# Patient Record
Sex: FEMALE | Race: WHITE | NOT HISPANIC OR LATINO | Employment: OTHER | URBAN - NONMETROPOLITAN AREA
[De-identification: names, ages, dates, MRNs, and addresses within clinical notes are randomized per-mention and may not be internally consistent; named-entity substitution may affect disease eponyms.]

---

## 2017-03-22 RX ORDER — DULOXETIN HYDROCHLORIDE 60 MG/1
60 CAPSULE, DELAYED RELEASE ORAL DAILY
Qty: 30 CAPSULE | Refills: 11 | Status: SHIPPED | OUTPATIENT
Start: 2017-03-22 | End: 2018-03-05

## 2017-03-23 ENCOUNTER — TELEPHONE (OUTPATIENT)
Dept: FAMILY MEDICINE CLINIC | Facility: CLINIC | Age: 37
End: 2017-03-23

## 2017-03-23 NOTE — TELEPHONE ENCOUNTER
Patient called and advised the script was sent yesterday 3/22/17,  She states she did not check with the pharm.  I told her they don't notify you of refills like they have in the past.          ----- Message from Apple Lawrence sent at 3/23/2017  3:09 PM CDT -----  Regarding: refill   Aileen Gena pt  Contact: [phone]  Pt left message on machine today for Aileen that she needed a refill of Duloxetine sent to [facility]. She is almost out she said that she left message yesterday but didn't hear anything back. Phone is [phone]. 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

## 2017-04-02 ENCOUNTER — APPOINTMENT (OUTPATIENT)
Dept: GENERAL RADIOLOGY | Facility: HOSPITAL | Age: 37
End: 2017-04-02

## 2017-04-02 ENCOUNTER — HOSPITAL ENCOUNTER (EMERGENCY)
Facility: HOSPITAL | Age: 37
Discharge: HOME | End: 2017-04-03
Attending: FAMILY MEDICINE | Admitting: FAMILY MEDICINE

## 2017-04-02 DIAGNOSIS — S82.852A: Primary | ICD-10-CM

## 2017-04-02 PROCEDURE — 99284 EMERGENCY DEPT VISIT MOD MDM: CPT

## 2017-04-02 PROCEDURE — 96374 THER/PROPH/DIAG INJ IV PUSH: CPT

## 2017-04-02 PROCEDURE — 25010000002 HYDROMORPHONE PER 4 MG: Performed by: FAMILY MEDICINE

## 2017-04-02 PROCEDURE — 73610 X-RAY EXAM OF ANKLE: CPT

## 2017-04-02 PROCEDURE — 96376 TX/PRO/DX INJ SAME DRUG ADON: CPT

## 2017-04-02 RX ORDER — ONDANSETRON 4 MG/1
4 TABLET, FILM COATED ORAL EVERY 6 HOURS PRN
Qty: 10 TABLET | Refills: 0 | Status: SHIPPED | OUTPATIENT
Start: 2017-04-02 | End: 2018-09-24

## 2017-04-02 RX ORDER — CHOLECALCIFEROL (VITAMIN D3) 125 MCG
10 CAPSULE ORAL NIGHTLY
COMMUNITY
End: 2019-08-24 | Stop reason: HOSPADM

## 2017-04-02 RX ORDER — HYDROCODONE BITARTRATE AND ACETAMINOPHEN 5; 325 MG/1; MG/1
1 TABLET ORAL ONCE
Status: COMPLETED | OUTPATIENT
Start: 2017-04-02 | End: 2017-04-02

## 2017-04-02 RX ORDER — HYDROCODONE BITARTRATE AND ACETAMINOPHEN 5; 325 MG/1; MG/1
4 TABLET ORAL ONCE
Status: DISCONTINUED | OUTPATIENT
Start: 2017-04-02 | End: 2017-04-03 | Stop reason: HOSPADM

## 2017-04-02 RX ORDER — HYDROCODONE BITARTRATE AND ACETAMINOPHEN 10; 325 MG/1; MG/1
1 TABLET ORAL EVERY 6 HOURS PRN
Qty: 20 TABLET | Refills: 0 | Status: SHIPPED | OUTPATIENT
Start: 2017-04-02 | End: 2017-04-05 | Stop reason: SDUPTHER

## 2017-04-02 RX ADMIN — HYDROMORPHONE HYDROCHLORIDE 1 MG: 1 INJECTION, SOLUTION INTRAMUSCULAR; INTRAVENOUS; SUBCUTANEOUS at 23:30

## 2017-04-02 RX ADMIN — HYDROCODONE BITARTRATE AND ACETAMINOPHEN 1 TABLET: 5; 325 TABLET ORAL at 23:02

## 2017-04-02 RX ADMIN — HYDROMORPHONE HYDROCHLORIDE 1 MG: 1 INJECTION, SOLUTION INTRAMUSCULAR; INTRAVENOUS; SUBCUTANEOUS at 22:34

## 2017-04-03 VITALS
BODY MASS INDEX: 32.44 KG/M2 | RESPIRATION RATE: 18 BRPM | HEART RATE: 114 BPM | TEMPERATURE: 98.5 F | WEIGHT: 190 LBS | SYSTOLIC BLOOD PRESSURE: 115 MMHG | OXYGEN SATURATION: 95 % | DIASTOLIC BLOOD PRESSURE: 63 MMHG | HEIGHT: 64 IN

## 2017-04-03 PROCEDURE — 25010000002 HYDROMORPHONE PER 4 MG: Performed by: FAMILY MEDICINE

## 2017-04-03 PROCEDURE — 96376 TX/PRO/DX INJ SAME DRUG ADON: CPT

## 2017-04-03 RX ORDER — HYDROCODONE BITARTRATE AND ACETAMINOPHEN 5; 325 MG/1; MG/1
1 TABLET ORAL EVERY 6 HOURS PRN
Status: DISCONTINUED | OUTPATIENT
Start: 2017-04-03 | End: 2017-04-03 | Stop reason: HOSPADM

## 2017-04-03 RX ADMIN — HYDROMORPHONE HYDROCHLORIDE 1 MG: 1 INJECTION, SOLUTION INTRAMUSCULAR; INTRAVENOUS; SUBCUTANEOUS at 00:18

## 2017-04-03 NOTE — ED NOTES
PT BROUGHT INTO ED FOR EVALUATION OF INJURIES SUSTAINED FROM A FALL. PT FELL IN A BLUFF AND HAS AN OBVIOUS HX TO THE L ANKLE. THERE WAS AN APPROX 2 HOUR RESCUE D/T THE FX. CMS INTACT.  PT GIVEN PAIN MED EN ROUTE VIA EMS.       Rocío Charlton RN  04/02/17 2052

## 2017-04-03 NOTE — ED PROVIDER NOTES
Subjective   Patient is a 36 y.o. female presenting with lower extremity pain.   Lower Extremity Issue   Location:  Ankle  Injury: yes    Ankle location:  L ankle  Pain details:     Quality:  Aching    Severity:  Severe    Onset quality:  Sudden    Timing:  Constant  Foreign body present:  No foreign bodies  Worsened by:  Flexion and extension  Associated symptoms: decreased ROM, swelling and tingling    Associated symptoms: no back pain, no fatigue, no fever, no itching, no muscle weakness, no neck pain, no numbness and no stiffness    Risk factors: no concern for non-accidental trauma        Review of Systems   Constitutional: Negative for appetite change, chills, diaphoresis, fatigue and fever.   HENT: Negative for congestion, ear discharge, ear pain, nosebleeds, rhinorrhea, sinus pressure, sore throat and trouble swallowing.    Eyes: Negative for discharge and redness.   Respiratory: Negative for apnea, cough, chest tightness, shortness of breath and wheezing.    Cardiovascular: Negative for chest pain.   Gastrointestinal: Positive for nausea. Negative for abdominal pain, diarrhea and vomiting.   Endocrine: Negative for polyuria.   Genitourinary: Negative for dysuria, frequency and urgency.   Musculoskeletal: Positive for gait problem and joint swelling. Negative for back pain, myalgias, neck pain and stiffness.   Skin: Negative for color change, itching and rash.   Allergic/Immunologic: Negative for immunocompromised state.   Neurological: Negative for dizziness, seizures, syncope, weakness, light-headedness and headaches.   Hematological: Negative for adenopathy. Does not bruise/bleed easily.   Psychiatric/Behavioral: Negative for behavioral problems and confusion.   All other systems reviewed and are negative.      Past Medical History:   Diagnosis Date   • Abdominal pain    • Acute bronchitis    • Acute upper respiratory infection    • Anxiety state    • Backache    • Bell's palsy    • Cellulitis      left  "breast      • Chest wall pain     right lateral chest wall-poss early shingles      • Chest wall pain 10/21/2015    right lateral chest wall-poss early shingles      • Chronic low back pain     May 13 2014 MRI ddd with mild left neuroforminal narrowing l4-l5      • Chronic retention of urine     ACUTE   • Cough    • Depressive disorder    • Dysuria    • Facial hemiparesis    • Facial hemiparesis    • Fatigue    • Fatigue    • Fever    • Ganglion cyst     LEFT BREAST   • Generalized anxiety disorder    • Headache    • Insomnia    • Pain and other symptoms associated with female genital organs    • Pain radiating to back     LUMBAR   • Postnasal drip    • Retention of urine    • Skin sensation disturbance    • Urinary incontinence    • Urinary tract infectious disease    • Viral gastroenteritis    • Vulvovaginitis        Allergies   Allergen Reactions   • Compazine [Prochlorperazine Edisylate] Anxiety     \"coming out of my skin\"       Past Surgical History:   Procedure Laterality Date   • ANKLE OPEN REDUCTION INTERNAL FIXATION Left 2017    Procedure: ANKLE OPEN REDUCTION INTERNAL FIXATION;  Surgeon: Leo Cisneros MD;  Location: [facility];  Service:    • APPENDECTOMY     •  SECTION     • CHOLECYSTECTOMY     • INJECTION OF MEDICATION  2016    KENALOG(4)   • INJECTION OF MEDICATION  2011    PHENERGAN(1)   • INJECTION OF MEDICATION  2015    ROCEPHIN(1)   • INJECTION OF MEDICATION  2015    TORADOL(2)       Family History   Problem Relation Age of Onset   • Alzheimer's disease Other    • ADD / ADHD Other    • Depression Other    • Diabetes Other    • Migraines Other        Social History     Social History   • Marital status:      Spouse name: N/A   • Number of children: N/A   • Years of education: N/A     Social History Main Topics   • Smoking status: Current Every Day Smoker     Packs/day: 1.00   • Smokeless tobacco: Never Used   • Alcohol use No   • Drug use: No   • " Sexual activity: Defer     Other Topics Concern   • None     Social History Narrative           Objective   Physical Exam   Constitutional: She is oriented to person, place, and time. She appears well-developed and well-nourished.   HENT:   Head: Normocephalic and atraumatic.   Nose: Nose normal.   Mouth/Throat: Oropharynx is clear and moist.   Eyes: Conjunctivae and EOM are normal. Pupils are equal, round, and reactive to light. Right eye exhibits no discharge. Left eye exhibits no discharge. No scleral icterus.   Neck: Normal range of motion. Neck supple. No tracheal deviation present.   Cardiovascular: Normal rate, regular rhythm and normal heart sounds.    No murmur heard.  Pulmonary/Chest: Effort normal and breath sounds normal. No stridor. No respiratory distress. She has no wheezes. She has no rales.   Abdominal: Soft. Bowel sounds are normal. She exhibits no distension and no mass. There is no tenderness. There is no rebound and no guarding.   Musculoskeletal: She exhibits tenderness. She exhibits no edema.        Left ankle: She exhibits decreased range of motion, swelling and deformity. She exhibits no laceration and normal pulse. Tenderness. Lateral malleolus and medial malleolus tenderness found.   Neurological: She is alert and oriented to person, place, and time. Coordination normal.   Skin: Skin is warm and dry. No rash noted. No erythema.   Psychiatric: She has a normal mood and affect. Her behavior is normal. Thought content normal.   Nursing note and vitals reviewed.      Splint Application  Date/Time: 4/11/2017 3:06 PM  Performed by: HAILEY ORTIZ  Authorized by: HAILEY OTRIZ   Consent: Verbal consent obtained.  Location details: left leg  Splint type: sugar tong  Supplies used: Ortho-Glass,  elastic bandage and cotton padding  Post-procedure: The splinted body part was neurovascularly unchanged following the procedure.  Patient tolerance: Patient tolerated the procedure well with  no immediate complications               ED Course  ED Course        Findings discussed with Dr. Vaughn, who has reviewed x-rays, and recommends follow up in clinic this Wednesday.          MDM    Final diagnoses:   Fracture, trimalleolar, left, closed, initial encounter            Дмитрий Dickens MD  04/03/17 0000       Дмитрий Dickens MD  04/11/17 1508       Дмитрий Dickens MD  05/08/17 2596

## 2017-04-05 ENCOUNTER — ANESTHESIA EVENT (OUTPATIENT)
Dept: PERIOP | Facility: HOSPITAL | Age: 37
End: 2017-04-05

## 2017-04-05 ENCOUNTER — OFFICE VISIT (OUTPATIENT)
Dept: ORTHOPEDIC SURGERY | Facility: CLINIC | Age: 37
End: 2017-04-05

## 2017-04-05 VITALS — HEIGHT: 64 IN | BODY MASS INDEX: 32.44 KG/M2 | WEIGHT: 190 LBS

## 2017-04-05 DIAGNOSIS — S82.852S CLOSED TRIMALLEOLAR FRACTURE OF LEFT ANKLE, SEQUELA: Primary | ICD-10-CM

## 2017-04-05 PROBLEM — S82.853A TRIMALLEOLAR FRACTURE OF ANKLE, CLOSED: Status: ACTIVE | Noted: 2017-04-05

## 2017-04-05 PROBLEM — S82.852A CLOSED TRIMALLEOLAR FRACTURE OF LEFT ANKLE: Status: ACTIVE | Noted: 2017-04-05

## 2017-04-05 PROCEDURE — 99204 OFFICE O/P NEW MOD 45 MIN: CPT | Performed by: ORTHOPAEDIC SURGERY

## 2017-04-05 NOTE — PROGRESS NOTES
Chris Valdez is a 36 y.o. female   Primary provider:  ADEOLA Bradley       Chief Complaint   Patient presents with   • Left Ankle - Pain     X-rays done [facility]   HISTORY OF PRESENT ILLNESS:    Pain   This is a new problem. The current episode started in the past 7 days. The problem occurs constantly. The problem has been unchanged. Associated symptoms include joint swelling and neck pain. Pertinent negatives include no abdominal pain, chest pain, chills, congestion, coughing, diaphoresis, fatigue, fever, headaches, nausea, numbness, rash or weakness. Associated symptoms comments: Left ankle pain . Exacerbated by: any movement. She has tried ice, oral narcotics, rest, lying down and acetaminophen for the symptoms. The treatment provided no relief.     Fell when she was hiking at [employer] in [place].  She was evaluated in the ED.       CONCURRENT MEDICAL HISTORY:    Past Medical History:   Diagnosis Date   • Abdominal pain    • Acute bronchitis    • Acute upper respiratory infection    • Anxiety state    • Backache    • Bell's palsy    • Cellulitis      left breast      • Chest wall pain     right lateral chest wall-poss early shingles      • Chest wall pain 10/21/2015    right lateral chest wall-poss early shingles      • Chronic low back pain     May 13 2014 MRI ddd with mild left neuroforminal narrowing l4-l5      • Chronic retention of urine     ACUTE   • Cough    • Depressive disorder    • Dysuria    • Facial hemiparesis    • Facial hemiparesis    • Fatigue    • Fatigue    • Fever    • Ganglion cyst     LEFT BREAST   • Generalized anxiety disorder    • Headache    • Insomnia    • Pain and other symptoms associated with female genital organs    • Pain radiating to back     LUMBAR   • Postnasal drip    • Retention of urine    • Skin sensation disturbance    • Urinary incontinence    • Urinary tract infectious disease    • Viral gastroenteritis    • Vulvovaginitis        Allergies   Allergen  Reactions   • Compazine [Prochlorperazine Edisylate]          Current Outpatient Prescriptions:   •  baclofen (LIORESAL) 10 MG tablet, Take 10 mg by mouth 4 (Four) Times a Day., Disp: , Rfl:   •  baclofen (LIORESAL) 10 MG tablet, Take 1 tablet by mouth 3 (Three) Times a Day., Disp: 90 tablet, Rfl: 3  •  clonazePAM (KlonoPIN) 1 MG tablet, Take 1 tablet by mouth 3 (three) times a day as needed for seizures., Disp: 60 tablet, Rfl: 0  •  DULoxetine (CYMBALTA) 60 MG capsule, Take 1 capsule by mouth Daily., Disp: 30 capsule, Rfl: 11  •  HYDROcodone-acetaminophen (NORCO)  MG per tablet, Take 1 tablet by mouth every 6 (six) hours as needed for moderate pain (4-6)., Disp: , Rfl:   •  HYDROcodone-acetaminophen (NORCO)  MG per tablet, Take 1 tablet by mouth Every 6 (Six) Hours As Needed for Moderate Pain (4-6)., Disp: 20 tablet, Rfl: 0  •  levonorgestrel (MIRENA) 20 MCG/24HR IUD, 1 each by Intrauterine route 1 (one) time., Disp: , Rfl:   •  melatonin 5 MG tablet tablet, Take 10 mg by mouth Every Night., Disp: , Rfl:   •  ondansetron (ZOFRAN) 4 MG tablet, Take 1 tablet by mouth Every 6 (Six) Hours As Needed for Nausea or Vomiting., Disp: 10 tablet, Rfl: 0  •  SUMAtriptan (IMITREX) 100 MG tablet, Take 100 mg by mouth every 2 (two) hours as needed for migraine., Disp: , Rfl:   •  temazepam (RESTORIL) 30 MG capsule, Take 1 capsule by mouth every night at bedtime., Disp: 30 capsule, Rfl: 0  •  traMADol (ULTRAM) 50 MG tablet, Take 50 mg by mouth 2 (two) times a day., Disp: , Rfl:   •  traMADol (ULTRAM) 50 MG tablet, Take 1 tablet by mouth 2 (Two) Times a Day **DO NOT FILL UNTIL 3.30.17**, Disp: 60 tablet, Rfl: 0    Past Surgical History:   Procedure Laterality Date   • INJECTION OF MEDICATION  02/21/2016    KENALOG(4)   • INJECTION OF MEDICATION  01/17/2011    PHENERGAN(1)   • INJECTION OF MEDICATION  09/14/2015    ROCEPHIN(1)   • INJECTION OF MEDICATION  06/05/2015    TORADOL(2)       Family History   Problem Relation  Age of Onset   • Alzheimer's disease Other    • ADD / ADHD Other    • Depression Other    • Diabetes Other    • Migraines Other        Social History     Social History   • Marital status:      Spouse name: N/A   • Number of children: N/A   • Years of education: N/A     Occupational History   • Not on file.     Social History Main Topics   • Smoking status: Current Every Day Smoker   • Smokeless tobacco: Not on file   • Alcohol use Not on file   • Drug use: Not on file   • Sexual activity: Not on file     Other Topics Concern   • Not on file     Social History Narrative        Review of Systems   Constitutional: Negative for appetite change, chills, diaphoresis, fatigue, fever and unexpected weight change.   HENT: Negative.  Negative for congestion, dental problem, facial swelling, hearing loss, nosebleeds, postnasal drip, trouble swallowing and voice change.    Eyes: Negative.  Negative for pain, discharge, redness and itching.   Respiratory: Negative.  Negative for cough, choking, chest tightness, shortness of breath, wheezing and stridor.    Cardiovascular: Negative.  Negative for chest pain, palpitations and leg swelling.   Gastrointestinal: Negative.  Negative for abdominal pain, blood in stool, constipation, diarrhea and nausea.   Endocrine: Negative.  Negative for cold intolerance and heat intolerance.   Genitourinary: Negative.  Negative for dysuria, frequency, hematuria and urgency.   Musculoskeletal: Positive for back pain, joint swelling and neck pain. Negative for gait problem and neck stiffness.   Skin: Negative.  Negative for pallor and rash.   Allergic/Immunologic: Negative.    Neurological: Negative for syncope, facial asymmetry, speech difficulty, weakness, numbness and headaches.   Hematological: Negative.    Psychiatric/Behavioral: Positive for sleep disturbance. Negative for agitation, behavioral problems, confusion, decreased concentration, dysphoric mood and hallucinations. The patient  "is nervous/anxious. The patient is not hyperactive.        PHYSICAL EXAMINATION:       Ht 64\" (162.6 cm)  Wt 190 lb (86.2 kg)  BMI 32.61 kg/m2    Physical Exam   Constitutional: She is oriented to person, place, and time. She appears well-developed and well-nourished. No distress.   HENT:   Head: Normocephalic.   Right Ear: External ear normal.   Left Ear: External ear normal.   Mouth/Throat: No oropharyngeal exudate.   Eyes: EOM are normal. Pupils are equal, round, and reactive to light. Right eye exhibits no discharge. Left eye exhibits no discharge. No scleral icterus.   Neck: Normal range of motion. No JVD present. No tracheal deviation present. No thyromegaly present.   Cardiovascular: Normal rate, regular rhythm, normal heart sounds and intact distal pulses.  Exam reveals no gallop and no friction rub.    No murmur heard.  Pulmonary/Chest: Effort normal and breath sounds normal. No respiratory distress. She has no wheezes. She has no rales. She exhibits no tenderness.   Abdominal: Soft. Bowel sounds are normal. She exhibits no distension and no mass. There is no tenderness. There is no guarding.   Musculoskeletal: Normal range of motion. She exhibits no edema or deformity.        Thoracic back: She exhibits normal range of motion, no tenderness, no bony tenderness, no swelling, no edema, no deformity, no laceration, no pain, no spasm and normal pulse.        Lumbar back: Normal. She exhibits normal range of motion, no tenderness, no bony tenderness, no swelling, no edema, no deformity, no laceration, no pain, no spasm and normal pulse.   Lymphadenopathy:     She has no cervical adenopathy.   Neurological: She is alert and oriented to person, place, and time. She has normal reflexes. She displays normal reflexes. No cranial nerve deficit. She exhibits normal muscle tone. Coordination normal.   Skin: Skin is warm and dry. No rash noted. She is not diaphoretic. No erythema.   Psychiatric: She has a normal mood " and affect. Her behavior is normal. Thought content normal.       GAIT:     []  Normal  []  Antalgic    Assistive device: []  None  []  Walker     []  Crutches  []  Cane     [x]  Wheelchair  []  Stretcher    Right Ankle Exam   Swelling: moderate    Tenderness   The patient is experiencing tenderness in the proximal fibula and medial malleolus.        Range of Motion   Dorsiflexion: 0   Plantar flexion: 0     Muscle Strength   Dorsiflexion:  2/5  Plantar flexion:  2/5  Anterior tibial:  2/5  Posterior tibial:  2/5  Gastrocsoleus:  2/5  Other   Erythema: absent  Sensation: normal     Comments:  Effusion  Splint in place.          Brisk capillary refill.        Xr Ankle 3+ View Left    Result Date: 4/2/2017  Narrative: [facility] [place] Patient Name: BEVERLY PHELAN ATTENDING: REFERRING: EMERGENCY, TRIAGE ORDERING: EMERGENCY, TRIAGE ----------------------- PROCEDURE: Left ankle DATE OF EXAM: 4/5/2017 HISTORY: Fall with injury and deformity. Three views of the left ankle were obtained. Fracture dislocation of the ankle is seen. Transverse fracture through the medial malleolus is seen with oblique fractures of the distal shaft of the fibula. There is also possible fracture of the posterior malleolus. There is a lateral displacement of the talus at the ankle mortise along with angulation.     Impression: Trimalleolar fracture dislocation of ankle. Electronically signed by:  Quoc Cristina MD  4/2/2017 9:37 PM CDT Workstation: [id number]          ASSESSMENT:    Diagnoses and all orders for this visit:    Closed trimalleolar fracture of left ankle, sequela          PLAN    Recommend ORIF for surgical stabilization.  Risks discussed and will proceed.  She understands the expected course for post operative treatment.  Prescription for percocet today.      Leo Cisneros MD

## 2017-04-05 NOTE — H&P
Subjective:     Patient ID: Chris Valdez is a 36 y.o. female.    Chief Complaint:    History of Present Illness   Left ankle pain          Social History     Occupational History   • Not on file.     Social History Main Topics   • Smoking status: Current Every Day Smoker   • Smokeless tobacco: Not on file   • Alcohol use Not on file   • Drug use: Not on file   • Sexual activity: Not on file      Review of Systems        Objective:     Ortho Exam    See exam    Assessment:       1. Closed trimalleolar fracture of left ankle, sequela          Plan:        ORIF of ankle fracture.

## 2017-04-06 ENCOUNTER — APPOINTMENT (OUTPATIENT)
Dept: GENERAL RADIOLOGY | Facility: HOSPITAL | Age: 37
End: 2017-04-06

## 2017-04-06 ENCOUNTER — ANESTHESIA (OUTPATIENT)
Dept: PERIOP | Facility: HOSPITAL | Age: 37
End: 2017-04-06

## 2017-04-06 ENCOUNTER — HOSPITAL ENCOUNTER (OUTPATIENT)
Facility: HOSPITAL | Age: 37
Setting detail: HOSPITAL OUTPATIENT SURGERY
Discharge: HOME | End: 2017-04-06
Attending: ORTHOPAEDIC SURGERY | Admitting: ORTHOPAEDIC SURGERY

## 2017-04-06 VITALS
HEIGHT: 64 IN | SYSTOLIC BLOOD PRESSURE: 123 MMHG | RESPIRATION RATE: 18 BRPM | BODY MASS INDEX: 33.99 KG/M2 | TEMPERATURE: 98.9 F | OXYGEN SATURATION: 96 % | WEIGHT: 199.08 LBS | DIASTOLIC BLOOD PRESSURE: 81 MMHG | HEART RATE: 113 BPM

## 2017-04-06 DIAGNOSIS — S82.852S CLOSED TRIMALLEOLAR FRACTURE OF LEFT ANKLE, SEQUELA: ICD-10-CM

## 2017-04-06 LAB
ANION GAP SERPL CALCULATED.3IONS-SCNC: 12 MMOL/L (ref 5–15)
B-HCG UR QL: NEGATIVE
BUN BLD-MCNC: 12 MG/DL (ref 7–21)
BUN/CREAT SERPL: 20.3 (ref 7–25)
CALCIUM SPEC-SCNC: 9.1 MG/DL (ref 8.4–10.2)
CHLORIDE SERPL-SCNC: 100 MMOL/L (ref 95–110)
CO2 SERPL-SCNC: 22 MMOL/L (ref 22–31)
CREAT BLD-MCNC: 0.59 MG/DL (ref 0.5–1)
DEPRECATED RDW RBC AUTO: 42.1 FL (ref 36.4–46.3)
ERYTHROCYTE [DISTWIDTH] IN BLOOD BY AUTOMATED COUNT: 12.5 % (ref 11.5–14.5)
GFR SERPL CREATININE-BSD FRML MDRD: 117 ML/MIN/1.73 (ref 64–149)
GLUCOSE BLD-MCNC: 88 MG/DL (ref 60–100)
HCT VFR BLD AUTO: 41.5 % (ref 35–45)
HGB BLD-MCNC: 14.1 G/DL (ref 12–15.5)
MCH RBC QN AUTO: 31.1 PG (ref 26.5–34)
MCHC RBC AUTO-ENTMCNC: 34 G/DL (ref 31.4–36)
MCV RBC AUTO: 91.6 FL (ref 80–98)
MRSA DNA SPEC QL NAA+PROBE: NEGATIVE
PLATELET # BLD AUTO: 254 10*3/MM3 (ref 150–450)
PMV BLD AUTO: 10.7 FL (ref 8–12)
POTASSIUM BLD-SCNC: 4.2 MMOL/L (ref 3.5–5.1)
RBC # BLD AUTO: 4.53 10*6/MM3 (ref 3.77–5.16)
SODIUM BLD-SCNC: 134 MMOL/L (ref 137–145)
WBC NRBC COR # BLD: 9.33 10*3/MM3 (ref 3.2–9.8)

## 2017-04-06 PROCEDURE — 25010000002 HYDROMORPHONE PER 4 MG: Performed by: NURSE ANESTHETIST, CERTIFIED REGISTERED

## 2017-04-06 PROCEDURE — C1713 ANCHOR/SCREW BN/BN,TIS/BN: HCPCS | Performed by: ORTHOPAEDIC SURGERY

## 2017-04-06 PROCEDURE — 25010000002 MIDAZOLAM PER 1 MG: Performed by: NURSE ANESTHETIST, CERTIFIED REGISTERED

## 2017-04-06 PROCEDURE — 87641 MR-STAPH DNA AMP PROBE: CPT | Performed by: ORTHOPAEDIC SURGERY

## 2017-04-06 PROCEDURE — 25010000002 ONDANSETRON PER 1 MG: Performed by: NURSE ANESTHETIST, CERTIFIED REGISTERED

## 2017-04-06 PROCEDURE — 27823 TREATMENT OF ANKLE FRACTURE: CPT | Performed by: ORTHOPAEDIC SURGERY

## 2017-04-06 PROCEDURE — 81025 URINE PREGNANCY TEST: CPT | Performed by: ORTHOPAEDIC SURGERY

## 2017-04-06 PROCEDURE — 25010000002 FENTANYL CITRATE (PF) 100 MCG/2ML SOLUTION: Performed by: NURSE ANESTHETIST, CERTIFIED REGISTERED

## 2017-04-06 PROCEDURE — 80048 BASIC METABOLIC PNL TOTAL CA: CPT | Performed by: ORTHOPAEDIC SURGERY

## 2017-04-06 PROCEDURE — 25010000002 MEPERIDINE 25 MG/0.5ML SOLUTION: Performed by: NURSE ANESTHETIST, CERTIFIED REGISTERED

## 2017-04-06 PROCEDURE — 25010000003 CEFAZOLIN PER 500 MG: Performed by: ORTHOPAEDIC SURGERY

## 2017-04-06 PROCEDURE — 25010000002 PROPOFOL 10 MG/ML EMULSION: Performed by: NURSE ANESTHETIST, CERTIFIED REGISTERED

## 2017-04-06 PROCEDURE — 85027 COMPLETE CBC AUTOMATED: CPT | Performed by: ORTHOPAEDIC SURGERY

## 2017-04-06 PROCEDURE — 73610 X-RAY EXAM OF ANKLE: CPT | Performed by: ORTHOPAEDIC SURGERY

## 2017-04-06 PROCEDURE — 76000 FLUOROSCOPY <1 HR PHYS/QHP: CPT

## 2017-04-06 DEVICE — SCRW CORT S/TAP 3.5X22MM: Type: IMPLANTABLE DEVICE | Site: ANKLE | Status: FUNCTIONAL

## 2017-04-06 DEVICE — SCRW CANN SHRT THRD 1/3 4X40MM: Type: IMPLANTABLE DEVICE | Site: ANKLE | Status: FUNCTIONAL

## 2017-04-06 DEVICE — SCRW CORT S/TAP 3.5X14MM: Type: IMPLANTABLE DEVICE | Site: ANKLE | Status: FUNCTIONAL

## 2017-04-06 DEVICE — SCRW CANC FUL/THRD 4.0X14MM: Type: IMPLANTABLE DEVICE | Site: ANKLE | Status: FUNCTIONAL

## 2017-04-06 DEVICE — IMPLANTABLE DEVICE: Type: IMPLANTABLE DEVICE | Site: ANKLE | Status: FUNCTIONAL

## 2017-04-06 DEVICE — PLT TBG 1/3 LCP W COL 7HL 81MM: Type: IMPLANTABLE DEVICE | Site: ANKLE | Status: FUNCTIONAL

## 2017-04-06 DEVICE — SCRW CANC FUL/THRD 4.0X16MM: Type: IMPLANTABLE DEVICE | Site: ANKLE | Status: FUNCTIONAL

## 2017-04-06 DEVICE — IMPLANTABLE DEVICE
Type: IMPLANTABLE DEVICE | Site: ANKLE | Status: FUNCTIONAL
Brand: MICROAIRE®

## 2017-04-06 DEVICE — SCRW CORT S/TAP 3.5X12MM: Type: IMPLANTABLE DEVICE | Site: ANKLE | Status: FUNCTIONAL

## 2017-04-06 DEVICE — WASHR SCRW SM 7.0MM: Type: IMPLANTABLE DEVICE | Site: ANKLE | Status: FUNCTIONAL

## 2017-04-06 RX ORDER — HYDROMORPHONE HCL 110MG/55ML
PATIENT CONTROLLED ANALGESIA SYRINGE INTRAVENOUS AS NEEDED
Status: DISCONTINUED | OUTPATIENT
Start: 2017-04-06 | End: 2017-04-06 | Stop reason: SURG

## 2017-04-06 RX ORDER — OXYCODONE AND ACETAMINOPHEN 10; 325 MG/1; MG/1
1 TABLET ORAL EVERY 4 HOURS PRN
Status: DISCONTINUED | OUTPATIENT
Start: 2017-04-06 | End: 2017-04-06 | Stop reason: HOSPADM

## 2017-04-06 RX ORDER — ONDANSETRON 2 MG/ML
4 INJECTION INTRAMUSCULAR; INTRAVENOUS ONCE AS NEEDED
Status: COMPLETED | OUTPATIENT
Start: 2017-04-06 | End: 2017-04-06

## 2017-04-06 RX ORDER — LIDOCAINE HYDROCHLORIDE 20 MG/ML
INJECTION, SOLUTION INFILTRATION; PERINEURAL AS NEEDED
Status: DISCONTINUED | OUTPATIENT
Start: 2017-04-06 | End: 2017-04-06 | Stop reason: SURG

## 2017-04-06 RX ORDER — ACETAMINOPHEN 325 MG/1
650 TABLET ORAL ONCE AS NEEDED
Status: DISCONTINUED | OUTPATIENT
Start: 2017-04-06 | End: 2017-04-06 | Stop reason: HOSPADM

## 2017-04-06 RX ORDER — BACTERIOSTATIC SODIUM CHLORIDE 0.9 %
VIAL (ML) INJECTION AS NEEDED
Status: DISCONTINUED | OUTPATIENT
Start: 2017-04-06 | End: 2017-04-06 | Stop reason: HOSPADM

## 2017-04-06 RX ORDER — BACITRACIN 50000 [IU]/1
INJECTION, POWDER, FOR SOLUTION INTRAMUSCULAR AS NEEDED
Status: DISCONTINUED | OUTPATIENT
Start: 2017-04-06 | End: 2017-04-06 | Stop reason: HOSPADM

## 2017-04-06 RX ORDER — PROPOFOL 10 MG/ML
VIAL (ML) INTRAVENOUS AS NEEDED
Status: DISCONTINUED | OUTPATIENT
Start: 2017-04-06 | End: 2017-04-06 | Stop reason: SURG

## 2017-04-06 RX ORDER — NALOXONE HCL 0.4 MG/ML
0.2 VIAL (ML) INJECTION AS NEEDED
Status: DISCONTINUED | OUTPATIENT
Start: 2017-04-06 | End: 2017-04-06 | Stop reason: HOSPADM

## 2017-04-06 RX ORDER — SODIUM CHLORIDE 0.9 % (FLUSH) 0.9 %
1-10 SYRINGE (ML) INJECTION AS NEEDED
Status: DISCONTINUED | OUTPATIENT
Start: 2017-04-06 | End: 2017-04-06 | Stop reason: HOSPADM

## 2017-04-06 RX ORDER — PROMETHAZINE HYDROCHLORIDE 25 MG/ML
12.5 INJECTION, SOLUTION INTRAMUSCULAR; INTRAVENOUS EVERY 6 HOURS PRN
Status: DISCONTINUED | OUTPATIENT
Start: 2017-04-06 | End: 2017-04-06 | Stop reason: HOSPADM

## 2017-04-06 RX ORDER — SODIUM CHLORIDE, SODIUM LACTATE, POTASSIUM CHLORIDE, CALCIUM CHLORIDE 600; 310; 30; 20 MG/100ML; MG/100ML; MG/100ML; MG/100ML
9 INJECTION, SOLUTION INTRAVENOUS CONTINUOUS
Status: DISCONTINUED | OUTPATIENT
Start: 2017-04-06 | End: 2017-04-06 | Stop reason: HOSPADM

## 2017-04-06 RX ORDER — PROMETHAZINE HYDROCHLORIDE 12.5 MG/1
12.5 TABLET ORAL EVERY 6 HOURS PRN
Qty: 20 TABLET | Refills: 0 | Status: SHIPPED | OUTPATIENT
Start: 2017-04-06 | End: 2018-09-24

## 2017-04-06 RX ORDER — FLUMAZENIL 0.1 MG/ML
0.2 INJECTION INTRAVENOUS AS NEEDED
Status: DISCONTINUED | OUTPATIENT
Start: 2017-04-06 | End: 2017-04-06 | Stop reason: HOSPADM

## 2017-04-06 RX ORDER — DIPHENHYDRAMINE HYDROCHLORIDE 50 MG/ML
12.5 INJECTION INTRAMUSCULAR; INTRAVENOUS
Status: DISCONTINUED | OUTPATIENT
Start: 2017-04-06 | End: 2017-04-06 | Stop reason: HOSPADM

## 2017-04-06 RX ORDER — ACETAMINOPHEN 650 MG/1
650 SUPPOSITORY RECTAL ONCE AS NEEDED
Status: DISCONTINUED | OUTPATIENT
Start: 2017-04-06 | End: 2017-04-06 | Stop reason: HOSPADM

## 2017-04-06 RX ORDER — FENTANYL CITRATE 50 UG/ML
INJECTION, SOLUTION INTRAMUSCULAR; INTRAVENOUS AS NEEDED
Status: DISCONTINUED | OUTPATIENT
Start: 2017-04-06 | End: 2017-04-06 | Stop reason: SURG

## 2017-04-06 RX ORDER — ONDANSETRON 2 MG/ML
INJECTION INTRAMUSCULAR; INTRAVENOUS AS NEEDED
Status: DISCONTINUED | OUTPATIENT
Start: 2017-04-06 | End: 2017-04-06 | Stop reason: SURG

## 2017-04-06 RX ORDER — OXYCODONE AND ACETAMINOPHEN 7.5; 325 MG/1; MG/1
1 TABLET ORAL EVERY 6 HOURS PRN
Status: DISCONTINUED | OUTPATIENT
Start: 2017-04-06 | End: 2017-04-06 | Stop reason: HOSPADM

## 2017-04-06 RX ORDER — MORPHINE SULFATE 4 MG/ML
4 INJECTION, SOLUTION INTRAMUSCULAR; INTRAVENOUS EVERY 4 HOURS PRN
Status: DISCONTINUED | OUTPATIENT
Start: 2017-04-06 | End: 2017-04-06 | Stop reason: HOSPADM

## 2017-04-06 RX ORDER — LABETALOL HYDROCHLORIDE 5 MG/ML
5 INJECTION, SOLUTION INTRAVENOUS
Status: DISCONTINUED | OUTPATIENT
Start: 2017-04-06 | End: 2017-04-06 | Stop reason: HOSPADM

## 2017-04-06 RX ORDER — SODIUM CHLORIDE, SODIUM GLUCONATE, SODIUM ACETATE, POTASSIUM CHLORIDE, AND MAGNESIUM CHLORIDE 526; 502; 368; 37; 30 MG/100ML; MG/100ML; MG/100ML; MG/100ML; MG/100ML
1000 INJECTION, SOLUTION INTRAVENOUS CONTINUOUS PRN
Status: DISCONTINUED | OUTPATIENT
Start: 2017-04-06 | End: 2017-04-06 | Stop reason: HOSPADM

## 2017-04-06 RX ORDER — MIDAZOLAM HYDROCHLORIDE 1 MG/ML
INJECTION INTRAMUSCULAR; INTRAVENOUS AS NEEDED
Status: DISCONTINUED | OUTPATIENT
Start: 2017-04-06 | End: 2017-04-06 | Stop reason: SURG

## 2017-04-06 RX ADMIN — LIDOCAINE HYDROCHLORIDE 50 MG: 20 INJECTION, SOLUTION INFILTRATION; PERINEURAL at 15:45

## 2017-04-06 RX ADMIN — HYDROMORPHONE HYDROCHLORIDE 0.5 MG: 1 INJECTION, SOLUTION INTRAMUSCULAR; INTRAVENOUS; SUBCUTANEOUS at 18:24

## 2017-04-06 RX ADMIN — MEPERIDINE HYDROCHLORIDE 12.5 MG: 50 INJECTION, SOLUTION INTRAMUSCULAR; INTRAVENOUS; SUBCUTANEOUS at 18:38

## 2017-04-06 RX ADMIN — FENTANYL CITRATE 25 MCG: 50 INJECTION, SOLUTION INTRAMUSCULAR; INTRAVENOUS at 15:52

## 2017-04-06 RX ADMIN — FENTANYL CITRATE 50 MCG: 50 INJECTION, SOLUTION INTRAMUSCULAR; INTRAVENOUS at 16:35

## 2017-04-06 RX ADMIN — HYDROMORPHONE HYDROCHLORIDE 0.5 MG: 1 INJECTION, SOLUTION INTRAMUSCULAR; INTRAVENOUS; SUBCUTANEOUS at 18:29

## 2017-04-06 RX ADMIN — OXYCODONE HYDROCHLORIDE AND ACETAMINOPHEN 1 TABLET: 10; 325 TABLET ORAL at 19:39

## 2017-04-06 RX ADMIN — MEPERIDINE HYDROCHLORIDE 12.5 MG: 50 INJECTION, SOLUTION INTRAMUSCULAR; INTRAVENOUS; SUBCUTANEOUS at 18:17

## 2017-04-06 RX ADMIN — PROPOFOL 200 MG: 10 INJECTION, EMULSION INTRAVENOUS at 15:45

## 2017-04-06 RX ADMIN — HYDROMORPHONE HYDROCHLORIDE 0.5 MG: 2 INJECTION, SOLUTION INTRAMUSCULAR; INTRAVENOUS; SUBCUTANEOUS at 17:10

## 2017-04-06 RX ADMIN — FENTANYL CITRATE 25 MCG: 50 INJECTION, SOLUTION INTRAMUSCULAR; INTRAVENOUS at 15:40

## 2017-04-06 RX ADMIN — SODIUM CHLORIDE, SODIUM GLUCONATE, SODIUM ACETATE, POTASSIUM CHLORIDE, AND MAGNESIUM CHLORIDE 1000 ML: 526; 502; 368; 37; 30 INJECTION, SOLUTION INTRAVENOUS at 14:50

## 2017-04-06 RX ADMIN — ONDANSETRON 4 MG: 2 INJECTION INTRAMUSCULAR; INTRAVENOUS at 17:15

## 2017-04-06 RX ADMIN — HYDROMORPHONE HYDROCHLORIDE 0.5 MG: 2 INJECTION, SOLUTION INTRAMUSCULAR; INTRAVENOUS; SUBCUTANEOUS at 17:20

## 2017-04-06 RX ADMIN — HYDROMORPHONE HYDROCHLORIDE 0.5 MG: 1 INJECTION, SOLUTION INTRAMUSCULAR; INTRAVENOUS; SUBCUTANEOUS at 19:14

## 2017-04-06 RX ADMIN — FENTANYL CITRATE 50 MCG: 50 INJECTION, SOLUTION INTRAMUSCULAR; INTRAVENOUS at 16:05

## 2017-04-06 RX ADMIN — FENTANYL CITRATE 50 MCG: 50 INJECTION, SOLUTION INTRAMUSCULAR; INTRAVENOUS at 16:15

## 2017-04-06 RX ADMIN — ONDANSETRON 4 MG: 2 INJECTION INTRAMUSCULAR; INTRAVENOUS at 18:15

## 2017-04-06 RX ADMIN — MEPERIDINE HYDROCHLORIDE 12.5 MG: 50 INJECTION, SOLUTION INTRAMUSCULAR; INTRAVENOUS; SUBCUTANEOUS at 18:22

## 2017-04-06 RX ADMIN — SODIUM CHLORIDE, SODIUM GLUCONATE, SODIUM ACETATE, POTASSIUM CHLORIDE, AND MAGNESIUM CHLORIDE: 526; 502; 368; 37; 30 INJECTION, SOLUTION INTRAVENOUS at 17:36

## 2017-04-06 RX ADMIN — FENTANYL CITRATE 50 MCG: 50 INJECTION, SOLUTION INTRAMUSCULAR; INTRAVENOUS at 15:55

## 2017-04-06 RX ADMIN — CEFAZOLIN SODIUM 2 G: 1 INJECTION, POWDER, FOR SOLUTION INTRAMUSCULAR; INTRAVENOUS at 15:44

## 2017-04-06 RX ADMIN — FENTANYL CITRATE 25 MCG: 50 INJECTION, SOLUTION INTRAMUSCULAR; INTRAVENOUS at 15:45

## 2017-04-06 RX ADMIN — FENTANYL CITRATE 25 MCG: 50 INJECTION, SOLUTION INTRAMUSCULAR; INTRAVENOUS at 16:00

## 2017-04-06 RX ADMIN — MEPERIDINE HYDROCHLORIDE 12.5 MG: 50 INJECTION, SOLUTION INTRAMUSCULAR; INTRAVENOUS; SUBCUTANEOUS at 18:55

## 2017-04-06 RX ADMIN — MIDAZOLAM 2 MG: 1 INJECTION INTRAMUSCULAR; INTRAVENOUS at 15:37

## 2017-04-06 NOTE — BRIEF OP NOTE
ANKLE OPEN REDUCTION INTERNAL FIXATION  Procedure Note    Chris Valdez  4/6/2017    Pre-op Diagnosis:   Closed trimalleolar fracture of left ankle, sequela [S82.852S]    Post-op Diagnosis:     Post-Op Diagnosis Codes:     * Closed trimalleolar fracture of left ankle, sequela [S82.852S]    Procedure/CPT® Codes:  IA OPEN TX TRIMALLEOLAR ANKLE FX W FIX PST LIP [04134]  IA APPLY LOWER LEG SPLINT [65414]  CHG X-RAY ANKLE 3+ VW [75135]    Procedure(s):  ANKLE OPEN REDUCTION INTERNAL FIXATION    Surgeon(s):  Leo Cisneros MD    Anesthesia: Choice    Staff:   Circulator: Judy Escamilla RN; Francisco Smart RN; Linwood Coleman RN  Scrub Person: Doris Cristina V  Assistant: Blaire Aguirre CSA    Estimated Blood Loss: * 40  Urine Voided: * No values recorded between 4/6/2017  3:40 PM and 4/6/2017  5:58 PM *    Specimens:                * None      Drains:           Findings: unstable ankle fracture    Complications:  none      Leo Cisneros MD     Date: 4/6/2017  Time: 6:18 PM

## 2017-04-06 NOTE — H&P (VIEW-ONLY)
Chris Valdez is a 36 y.o. female   Primary provider:  ADEOLA Bradley       Chief Complaint   Patient presents with   • Left Ankle - Pain     X-rays done [facility]   HISTORY OF PRESENT ILLNESS:    Pain   This is a new problem. The current episode started in the past 7 days. The problem occurs constantly. The problem has been unchanged. Associated symptoms include joint swelling and neck pain. Pertinent negatives include no abdominal pain, chest pain, chills, congestion, coughing, diaphoresis, fatigue, fever, headaches, nausea, numbness, rash or weakness. Associated symptoms comments: Left ankle pain . Exacerbated by: any movement. She has tried ice, oral narcotics, rest, lying down and acetaminophen for the symptoms. The treatment provided no relief.     Fell when she was hiking at [place] in [place].  She was evaluated in the ED.       CONCURRENT MEDICAL HISTORY:    Past Medical History:   Diagnosis Date   • Abdominal pain    • Acute bronchitis    • Acute upper respiratory infection    • Anxiety state    • Backache    • Bell's palsy    • Cellulitis      left breast      • Chest wall pain     right lateral chest wall-poss early shingles      • Chest wall pain 10/21/2015    right lateral chest wall-poss early shingles      • Chronic low back pain     May 13 2014 MRI ddd with mild left neuroforminal narrowing l4-l5      • Chronic retention of urine     ACUTE   • Cough    • Depressive disorder    • Dysuria    • Facial hemiparesis    • Facial hemiparesis    • Fatigue    • Fatigue    • Fever    • Ganglion cyst     LEFT BREAST   • Generalized anxiety disorder    • Headache    • Insomnia    • Pain and other symptoms associated with female genital organs    • Pain radiating to back     LUMBAR   • Postnasal drip    • Retention of urine    • Skin sensation disturbance    • Urinary incontinence    • Urinary tract infectious disease    • Viral gastroenteritis    • Vulvovaginitis        Allergies   Allergen  Reactions   • Compazine [Prochlorperazine Edisylate]          Current Outpatient Prescriptions:   •  baclofen (LIORESAL) 10 MG tablet, Take 10 mg by mouth 4 (Four) Times a Day., Disp: , Rfl:   •  baclofen (LIORESAL) 10 MG tablet, Take 1 tablet by mouth 3 (Three) Times a Day., Disp: 90 tablet, Rfl: 3  •  clonazePAM (KlonoPIN) 1 MG tablet, Take 1 tablet by mouth 3 (three) times a day as needed for seizures., Disp: 60 tablet, Rfl: 0  •  DULoxetine (CYMBALTA) 60 MG capsule, Take 1 capsule by mouth Daily., Disp: 30 capsule, Rfl: 11  •  HYDROcodone-acetaminophen (NORCO)  MG per tablet, Take 1 tablet by mouth every 6 (six) hours as needed for moderate pain (4-6)., Disp: , Rfl:   •  HYDROcodone-acetaminophen (NORCO)  MG per tablet, Take 1 tablet by mouth Every 6 (Six) Hours As Needed for Moderate Pain (4-6)., Disp: 20 tablet, Rfl: 0  •  levonorgestrel (MIRENA) 20 MCG/24HR IUD, 1 each by Intrauterine route 1 (one) time., Disp: , Rfl:   •  melatonin 5 MG tablet tablet, Take 10 mg by mouth Every Night., Disp: , Rfl:   •  ondansetron (ZOFRAN) 4 MG tablet, Take 1 tablet by mouth Every 6 (Six) Hours As Needed for Nausea or Vomiting., Disp: 10 tablet, Rfl: 0  •  SUMAtriptan (IMITREX) 100 MG tablet, Take 100 mg by mouth every 2 (two) hours as needed for migraine., Disp: , Rfl:   •  temazepam (RESTORIL) 30 MG capsule, Take 1 capsule by mouth every night at bedtime., Disp: 30 capsule, Rfl: 0  •  traMADol (ULTRAM) 50 MG tablet, Take 50 mg by mouth 2 (two) times a day., Disp: , Rfl:   •  traMADol (ULTRAM) 50 MG tablet, Take 1 tablet by mouth 2 (Two) Times a Day **DO NOT FILL UNTIL 3.30.17**, Disp: 60 tablet, Rfl: 0    Past Surgical History:   Procedure Laterality Date   • INJECTION OF MEDICATION  02/21/2016    KENALOG(4)   • INJECTION OF MEDICATION  01/17/2011    PHENERGAN(1)   • INJECTION OF MEDICATION  09/14/2015    ROCEPHIN(1)   • INJECTION OF MEDICATION  06/05/2015    TORADOL(2)       Family History   Problem Relation  Age of Onset   • Alzheimer's disease Other    • ADD / ADHD Other    • Depression Other    • Diabetes Other    • Migraines Other        Social History     Social History   • Marital status:      Spouse name: N/A   • Number of children: N/A   • Years of education: N/A     Occupational History   • Not on file.     Social History Main Topics   • Smoking status: Current Every Day Smoker   • Smokeless tobacco: Not on file   • Alcohol use Not on file   • Drug use: Not on file   • Sexual activity: Not on file     Other Topics Concern   • Not on file     Social History Narrative        Review of Systems   Constitutional: Negative for appetite change, chills, diaphoresis, fatigue, fever and unexpected weight change.   HENT: Negative.  Negative for congestion, dental problem, facial swelling, hearing loss, nosebleeds, postnasal drip, trouble swallowing and voice change.    Eyes: Negative.  Negative for pain, discharge, redness and itching.   Respiratory: Negative.  Negative for cough, choking, chest tightness, shortness of breath, wheezing and stridor.    Cardiovascular: Negative.  Negative for chest pain, palpitations and leg swelling.   Gastrointestinal: Negative.  Negative for abdominal pain, blood in stool, constipation, diarrhea and nausea.   Endocrine: Negative.  Negative for cold intolerance and heat intolerance.   Genitourinary: Negative.  Negative for dysuria, frequency, hematuria and urgency.   Musculoskeletal: Positive for back pain, joint swelling and neck pain. Negative for gait problem and neck stiffness.   Skin: Negative.  Negative for pallor and rash.   Allergic/Immunologic: Negative.    Neurological: Negative for syncope, facial asymmetry, speech difficulty, weakness, numbness and headaches.   Hematological: Negative.    Psychiatric/Behavioral: Positive for sleep disturbance. Negative for agitation, behavioral problems, confusion, decreased concentration, dysphoric mood and hallucinations. The patient  "is nervous/anxious. The patient is not hyperactive.        PHYSICAL EXAMINATION:       Ht 64\" (162.6 cm)  Wt 190 lb (86.2 kg)  BMI 32.61 kg/m2    Physical Exam   Constitutional: She is oriented to person, place, and time. She appears well-developed and well-nourished. No distress.   HENT:   Head: Normocephalic.   Right Ear: External ear normal.   Left Ear: External ear normal.   Mouth/Throat: No oropharyngeal exudate.   Eyes: EOM are normal. Pupils are equal, round, and reactive to light. Right eye exhibits no discharge. Left eye exhibits no discharge. No scleral icterus.   Neck: Normal range of motion. No JVD present. No tracheal deviation present. No thyromegaly present.   Cardiovascular: Normal rate, regular rhythm, normal heart sounds and intact distal pulses.  Exam reveals no gallop and no friction rub.    No murmur heard.  Pulmonary/Chest: Effort normal and breath sounds normal. No respiratory distress. She has no wheezes. She has no rales. She exhibits no tenderness.   Abdominal: Soft. Bowel sounds are normal. She exhibits no distension and no mass. There is no tenderness. There is no guarding.   Musculoskeletal: Normal range of motion. She exhibits no edema or deformity.        Thoracic back: She exhibits normal range of motion, no tenderness, no bony tenderness, no swelling, no edema, no deformity, no laceration, no pain, no spasm and normal pulse.        Lumbar back: Normal. She exhibits normal range of motion, no tenderness, no bony tenderness, no swelling, no edema, no deformity, no laceration, no pain, no spasm and normal pulse.   Lymphadenopathy:     She has no cervical adenopathy.   Neurological: She is alert and oriented to person, place, and time. She has normal reflexes. She displays normal reflexes. No cranial nerve deficit. She exhibits normal muscle tone. Coordination normal.   Skin: Skin is warm and dry. No rash noted. She is not diaphoretic. No erythema.   Psychiatric: She has a normal mood " and affect. Her behavior is normal. Thought content normal.       GAIT:     []  Normal  []  Antalgic    Assistive device: []  None  []  Walker     []  Crutches  []  Cane     [x]  Wheelchair  []  Stretcher    Right Ankle Exam   Swelling: moderate    Tenderness   The patient is experiencing tenderness in the proximal fibula and medial malleolus.        Range of Motion   Dorsiflexion: 0   Plantar flexion: 0     Muscle Strength   Dorsiflexion:  2/5  Plantar flexion:  2/5  Anterior tibial:  2/5  Posterior tibial:  2/5  Gastrocsoleus:  2/5  Other   Erythema: absent  Sensation: normal     Comments:  Effusion  Splint in place.          Brisk capillary refill.        Xr Ankle 3+ View Left    Result Date: 4/2/2017  Narrative: [facility] [place] Patient Name: BEVERLY PHELAN ATTENDING: REFERRING: EMERGENCY, TRIAGE ORDERING: EMERGENCY, TRIAGE ----------------------- PROCEDURE: Left ankle DATE OF EXAM: 4/5/2017 HISTORY: Fall with injury and deformity. Three views of the left ankle were obtained. Fracture dislocation of the ankle is seen. Transverse fracture through the medial malleolus is seen with oblique fractures of the distal shaft of the fibula. There is also possible fracture of the posterior malleolus. There is a lateral displacement of the talus at the ankle mortise along with angulation.     Impression: Trimalleolar fracture dislocation of ankle. Electronically signed by:  Quoc Cristina MD  4/2/2017 9:37 PM CDT Workstation: [id number]          ASSESSMENT:    Diagnoses and all orders for this visit:    Closed trimalleolar fracture of left ankle, sequela          PLAN    Recommend ORIF for surgical stabilization.  Risks discussed and will proceed.  She understands the expected course for post operative treatment.  Prescription for percocet today.      Leo Cisneros MD

## 2017-04-06 NOTE — PLAN OF CARE
Problem: Patient Care Overview (Adult)  Goal: Plan of Care Review  Outcome: Ongoing (interventions implemented as appropriate)    04/06/17 8291   Coping/Psychosocial Response Interventions   Plan Of Care Reviewed With patient   Patient Care Overview   Progress no change   Outcome Evaluation   Outcome Summary/Follow up Plan vss meets pacu dc criteria         Problem: Perioperative Period (Adult)  Goal: Signs and Symptoms of Listed Potential Problems Will be Absent or Manageable (Perioperative Period)  Outcome: Ongoing (interventions implemented as appropriate)

## 2017-04-06 NOTE — ANESTHESIA POSTPROCEDURE EVALUATION
Patient: Chris Valdez    Procedure Summary     Date Anesthesia Start Anesthesia Stop Room / Location    04/06/17 1540 1800 [facility] OR 11 / [facility]       Procedure Diagnosis Surgeon Provider    ANKLE OPEN REDUCTION INTERNAL FIXATION (Left Ankle) Closed trimalleolar fracture of left ankle, sequela  (Closed trimalleolar fracture of left ankle, sequela [S82.859X]) MD Michael Montelongo MD          Anesthesia Type: general  Last vitals  BP      Temp      Pulse     Resp      SpO2        Post Anesthesia Care and Evaluation    Patient location during evaluation: PACU  Patient participation: complete - patient participated  Level of consciousness: awake and alert  Pain management: adequate  Airway patency: patent  Anesthetic complications: No anesthetic complications    Cardiovascular status: acceptable  Respiratory status: acceptable  Hydration status: acceptable

## 2017-04-06 NOTE — ANESTHESIA PREPROCEDURE EVALUATION
Anesthesia Evaluation     Patient summary reviewed and Nursing notes reviewed   no history of anesthetic complications:  NPO Status: > 8 hours   Airway   Mallampati: II  TM distance: >3 FB  Neck ROM: full  no difficulty expected  Dental - normal exam     Pulmonary - normal exam   (+) recent URI,   Cardiovascular - normal exam        Neuro/Psych  (+) headaches, syncope, psychiatric history Anxiety and Depression,    GI/Hepatic/Renal/Endo    (+) obesity,      Musculoskeletal     (+) neck pain,   Abdominal  - normal exam   Substance History      OB/GYN          Other                                    Anesthesia Plan    ASA 3     general     intravenous induction   Anesthetic plan and risks discussed with patient.

## 2017-04-06 NOTE — DISCHARGE INSTRUCTIONS
Do not remove splint  Keep dry  Keep leg elevated.  Do not put weight on leg  Return in 3 weeks for follow up evaluation in the orthopaedic surgery offices.

## 2017-04-06 NOTE — ANESTHESIA PROCEDURE NOTES
Airway  Urgency: elective    End Time:4/6/2017 3:47 PM  Airway not difficult    General Information and Staff    Patient location during procedure: OR  CRNA: WALLY ARZATE    Indications and Patient Condition  Indications for airway management: airway protection    Preoxygenated: yes  MILS maintained throughout  Mask difficulty assessment: 0 - not attempted    Final Airway Details  Final airway type: supraglottic airway      Successful airway: classic  Size 4    Number of attempts at approach: 1

## 2017-04-24 ENCOUNTER — TELEPHONE (OUTPATIENT)
Dept: ORTHOPEDIC SURGERY | Facility: CLINIC | Age: 37
End: 2017-04-24

## 2017-05-01 ENCOUNTER — OFFICE VISIT (OUTPATIENT)
Dept: ORTHOPEDIC SURGERY | Facility: CLINIC | Age: 37
End: 2017-05-01

## 2017-05-01 DIAGNOSIS — S82.852S CLOSED TRIMALLEOLAR FRACTURE OF LEFT ANKLE, SEQUELA: Primary | ICD-10-CM

## 2017-05-01 PROCEDURE — 99024 POSTOP FOLLOW-UP VISIT: CPT | Performed by: ORTHOPAEDIC SURGERY

## 2017-05-01 PROCEDURE — 29405 APPL SHORT LEG CAST: CPT | Performed by: ORTHOPAEDIC SURGERY

## 2017-05-01 PROCEDURE — 73610 X-RAY EXAM OF ANKLE: CPT | Performed by: ORTHOPAEDIC SURGERY

## 2017-06-02 ENCOUNTER — OFFICE VISIT (OUTPATIENT)
Dept: ORTHOPEDIC SURGERY | Facility: CLINIC | Age: 37
End: 2017-06-02

## 2017-06-02 VITALS — HEIGHT: 64 IN

## 2017-06-02 DIAGNOSIS — S82.852D CLOSED TRIMALLEOLAR FRACTURE OF LEFT ANKLE, WITH ROUTINE HEALING, SUBSEQUENT ENCOUNTER: Primary | ICD-10-CM

## 2017-06-02 PROCEDURE — 73610 X-RAY EXAM OF ANKLE: CPT | Performed by: ORTHOPAEDIC SURGERY

## 2017-06-02 PROCEDURE — 99024 POSTOP FOLLOW-UP VISIT: CPT | Performed by: ORTHOPAEDIC SURGERY

## 2017-06-02 NOTE — PROGRESS NOTES
"Chris Valdez is a 37 y.o. female returns for     Chief Complaint   Patient presents with   • Left Ankle - Follow-up   • Cast Removal     x-rays   • Wound Check     X-rays done  Today in office   Pain scale today 5/10   HISTORY OF PRESENT ILLNESS:    Returns following removal of cast.  She has been compliant with weight bearing restrictions.  No problems with cast.  Pain controlled.     CONCURRENT MEDICAL HISTORY:    Past Medical History:   Diagnosis Date   • Abdominal pain    • Acute bronchitis    • Acute upper respiratory infection    • Anxiety state    • Backache    • Bell's palsy    • Cellulitis      left breast      • Chest wall pain     right lateral chest wall-poss early shingles      • Chest wall pain 10/21/2015    right lateral chest wall-poss early shingles      • Chronic low back pain     May 13 2014 MRI ddd with mild left neuroforminal narrowing l4-l5      • Chronic retention of urine     ACUTE   • Cough    • Depressive disorder    • Dysuria    • Facial hemiparesis    • Facial hemiparesis    • Fatigue    • Fatigue    • Fever    • Ganglion cyst     LEFT BREAST   • Generalized anxiety disorder    • Headache    • Insomnia    • Pain and other symptoms associated with female genital organs    • Pain radiating to back     LUMBAR   • Postnasal drip    • Retention of urine    • Skin sensation disturbance    • Urinary incontinence    • Urinary tract infectious disease    • Viral gastroenteritis    • Vulvovaginitis        Allergies   Allergen Reactions   • Compazine [Prochlorperazine Edisylate] Anxiety     \"coming out of my skin\"         Current Outpatient Prescriptions:   •  baclofen (LIORESAL) 10 MG tablet, Take 10 mg by mouth 3 (Three) Times a Day., Disp: , Rfl:   •  baclofen (LIORESAL) 10 MG tablet, Take 1 tablet(s) by mouth three times a day, Disp: 90 tablet, Rfl: 3  •  clonazePAM (KlonoPIN) 1 MG tablet, Take 1 tablet by mouth 3 (three) times a day as needed for seizures. (Patient taking differently: " Take 1 mg by mouth 3 (Three) Times a Day As Needed (FOR ANXIETY. NOT SEIZURES).), Disp: 60 tablet, Rfl: 0  •  DULoxetine (CYMBALTA) 60 MG capsule, Take 1 capsule by mouth Daily., Disp: 30 capsule, Rfl: 11  •  HYDROcodone-acetaminophen (NORCO)  MG per tablet, Take 1 tablet by mouth every 6 (six) hours as needed for moderate pain (4-6)., Disp: , Rfl:   •  HYDROcodone-acetaminophen (NORCO)  MG per tablet, Take 1 tablet by mouth 4 (Four) Times a Day As Needed as needed for pain., Disp: 120 tablet, Rfl: 0  •  levonorgestrel (MIRENA) 20 MCG/24HR IUD, 1 each by Intrauterine route 1 (one) time., Disp: , Rfl:   •  melatonin 5 MG tablet tablet, Take 10 mg by mouth Every Night., Disp: , Rfl:   •  ondansetron (ZOFRAN) 4 MG tablet, Take 1 tablet by mouth Every 6 (Six) Hours As Needed for Nausea or Vomiting., Disp: 10 tablet, Rfl: 0  •  oxyCODONE-acetaminophen (PERCOCET) 7.5-325 MG per tablet, Take 1 tablet by mouth Every 6 (Six) Hours As Needed for pain., Disp: 80 tablet, Rfl: 0  •  promethazine (PHENERGAN) 12.5 MG tablet, Take 1 tablet by mouth Every 6 (Six) Hours As Needed for Nausea or Vomiting., Disp: 20 tablet, Rfl: 0  •  SUMAtriptan (IMITREX) 100 MG tablet, Take 100 mg by mouth every 2 (two) hours as needed for migraine., Disp: , Rfl:   •  temazepam (RESTORIL) 30 MG capsule, Take 1 capsule by mouth every night at bedtime., Disp: 30 capsule, Rfl: 0  •  traMADol (ULTRAM) 50 MG tablet, Take 1 tablet by mouth 2 (Two) Times a Day., Disp: 60 tablet, Rfl: 0  •  traZODone (DESYREL) 100 MG tablet, Take 1 tablet by mouth at bedtime., Disp: 30 tablet, Rfl: 0  •  traZODone (DESYREL) 150 MG tablet, Take 1 tablet by mouth at bedtime., Disp: 30 tablet, Rfl: 0    Past Surgical History:   Procedure Laterality Date   • ANKLE OPEN REDUCTION INTERNAL FIXATION Left 2017    Procedure: ANKLE OPEN REDUCTION INTERNAL FIXATION;  Surgeon: Leo Cisneros MD;  Location: [facility];  Service:    • APPENDECTOMY     •   "SECTION     • CHOLECYSTECTOMY     • INJECTION OF MEDICATION  02/21/2016    KENALOG(4)   • INJECTION OF MEDICATION  01/17/2011    PHENERGAN(1)   • INJECTION OF MEDICATION  09/14/2015    ROCEPHIN(1)   • INJECTION OF MEDICATION  06/05/2015    TORADOL(2)       ROS  No fevers or chills.  No chest pain or shortness of air.  No GI or  disturbances.    PHYSICAL EXAMINATION:       Ht 64\" (162.6 cm)    Physical Exam    GAIT:     []  Normal  []  Antalgic    Assistive device: []  None  []  Walker     []  Crutches  []  Cane     [x]  Wheelchair  []  Stretcher    Ortho Exam  Incisions healing well, minimal erythema.  No drainage  Plantarflexion 15, dorsiflexion 0      Xr Ankle 3+ View Left    Result Date: 6/30/2017  Narrative: Left ankle 3 views fracture fixation, excellent alignment, bone healing evident.  Ankle well aligned.    Left ankle- correct alignment, fixation in place, healing ankle fracture.        ASSESSMENT:    Diagnoses and all orders for this visit:    Closed trimalleolar fracture of left ankle, with routine healing, subsequent encounter          PLAN    Begin weight bearing to stand, no ambulation with weight on the left leg, and I stressed this with her.  Xray left ankle.      Leo Cisneros MD  "

## 2017-06-30 ENCOUNTER — OFFICE VISIT (OUTPATIENT)
Dept: ORTHOPEDIC SURGERY | Facility: CLINIC | Age: 37
End: 2017-06-30

## 2017-06-30 DIAGNOSIS — S82.852D CLOSED TRIMALLEOLAR FRACTURE OF LEFT ANKLE, WITH ROUTINE HEALING, SUBSEQUENT ENCOUNTER: Primary | ICD-10-CM

## 2017-06-30 PROCEDURE — 99024 POSTOP FOLLOW-UP VISIT: CPT | Performed by: ORTHOPAEDIC SURGERY

## 2017-06-30 NOTE — PROGRESS NOTES
The patient is a 37 y.o. female who presents for followup.    Chief Complaint   Patient presents with   • Left Ankle - Follow-up     xrays done today.        HPI: Procedure(s):  ANKLE OPEN REDUCTION INTERNAL FIXATION done on 4/6/2017    She is progressing slowly, states she has pain and stiffness.        Current Outpatient Prescriptions:   •  baclofen (LIORESAL) 10 MG tablet, Take 10 mg by mouth 3 (Three) Times a Day., Disp: , Rfl:   •  baclofen (LIORESAL) 10 MG tablet, Take 1 tablet(s) by mouth three times a day, Disp: 90 tablet, Rfl: 3  •  clonazePAM (KlonoPIN) 1 MG tablet, Take 1 tablet by mouth 3 (three) times a day as needed for seizures. (Patient taking differently: Take 1 mg by mouth 3 (Three) Times a Day As Needed (FOR ANXIETY. NOT SEIZURES).), Disp: 60 tablet, Rfl: 0  •  DULoxetine (CYMBALTA) 60 MG capsule, Take 1 capsule by mouth Daily., Disp: 30 capsule, Rfl: 11  •  HYDROcodone-acetaminophen (NORCO)  MG per tablet, Take 1 tablet by mouth every 6 (six) hours as needed for moderate pain (4-6)., Disp: , Rfl:   •  HYDROcodone-acetaminophen (NORCO)  MG per tablet, Take 1 tablet by mouth 4 (Four) Times a Day As Needed as needed for pain., Disp: 120 tablet, Rfl: 0  •  levonorgestrel (MIRENA) 20 MCG/24HR IUD, 1 each by Intrauterine route 1 (one) time., Disp: , Rfl:   •  melatonin 5 MG tablet tablet, Take 10 mg by mouth Every Night., Disp: , Rfl:   •  ondansetron (ZOFRAN) 4 MG tablet, Take 1 tablet by mouth Every 6 (Six) Hours As Needed for Nausea or Vomiting., Disp: 10 tablet, Rfl: 0  •  oxyCODONE-acetaminophen (PERCOCET) 7.5-325 MG per tablet, Take 1 tablet by mouth Every 6 (Six) Hours As Needed for pain., Disp: 80 tablet, Rfl: 0  •  promethazine (PHENERGAN) 12.5 MG tablet, Take 1 tablet by mouth Every 6 (Six) Hours As Needed for Nausea or Vomiting., Disp: 20 tablet, Rfl: 0  •  SUMAtriptan (IMITREX) 100 MG tablet, Take 100 mg by mouth every 2 (two) hours as needed for migraine., Disp: , Rfl:   •   "temazepam (RESTORIL) 30 MG capsule, Take 1 capsule by mouth every night at bedtime., Disp: 30 capsule, Rfl: 0  •  traMADol (ULTRAM) 50 MG tablet, Take 1 tablet by mouth 2 (Two) Times a Day., Disp: 60 tablet, Rfl: 0  •  traZODone (DESYREL) 100 MG tablet, Take 1 tablet by mouth at bedtime., Disp: 30 tablet, Rfl: 0    Allergies   Allergen Reactions   • Compazine [Prochlorperazine Edisylate] Anxiety     \"coming out of my skin\"       ROS:  No fevers or chills.  No nausea or vomiting    PHYSICAL EXAM:    There were no vitals filed for this visit.    GAIT:     []  Normal  []  Antalgic    Assistive device: []  None  []  Walker     [x]  Crutches  []  Cane     []  Wheelchair  []  Stretcher    Patient is awake and alert, answers questions appropriately, and is in no apparent distress.    Ankle dorsiflexion 0  Plantarflexion 20  Swelling minimal, incisions healed.      Xr Ankle 3+ View Left    Result Date: 6/30/2017  Narrative: Left ankle 3 views fracture fixation, excellent alignment, bone healing evident.  Ankle well aligned.    Xr Ankle 3+ View Left    Result Date: 6/5/2017  Narrative: Left ankle- correct alignment, fixation in place, healing ankle fracture.      ASSESSMENT:  Diagnoses and all orders for this visit:    Closed trimalleolar fracture of left ankle, with routine healing, subsequent encounter        PLAN:    Weight bearing as tolerated, return to work, knee rollator for mobility      Leo Cisneros MD  "

## 2018-03-05 ENCOUNTER — OFFICE VISIT (OUTPATIENT)
Dept: OBSTETRICS AND GYNECOLOGY | Facility: CLINIC | Age: 38
End: 2018-03-05

## 2018-03-05 VITALS
BODY MASS INDEX: 35.82 KG/M2 | SYSTOLIC BLOOD PRESSURE: 120 MMHG | HEIGHT: 65 IN | WEIGHT: 215 LBS | DIASTOLIC BLOOD PRESSURE: 72 MMHG

## 2018-03-05 DIAGNOSIS — Z01.419 CERVICAL SMEAR, AS PART OF ROUTINE GYNECOLOGICAL EXAMINATION: Primary | ICD-10-CM

## 2018-03-05 PROCEDURE — 88142 CYTOPATH C/V THIN LAYER: CPT | Performed by: OBSTETRICS & GYNECOLOGY

## 2018-03-05 PROCEDURE — 99395 PREV VISIT EST AGE 18-39: CPT | Performed by: OBSTETRICS & GYNECOLOGY

## 2018-03-05 PROCEDURE — 87624 HPV HI-RISK TYP POOLED RSLT: CPT | Performed by: OBSTETRICS & GYNECOLOGY

## 2018-03-05 NOTE — PROGRESS NOTES
Subjective   Chief Complaint   Patient presents with   • Gynecologic Exam     Chris Valdez is a 37 y.o. year old  presenting to be seen for her annual exam.  Today she Has no gynecological complaints.  She needs to get her IUD changed out.    No LMP recorded. Patient is not currently having periods (Reason: Other).    Current birth control method: IUD - Mirena.    Past Medical History:   Diagnosis Date   • Abdominal pain    • Acute bronchitis    • Acute upper respiratory infection    • Anxiety state    • Backache    • Bell's palsy    • Cellulitis      left breast      • Chest wall pain     right lateral chest wall-poss early shingles      • Chest wall pain 10/21/2015    right lateral chest wall-poss early shingles      • Chronic low back pain     May 13 2014 MRI ddd with mild left neuroforminal narrowing l4-l5      • Chronic retention of urine     ACUTE   • Cough    • Depressive disorder    • Dysuria    • Facial hemiparesis    • Facial hemiparesis    • Fatigue    • Fatigue    • Fever    • Ganglion cyst     LEFT BREAST   • Generalized anxiety disorder    • Headache    • Insomnia    • Pain and other symptoms associated with female genital organs    • Pain radiating to back     LUMBAR   • Postnasal drip    • Retention of urine    • Skin sensation disturbance    • Urinary incontinence    • Urinary tract infectious disease    • Viral gastroenteritis    • Vulvovaginitis      OB History      Para Term  AB Living    1 1 1   1    SAB TAB Ectopic Multiple Live Births        1        Past Surgical History:   Procedure Laterality Date   • ANKLE OPEN REDUCTION INTERNAL FIXATION Left 2017    Procedure: ANKLE OPEN REDUCTION INTERNAL FIXATION;  Surgeon: Leo Cisneros MD;  Location: [facility];  Service:    • APPENDECTOMY     •  SECTION     • CHOLECYSTECTOMY     • INJECTION OF MEDICATION  2016    KENALOG(4)   • INJECTION OF MEDICATION  2011    PHENERGAN(1)   • INJECTION  OF MEDICATION  09/14/2015    ROCEPHIN(1)   • INJECTION OF MEDICATION  06/05/2015    TORADOL(2)     Family History   Problem Relation Age of Onset   • Alzheimer's disease Other    • ADD / ADHD Other    • Depression Other    • Diabetes Other    • Migraines Other        Current Outpatient Prescriptions:   •  celecoxib (CELEBREX) 200 MG capsule, Take 1 capsule by mouth daily with food., Disp: 30 capsule, Rfl: 1  •  cyclobenzaprine (FLEXERIL) 10 MG tablet, Take 1 tablet by mouth 3 (three) times a day., Disp: 90 tablet, Rfl: 1  •  famotidine (PEPCID) 20 MG tablet, Take 1 tablet by mouth twice a day., Disp: 60 tablet, Rfl: 1  •  HYDROcodone-acetaminophen (NORCO)  MG per tablet, Take 1 tablet by mouth every 4 to 6 hours as needed for pain **do not take more then 5 tablets a day**, Disp: 150 tablet, Rfl: 0  •  levonorgestrel (MIRENA) 20 MCG/24HR IUD, 1 each by Intrauterine route 1 (one) time., Disp: , Rfl:   •  melatonin 5 MG tablet tablet, Take 10 mg by mouth Every Night., Disp: , Rfl:   •  ondansetron (ZOFRAN) 4 MG tablet, Take 1 tablet by mouth Every 6 (Six) Hours As Needed for Nausea or Vomiting., Disp: 10 tablet, Rfl: 0  •  promethazine (PHENERGAN) 12.5 MG tablet, Take 1 tablet by mouth Every 6 (Six) Hours As Needed for Nausea or Vomiting., Disp: 20 tablet, Rfl: 0  •  SUMAtriptan (IMITREX) 100 MG tablet, Take 100 mg by mouth every 2 (two) hours as needed for migraine., Disp: , Rfl:   •  traMADol (ULTRAM) 50 MG tablet, Take 1 tablet by mouth 2 (Two) Times a Day., Disp: 60 tablet, Rfl: 0  •  traZODone (DESYREL) 150 MG tablet, Take 2 tablets by mouth at bedtime., Disp: 60 tablet, Rfl: 1  Social History     Social History   • Marital status:      Spouse name: N/A   • Number of children: N/A   • Years of education: N/A     Occupational History   • Not on file.     Social History Main Topics   • Smoking status: Current Every Day Smoker     Packs/day: 1.00   • Smokeless tobacco: Never Used   • Alcohol use No   •  "Drug use: No   • Sexual activity: Defer     Other Topics Concern   • Not on file     Social History Narrative     Allergies   Allergen Reactions   • Compazine [Prochlorperazine Edisylate] Anxiety     \"coming out of my skin\"         Smoking status: Current Every Day Smoker                                                   Packs/day: 1.00      Years: 0.00      Smokeless status: Never Used                        Review of Systems   Constitutional: Negative for activity change, appetite change, chills and fever.   Gastrointestinal: Negative for abdominal distention and abdominal pain.   Genitourinary: Negative for difficulty urinating, dysuria, flank pain, genital sores, pelvic pain, vaginal bleeding, vaginal discharge and vaginal pain.         Objective   /72  Ht 165.1 cm (65\")  Wt 97.5 kg (215 lb)  Breastfeeding? No  BMI 35.78 kg/m2    General:  well developed; well nourished  no acute distress   Thyroid: normal to inspection and palpation   Heart:: regular rate and rhythm   Lungs: breathing is unlabored  clear to auscultation bilaterally   Breasts:  Examined in supine position  Symmetric without masses or skin dimpling  Nipples normal without inversion, lesions or discharge  There are no palpable axillary nodes   Abdomen: soft, non-tender; no masses  no umbilical or inginual hernias are present  no hepato-splenomegaly   Pelvis: Clinical staff was present for exam  External genitalia:  normal appearance of the external genitalia including Bartholin's and [place]'s glands.  :  urethral meatus normal;  Vaginal:  normal pink mucosa without prolapse or lesions  Cervix:  normal appearance. IUD string not seen;  Uterus:  normal size, shape and consistency  Adnexa:  normal bimanual exam of the adnexa.  Rectal:  digital rectal exam not performed; anus visually normal appearing.     Lab Review   No data reviewed    Pap performed: Yes    Imaging  No data reviewed       Assessment      Diagnosis Plan   1. Cervical " smear, as part of routine gynecological examination  Liquid-based Pap Smear, Screening          Plan   1. A Pap smear was performed, the patient will be notified by mail of her Pap smear result  2. She is to follow-up on Friday to have her IUD changed out         Jasmeet Castañeda MD  March 5, 2018

## 2018-03-07 LAB
LAB AP CASE REPORT: NORMAL
LAB AP GYN ADDITIONAL INFORMATION: NORMAL
LAB AP GYN OTHER FINDINGS: NORMAL
Lab: NORMAL
PATH INTERP SPEC-IMP: NORMAL
STAT OF ADQ CVX/VAG CYTO-IMP: NORMAL

## 2018-03-08 LAB — HPV I/H RISK 4 DNA CVX QL PROBE+SIG AMP: NEGATIVE

## 2018-03-09 ENCOUNTER — OFFICE VISIT (OUTPATIENT)
Dept: OBSTETRICS AND GYNECOLOGY | Facility: CLINIC | Age: 38
End: 2018-03-09

## 2018-03-09 DIAGNOSIS — Z30.433 ENCOUNTER FOR IUD REMOVAL AND REINSERTION: Primary | ICD-10-CM

## 2018-03-09 PROCEDURE — 58301 REMOVE INTRAUTERINE DEVICE: CPT | Performed by: OBSTETRICS & GYNECOLOGY

## 2018-03-09 PROCEDURE — 58300 INSERT INTRAUTERINE DEVICE: CPT | Performed by: OBSTETRICS & GYNECOLOGY

## 2018-03-09 NOTE — PROGRESS NOTES
IUD Removal and Immediate Reinsertion    No LMP recorded. Patient is not currently having periods (Reason: Other).    Date of procedure:  3/9/2018    Risks and benefits discussed? yes  All questions answered? yes  Consents given by the patient  Written consent obtained? yes  Reason for removal: Device expiration    Local anesthesia used:  no  Mirena [id number]    Procedure documentation:    A speculum was placed in order to view the cervix.  The cervix was cleansed with an antiseptic solution.The cervix was grasped using a Allis Clamp.  Cervical dilation did not need to be performed in order to access the string.  The IUD string was easily seen.  The string was grasped and the IUD was removed without difficulty.  The IUD did not appear to be adherent to the uterine cavity. It was removed intact.    The uterine cavity was then sounded.  There was no difficulty passing the sound through the cervix.  Cervical dilation did not need to be performed prior to pacing the IUD.  The uterus was anteverted and sounded to 7 cms.  The Mirena was then prepared per the manufacturers instructions.    The Mirena was advanced to a point 2 cms from the fundus and then the arms were released from the sheath.  The device was advanced to the fundus and the device was released fully from the sheath.. The string was cut 3 cms in length.  Bleeding from the cervix was scant.    She tolerated the procedure without any difficulty.     Post procedure instructions: Call if any fever or excessive bleeding or pain.    Follow up needed:         3 weeks for string check, sooner if she has any          problems    This note was electronically signed.    Jasmeet Castañeda MD

## 2018-08-02 DIAGNOSIS — S82.852D CLOSED TRIMALLEOLAR FRACTURE OF LEFT ANKLE, WITH ROUTINE HEALING, SUBSEQUENT ENCOUNTER: Primary | ICD-10-CM

## 2018-08-03 ENCOUNTER — OFFICE VISIT (OUTPATIENT)
Dept: ORTHOPEDIC SURGERY | Facility: CLINIC | Age: 38
End: 2018-08-03

## 2018-08-03 VITALS — HEIGHT: 65 IN | BODY MASS INDEX: 35.49 KG/M2 | WEIGHT: 213 LBS

## 2018-08-03 DIAGNOSIS — M19.172 POST-TRAUMATIC ARTHRITIS OF LEFT ANKLE: ICD-10-CM

## 2018-08-03 DIAGNOSIS — S82.852D CLOSED TRIMALLEOLAR FRACTURE OF LEFT ANKLE, WITH ROUTINE HEALING, SUBSEQUENT ENCOUNTER: Primary | ICD-10-CM

## 2018-08-03 PROCEDURE — 99213 OFFICE O/P EST LOW 20 MIN: CPT | Performed by: ORTHOPAEDIC SURGERY

## 2018-08-03 RX ORDER — CELECOXIB 200 MG/1
200 CAPSULE ORAL DAILY
COMMUNITY
End: 2018-09-24 | Stop reason: SDUPTHER

## 2018-08-03 NOTE — PROGRESS NOTES
"Chris Valdez is a 38 y.o. female returns for     Chief Complaint   Patient presents with   • Left Ankle - Follow-up       HISTORY OF PRESENT ILLNESS: Patient being seen for left ankle follow up. X-rays done today.   States she has pain with ambulation and swelling.  Pain is present with standing for prolonged periods of time.         CONCURRENT MEDICAL HISTORY:    Past Medical History:   Diagnosis Date   • Abdominal pain    • Acute bronchitis    • Acute upper respiratory infection    • Anxiety state    • Backache    • Bell's palsy    • Cellulitis      left breast      • Chest wall pain     right lateral chest wall-poss early shingles      • Chest wall pain 10/21/2015    right lateral chest wall-poss early shingles      • Chronic low back pain     May 13 2014 MRI ddd with mild left neuroforminal narrowing l4-l5      • Chronic retention of urine     ACUTE   • Cough    • Depressive disorder    • Dysuria    • Facial hemiparesis (CMS/HCC)    • Facial hemiparesis (CMS/HCC)    • Fatigue    • Fatigue    • Fever    • Ganglion cyst     LEFT BREAST   • Generalized anxiety disorder    • Headache    • Insomnia    • Pain and other symptoms associated with female genital organs    • Pain radiating to back     LUMBAR   • Postnasal drip    • Retention of urine    • Skin sensation disturbance    • Urinary incontinence    • Urinary tract infectious disease    • Viral gastroenteritis    • Vulvovaginitis        Allergies   Allergen Reactions   • Compazine [Prochlorperazine Edisylate] Anxiety     \"coming out of my skin\"         Current Outpatient Prescriptions:   •  celecoxib (CeleBREX) 200 MG capsule, Take 200 mg by mouth Daily., Disp: , Rfl:   •  cyclobenzaprine (FLEXERIL) 10 MG tablet, Take 1 tablet by mouth 3 (three) times a day., Disp: 90 tablet, Rfl: 3  •  famotidine (PEPCID) 20 MG tablet, Take 1 tablet by mouth twice a day., Disp: 60 tablet, Rfl: 3  •  HYDROmorphone (DILAUDID) 4 MG tablet, Take 1 tablet by mouth 4 (Four) " "Times a Day., Disp: 120 tablet, Rfl: 0  •  melatonin 5 MG tablet tablet, Take 10 mg by mouth Every Night., Disp: , Rfl:   •  ondansetron (ZOFRAN) 4 MG tablet, Take 1 tablet by mouth Every 6 (Six) Hours As Needed for Nausea or Vomiting., Disp: 10 tablet, Rfl: 0  •  SUMAtriptan (IMITREX) 100 MG tablet, Take 100 mg by mouth every 2 (two) hours as needed for migraine., Disp: , Rfl:   •  traZODone (DESYREL) 150 MG tablet, Take 2 tablets by mouth every night at bedtime., Disp: 60 tablet, Rfl: 3  •  levonorgestrel (MIRENA, 52 MG,) 20 MCG/24HR IUD, 1 each by Intrauterine route 1 (One) Time for 1 dose., Disp: 1 each, Rfl: 0  •  promethazine (PHENERGAN) 12.5 MG tablet, Take 1 tablet by mouth Every 6 (Six) Hours As Needed for Nausea or Vomiting., Disp: 20 tablet, Rfl: 0    Past Surgical History:   Procedure Laterality Date   • ANKLE OPEN REDUCTION INTERNAL FIXATION Left 2017    Procedure: ANKLE OPEN REDUCTION INTERNAL FIXATION;  Surgeon: Leo Cisneros MD;  Location: [facility];  Service:    • APPENDECTOMY     •  SECTION     • CHOLECYSTECTOMY     • INJECTION OF MEDICATION  2016    KENALOG(4)   • INJECTION OF MEDICATION  2011    PHENERGAN(1)   • INJECTION OF MEDICATION  2015    ROCEPHIN(1)   • INJECTION OF MEDICATION  2015    TORADOL(2)       ROS  No fevers or chills.  No chest pain or shortness of air.  No GI or  disturbances.    PHYSICAL EXAMINATION:       Ht 165.1 cm (65\")   Wt 96.6 kg (213 lb)   BMI 35.45 kg/m²     Physical Exam    GAIT:     []  Normal  []  Antalgic    Assistive device: [x]  None  []  Walker     []  Crutches  []  Cane     []  Wheelchair  []  Stretcher    Left Ankle Exam     Tenderness   The patient is experiencing tenderness in the deltoid.     Range of Motion   Dorsiflexion: 15   Plantar flexion: 15   Inversion: 10   Eversion: 10                             ASSESSMENT:    Diagnoses and all orders for this visit:    Closed trimalleolar fracture of left " ankle, with routine healing, subsequent encounter    Post-traumatic arthritis of left ankle    Other orders  -     celecoxib (CeleBREX) 200 MG capsule; Take 200 mg by mouth Daily.          PLAN    Rest, compression, elevation, will follow, may have developing post traumatic arthritis  Continue celebrex, therabands and stretching.      Leo Cisneros MD

## 2018-09-24 PROBLEM — B97.89 VIRAL RESPIRATORY ILLNESS: Status: ACTIVE | Noted: 2018-09-24

## 2018-09-24 PROBLEM — R51.9 ACUTE NONINTRACTABLE HEADACHE: Status: ACTIVE | Noted: 2018-09-24

## 2018-09-24 PROBLEM — J98.8 VIRAL RESPIRATORY ILLNESS: Status: ACTIVE | Noted: 2018-09-24

## 2019-01-02 ENCOUNTER — LAB (OUTPATIENT)
Dept: LAB | Facility: HOSPITAL | Age: 39
End: 2019-01-02

## 2019-01-02 ENCOUNTER — OFFICE VISIT (OUTPATIENT)
Dept: ORTHOPEDIC SURGERY | Facility: CLINIC | Age: 39
End: 2019-01-02

## 2019-01-02 VITALS — HEIGHT: 64 IN | BODY MASS INDEX: 36.81 KG/M2 | WEIGHT: 215.6 LBS

## 2019-01-02 DIAGNOSIS — M19.172 POST-TRAUMATIC ARTHRITIS OF LEFT ANKLE: Primary | ICD-10-CM

## 2019-01-02 DIAGNOSIS — M19.172 POST-TRAUMATIC ARTHRITIS OF LEFT ANKLE: ICD-10-CM

## 2019-01-02 DIAGNOSIS — S82.852D CLOSED TRIMALLEOLAR FRACTURE OF LEFT ANKLE WITH ROUTINE HEALING, SUBSEQUENT ENCOUNTER: ICD-10-CM

## 2019-01-02 LAB
BASOPHILS # BLD AUTO: 0.03 10*3/MM3 (ref 0–0.2)
BASOPHILS NFR BLD AUTO: 0.4 % (ref 0–2)
CRP SERPL-MCNC: 0.8 MG/DL (ref 0–1)
DEPRECATED RDW RBC AUTO: 43.9 FL (ref 36.4–46.3)
EOSINOPHIL # BLD AUTO: 0.32 10*3/MM3 (ref 0–0.7)
EOSINOPHIL NFR BLD AUTO: 3.8 % (ref 0–7)
ERYTHROCYTE [DISTWIDTH] IN BLOOD BY AUTOMATED COUNT: 13.2 % (ref 11.5–14.5)
ERYTHROCYTE [SEDIMENTATION RATE] IN BLOOD: 20 MM/HR (ref 0–20)
HCT VFR BLD AUTO: 43.6 % (ref 35–45)
HGB BLD-MCNC: 14.6 G/DL (ref 12–15.5)
IMM GRANULOCYTES # BLD AUTO: 0.01 10*3/MM3 (ref 0–0.02)
IMM GRANULOCYTES NFR BLD AUTO: 0.1 % (ref 0–0.5)
LYMPHOCYTES # BLD AUTO: 3.15 10*3/MM3 (ref 0.6–4.2)
LYMPHOCYTES NFR BLD AUTO: 37.1 % (ref 10–50)
MCH RBC QN AUTO: 30.2 PG (ref 26.5–34)
MCHC RBC AUTO-ENTMCNC: 33.5 G/DL (ref 31.4–36)
MCV RBC AUTO: 90.3 FL (ref 80–98)
MONOCYTES # BLD AUTO: 0.49 10*3/MM3 (ref 0–0.9)
MONOCYTES NFR BLD AUTO: 5.8 % (ref 0–12)
NEUTROPHILS # BLD AUTO: 4.48 10*3/MM3 (ref 2–8.6)
NEUTROPHILS NFR BLD AUTO: 52.8 % (ref 37–80)
NRBC BLD AUTO-RTO: 0 /100 WBC (ref 0–0)
PLATELET # BLD AUTO: 278 10*3/MM3 (ref 150–450)
PMV BLD AUTO: 11.2 FL (ref 8–12)
RBC # BLD AUTO: 4.83 10*6/MM3 (ref 3.77–5.16)
WBC NRBC COR # BLD: 8.48 10*3/MM3 (ref 3.2–9.8)

## 2019-01-02 PROCEDURE — 36415 COLL VENOUS BLD VENIPUNCTURE: CPT

## 2019-01-02 PROCEDURE — 85025 COMPLETE CBC W/AUTO DIFF WBC: CPT

## 2019-01-02 PROCEDURE — 86140 C-REACTIVE PROTEIN: CPT

## 2019-01-02 PROCEDURE — 99214 OFFICE O/P EST MOD 30 MIN: CPT | Performed by: NURSE PRACTITIONER

## 2019-01-02 PROCEDURE — 20605 DRAIN/INJ JOINT/BURSA W/O US: CPT | Performed by: NURSE PRACTITIONER

## 2019-01-02 PROCEDURE — 85651 RBC SED RATE NONAUTOMATED: CPT

## 2019-01-02 RX ORDER — TRIAMCINOLONE ACETONIDE 40 MG/ML
40 INJECTION, SUSPENSION INTRA-ARTICULAR; INTRAMUSCULAR
Status: COMPLETED | OUTPATIENT
Start: 2019-01-02 | End: 2019-01-02

## 2019-01-02 RX ORDER — LIDOCAINE HYDROCHLORIDE 10 MG/ML
2 INJECTION, SOLUTION EPIDURAL; INFILTRATION; INTRACAUDAL; PERINEURAL
Status: COMPLETED | OUTPATIENT
Start: 2019-01-02 | End: 2019-01-02

## 2019-01-02 RX ADMIN — TRIAMCINOLONE ACETONIDE 40 MG: 40 INJECTION, SUSPENSION INTRA-ARTICULAR; INTRAMUSCULAR at 15:03

## 2019-01-02 RX ADMIN — LIDOCAINE HYDROCHLORIDE 2 ML: 10 INJECTION, SOLUTION EPIDURAL; INFILTRATION; INTRACAUDAL; PERINEURAL at 15:03

## 2019-01-02 NOTE — PROGRESS NOTES
"Chris Valdez is a 38 y.o. female returns for     Chief Complaint   Patient presents with   • Left Ankle - Follow-up       HISTORY OF PRESENT ILLNESS:   38-year-old  female who is a  over at the hospital in the office today for follow-up evaluation of left ankle pain.  She reports diffuse swelling in the left ankle with increased pain limited range of motion without any new injury.  She underwent an ORIF of the left ankle earlier this year for trimalleolar fracture by Dr. Cisneros and reported that her pain was improved after the surgery but however recently this pain has intensified dramatically.    CONCURRENT MEDICAL HISTORY:    The following portions of the patient's history were reviewed and updated as appropriate: allergies, current medications, past family history, past medical history, past social history, past surgical history and problem list.     ROS  No fevers or chills.  No chest pain or shortness of air.  No GI or  disturbances.    PHYSICAL EXAMINATION:       Ht 162.6 cm (64\")   Wt 97.8 kg (215 lb 9.6 oz)   BMI 37.01 kg/m²     Physical Exam   Constitutional: She is oriented to person, place, and time. Vital signs are normal. She appears well-developed and well-nourished. She is cooperative.   HENT:   Head: Normocephalic and atraumatic.   Neck: Trachea normal and phonation normal.   Pulmonary/Chest: Effort normal. No respiratory distress.   Abdominal: Soft. Normal appearance. She exhibits no distension.   Neurological: She is alert and oriented to person, place, and time. GCS eye subscore is 4. GCS verbal subscore is 5. GCS motor subscore is 6.   Skin: Skin is warm, dry and intact. Capillary refill takes less than 2 seconds.   Psychiatric: She has a normal mood and affect. Her speech is normal and behavior is normal. Judgment and thought content normal. Cognition and memory are normal.   Vitals reviewed.      GAIT:     []  Normal  [x]  Antalgic    Assistive device: []  None  []  " Walker     []  Crutches  []  Cane     []  Wheelchair  []  Stretcher    Right Ankle Exam   Right ankle exam is normal.      Left Ankle Exam     Tenderness   Left ankle tenderness location: Diffuse.   Swelling: mild    Range of Motion   Dorsiflexion: abnormal   Plantar flexion: abnormal   Eversion: abnormal   Inversion: abnormal     Muscle Strength   Dorsiflexion:  4/5   Plantar flexion:  4/5     Other   Erythema: absent  Scars: present  Sensation: normal  Pulse: present              No results found.          ASSESSMENT:    Diagnoses and all orders for this visit:    Post-traumatic arthritis of left ankle  -     C-reactive Protein; Future  -     Sedimentation Rate; Future  -     CBC & Differential; Future  -     Ambulatory Referral to Physical Therapy  -     Medium Joint Arthrocentesis: L ankle    Closed trimalleolar fracture of left ankle with routine healing, subsequent encounter  -     Medium Joint Arthrocentesis: L ankle    Medium Joint Arthrocentesis: L ankle  Date/Time: 1/2/2019 3:03 PM  Consent given by: patient  Timeout: Immediately prior to procedure a time out was called to verify the correct patient, procedure, equipment, support staff and site/side marked as required   Supporting Documentation  Indications: pain   Procedure Details  Location: ankle - L ankle  Preparation: Patient was prepped and draped in the usual sterile fashion  Needle size: 22 G  Approach: anterolateral  Medications administered: 2 mL lidocaine PF 1% 1 %; 40 mg triamcinolone acetonide 40 MG/ML  Patient tolerance: patient tolerated the procedure well with no immediate complications              PLAN  Review x-rays of the patient discussed options in detail recommending continued use of the compression stocking for the swelling, modified activity and we proceeded with an intra-articular injection of steroids today and recommended follow-up in 2-3 weeks for recheck.  If the pain continues then we'll recommend evaluation for possible  hardware removal.  No Follow-up on file.    Rhett Butler, APRN

## 2019-02-14 ENCOUNTER — TELEPHONE (OUTPATIENT)
Dept: ORTHOPEDIC SURGERY | Facility: CLINIC | Age: 39
End: 2019-02-14

## 2019-02-14 NOTE — TELEPHONE ENCOUNTER
LOPEZ PAUL IS WANTING ANOTHER INJECTION IN HER LFT ANKLE PT HAD HER LAST INJECTION ON 1/2/19  CALL PT AT [id number]

## 2019-03-29 ENCOUNTER — OFFICE VISIT (OUTPATIENT)
Dept: FAMILY MEDICINE CLINIC | Facility: CLINIC | Age: 39
End: 2019-03-29

## 2019-03-29 VITALS
SYSTOLIC BLOOD PRESSURE: 110 MMHG | HEIGHT: 64 IN | DIASTOLIC BLOOD PRESSURE: 80 MMHG | WEIGHT: 211 LBS | BODY MASS INDEX: 36.02 KG/M2

## 2019-03-29 DIAGNOSIS — G44.209 ACUTE NON INTRACTABLE TENSION-TYPE HEADACHE: ICD-10-CM

## 2019-03-29 DIAGNOSIS — F41.9 ANXIETY: Primary | ICD-10-CM

## 2019-03-29 PROCEDURE — 99213 OFFICE O/P EST LOW 20 MIN: CPT | Performed by: NURSE PRACTITIONER

## 2019-03-29 RX ORDER — BUSPIRONE HYDROCHLORIDE 10 MG/1
10 TABLET ORAL 3 TIMES DAILY
Qty: 90 TABLET | Refills: 5 | Status: SHIPPED | OUTPATIENT
Start: 2019-03-29 | End: 2019-05-13

## 2019-03-29 RX ORDER — TOPIRAMATE 50 MG/1
50 TABLET, FILM COATED ORAL DAILY
Qty: 30 TABLET | Refills: 11 | Status: SHIPPED | OUTPATIENT
Start: 2019-03-29 | End: 2019-04-12

## 2019-03-29 RX ORDER — SUMATRIPTAN 50 MG/1
TABLET, FILM COATED ORAL
Qty: 9 TABLET | Refills: 11 | Status: SHIPPED | OUTPATIENT
Start: 2019-03-29

## 2019-03-29 NOTE — PROGRESS NOTES
Chief Complaint   Patient presents with   • Headache     MIGRAINE ON AND OFF X 4 WEEKS    • Depression     NEEDS TO BE REFERED TO STACY     Subjective   Chris Valdez is a 38 y.o. female.     Headache    This is a recurrent problem. The current episode started 1 to 4 weeks ago. The problem has been gradually worsening. The pain is located in the bilateral region. The pain quality is similar to prior headaches. The quality of the pain is described as band-like. The pain is at a severity of 3/10. The pain is mild. Pertinent negatives include no abdominal pain, abnormal behavior, back pain, blurred vision, coughing, dizziness, drainage, ear pain, eye pain, eye redness, eye watering, facial sweating, fever, hearing loss, insomnia, loss of balance, muscle aches, nausea, neck pain, numbness, phonophobia, photophobia, rhinorrhea, scalp tenderness, seizures, sinus pressure, sore throat, swollen glands, tingling, tinnitus, visual change, vomiting, weakness or weight loss. The symptoms are aggravated by activity. The treatment provided no relief. There is no history of cancer, cluster headaches, hypertension, immunosuppression, migraine headaches, migraines in the family, obesity, pseudotumor cerebri, recent head traumas, sinus disease or TMJ.   Anxiety   Presents for follow-up visit. Symptoms include decreased concentration, excessive worry, irritability, malaise, muscle tension, nervous/anxious behavior and panic. Patient reports no chest pain, compulsions, confusion, depressed mood, dizziness, impotence, insomnia, nausea, obsessions, palpitations, shortness of breath or suicidal ideas. The quality of sleep is good. Nighttime awakenings: none.     Compliance with medications is %. Side effects of treatment include headaches.        The following portions of the patient's history were reviewed and updated as appropriate: allergies, current medications, past social history and problem list.    Review of Systems  "  Constitutional: Positive for irritability. Negative for activity change, appetite change, chills, diaphoresis, fatigue, fever, unexpected weight change and weight loss.   HENT: Negative for congestion, dental problem, drooling, ear pain, hearing loss, rhinorrhea, sinus pressure, sore throat and tinnitus.    Eyes: Negative.  Negative for blurred vision, photophobia, pain, discharge, redness, itching and visual disturbance.   Respiratory: Negative.  Negative for apnea, cough, choking, chest tightness, shortness of breath, wheezing and stridor.    Cardiovascular: Negative.  Negative for chest pain, palpitations and leg swelling.   Gastrointestinal: Negative.  Negative for abdominal pain, anal bleeding, blood in stool, nausea and vomiting.   Endocrine: Negative.  Negative for cold intolerance, heat intolerance, polydipsia, polyphagia and polyuria.   Genitourinary: Negative.  Negative for difficulty urinating, dyspareunia and impotence.   Musculoskeletal: Negative.  Negative for back pain, gait problem, joint swelling, neck pain and neck stiffness.   Skin: Negative.  Negative for color change and pallor.   Allergic/Immunologic: Negative.  Negative for environmental allergies, food allergies and immunocompromised state.   Neurological: Positive for light-headedness and headaches. Negative for dizziness, tingling, tremors, seizures, syncope, facial asymmetry, speech difficulty, weakness, numbness and loss of balance.        HX OF MIGRAINES    Hematological: Negative.  Negative for adenopathy. Does not bruise/bleed easily.   Psychiatric/Behavioral: Positive for decreased concentration and sleep disturbance. Negative for agitation, behavioral problems, confusion, dysphoric mood and suicidal ideas. The patient is nervous/anxious and is hyperactive. The patient does not have insomnia.        Objective   /80   Ht 162.6 cm (64\")   Wt 95.7 kg (211 lb)   BMI 36.22 kg/m²   Physical Exam   Constitutional: She is oriented " to person, place, and time. She appears well-developed and well-nourished. No distress.   HENT:   Head: Normocephalic and atraumatic.   Right Ear: External ear normal.   Left Ear: External ear normal.   Mouth/Throat: Oropharynx is clear and moist. No oropharyngeal exudate.   Eyes: Conjunctivae and EOM are normal. Pupils are equal, round, and reactive to light. Right eye exhibits no discharge. Left eye exhibits no discharge. No scleral icterus.   Neck: Normal range of motion. Neck supple.   Cardiovascular: Normal rate, regular rhythm, normal heart sounds and intact distal pulses. Exam reveals no gallop and no friction rub.   No murmur heard.  Pulmonary/Chest: Effort normal and breath sounds normal. No stridor. No respiratory distress. She has no wheezes. She has no rales. She exhibits no tenderness.   Abdominal: Soft. Bowel sounds are normal. She exhibits no distension and no mass. There is no rebound and no guarding. No hernia.   Musculoskeletal: Normal range of motion. She exhibits no edema or deformity.   Neurological: She is alert and oriented to person, place, and time. She displays normal reflexes. No cranial nerve deficit or sensory deficit. She exhibits normal muscle tone. Coordination normal.   Skin: Skin is warm and dry. No rash noted. She is not diaphoretic. No erythema. No pallor.   Nursing note and vitals reviewed.      Assessment/Plan   Problem List Items Addressed This Visit        Nervous and Auditory    Acute nonintractable headache       Other    Anxiety - Primary    Relevant Orders    Ambulatory Referral to Behavioral Health (Completed)           New Medications Ordered This Visit   Medications   • SUMAtriptan (IMITREX) 50 MG tablet     Sig: Take one tablet at onset of headache. May repeat dose one time in 2 hours if headache not relieved.     Dispense:  9 tablet     Refill:  11   • topiramate (TOPAMAX) 50 MG tablet     Sig: Take 1 tablet by mouth Daily.     Dispense:  30 tablet     Refill:  11   •  busPIRone (BUSPAR) 10 MG tablet     Sig: Take 1 tablet by mouth 3 (Three) Times a Day.     Dispense:  90 tablet     Refill:  5      IMITREX PRN FOR HEADACHES AND ADD TOPAMAX DAILY     It's not just what you eat, but when you eat  Eat breakfast, and eat smaller meals throughout the day. A healthy breakfast can jumpstart your metabolism, while eating small, healthy meals (rather than the standard three large meals) keeps your energy up.   Avoid eating at night. Try to eat dinner earlier and fast for 14-16 hours until breakfast the next morning. Studies suggest that eating only when you’re most active and giving your digestive system a long break each day may help to regulate weight.     Add imitrex prn for headaches-topamax for preventive migraines-buspar for anxiety

## 2019-04-07 ENCOUNTER — HOSPITAL ENCOUNTER (EMERGENCY)
Facility: HOSPITAL | Age: 39
Discharge: HOME | End: 2019-04-07
Attending: FAMILY MEDICINE | Admitting: FAMILY MEDICINE

## 2019-04-07 VITALS
RESPIRATION RATE: 18 BRPM | WEIGHT: 208.8 LBS | SYSTOLIC BLOOD PRESSURE: 109 MMHG | DIASTOLIC BLOOD PRESSURE: 65 MMHG | OXYGEN SATURATION: 96 % | BODY MASS INDEX: 35.65 KG/M2 | HEART RATE: 98 BPM | HEIGHT: 64 IN | TEMPERATURE: 97.7 F

## 2019-04-07 DIAGNOSIS — G43.009 MIGRAINE WITHOUT AURA AND WITHOUT STATUS MIGRAINOSUS, NOT INTRACTABLE: Primary | ICD-10-CM

## 2019-04-07 PROCEDURE — 25010000002 METOCLOPRAMIDE PER 10 MG: Performed by: FAMILY MEDICINE

## 2019-04-07 PROCEDURE — 25010000002 DIPHENHYDRAMINE PER 50 MG: Performed by: FAMILY MEDICINE

## 2019-04-07 PROCEDURE — 99283 EMERGENCY DEPT VISIT LOW MDM: CPT

## 2019-04-07 PROCEDURE — 96375 TX/PRO/DX INJ NEW DRUG ADDON: CPT

## 2019-04-07 PROCEDURE — 96365 THER/PROPH/DIAG IV INF INIT: CPT

## 2019-04-07 RX ORDER — DIPHENHYDRAMINE HYDROCHLORIDE 50 MG/ML
25 INJECTION INTRAMUSCULAR; INTRAVENOUS ONCE
Status: COMPLETED | OUTPATIENT
Start: 2019-04-07 | End: 2019-04-07

## 2019-04-07 RX ORDER — SODIUM CHLORIDE 0.9 % (FLUSH) 0.9 %
10 SYRINGE (ML) INJECTION AS NEEDED
Status: DISCONTINUED | OUTPATIENT
Start: 2019-04-07 | End: 2019-04-07 | Stop reason: HOSPADM

## 2019-04-07 RX ADMIN — DIPHENHYDRAMINE HYDROCHLORIDE 25 MG: 50 INJECTION INTRAMUSCULAR; INTRAVENOUS at 05:19

## 2019-04-07 RX ADMIN — SODIUM CHLORIDE 1000 ML: 9 INJECTION, SOLUTION INTRAVENOUS at 05:17

## 2019-04-07 RX ADMIN — METOCLOPRAMIDE 20 MG: 5 INJECTION, SOLUTION INTRAMUSCULAR; INTRAVENOUS at 05:22

## 2019-04-07 NOTE — ED NOTES
Offered apologies for wait time. Offered to go ahead and start IV and draw labs to send in preparation for MD. Pt elects to wait until evaluated by MD first.     Bre Higgins, RN  04/07/19 0330

## 2019-04-07 NOTE — ED NOTES
Pt requests to be transferred to a different room due to business of ED. Explained ED is full at this time, but would check with charge nurse for alternate placement when available.     Bre Higgins, RN  04/07/19 020

## 2019-04-07 NOTE — ED NOTES
Pt sleeping on her stomach. Respirations even/unlabored. Pt unaware this RN in room.     Bre Higgins, RN  04/07/19 [id number]

## 2019-04-07 NOTE — ED PROVIDER NOTES
Subjective   38-year-old white female presents the emergency department with a complaint of migraine headache.  She states that this is her typical migraine headache but is worse and has lasted longer.  Positive photophobia and phonophobia.  She is nauseated and has vomited a couple of times.            Review of Systems   Constitutional: Negative for activity change, appetite change, chills, fatigue, fever and unexpected weight change.   HENT: Negative for nosebleeds, rhinorrhea, sore throat, trouble swallowing and voice change.    Eyes: Negative for photophobia, pain and visual disturbance.   Respiratory: Negative for apnea, cough, chest tightness, shortness of breath, wheezing and stridor.    Cardiovascular: Negative for chest pain, palpitations and leg swelling.   Gastrointestinal: Negative for abdominal distention, abdominal pain, blood in stool, constipation, diarrhea, nausea and vomiting.   Endocrine: Negative for cold intolerance, heat intolerance, polydipsia and polyuria.   Genitourinary: Negative for decreased urine volume, difficulty urinating, dysuria, flank pain, hematuria and urgency.   Musculoskeletal: Negative for arthralgias, myalgias, neck pain and neck stiffness.   Skin: Negative for color change, pallor and rash.   Allergic/Immunologic: Negative for immunocompromised state.   Neurological: Positive for headaches. Negative for dizziness, seizures, syncope, weakness, light-headedness and numbness.   Hematological: Negative for adenopathy.   Psychiatric/Behavioral: Negative for agitation, confusion, dysphoric mood and suicidal ideas. The patient is not nervous/anxious.        Past Medical History:   Diagnosis Date   • Abdominal pain    • Acute bronchitis    • Acute upper respiratory infection    • Anxiety state    • Backache    • Bell's palsy    • Cellulitis      left breast      • Chest wall pain     right lateral chest wall-poss early shingles      • Chest wall pain 10/21/2015    right lateral  "chest wall-poss early shingles      • Chronic low back pain     May 13 2014 MRI ddd with mild left neuroforminal narrowing l4-l5      • Chronic retention of urine     ACUTE   • Cough    • Depressive disorder    • Dysuria    • Facial hemiparesis (CMS/HCC)    • Facial hemiparesis (CMS/HCC)    • Fatigue    • Fatigue    • Fever    • Ganglion cyst     LEFT BREAST   • Generalized anxiety disorder    • Headache    • Insomnia    • Migraine    • Pain and other symptoms associated with female genital organs    • Pain radiating to back     LUMBAR   • Postnasal drip    • Retention of urine    • Skin sensation disturbance    • Urinary incontinence    • Urinary tract infectious disease    • Viral gastroenteritis    • Vulvovaginitis        Allergies   Allergen Reactions   • Compazine [Prochlorperazine Edisylate] Anxiety     \"coming out of my skin\"       Past Surgical History:   Procedure Laterality Date   • ANKLE OPEN REDUCTION INTERNAL FIXATION Left 2017    Procedure: ANKLE OPEN REDUCTION INTERNAL FIXATION;  Surgeon: Leo Cisneros MD;  Location: [facility];  Service:    • APPENDECTOMY     •  SECTION     • CHOLECYSTECTOMY     • INJECTION OF MEDICATION  2016    KENALOG(4)   • INJECTION OF MEDICATION  2011    PHENERGAN(1)   • INJECTION OF MEDICATION  2015    ROCEPHIN(1)   • INJECTION OF MEDICATION  2015    TORADOL(2)       Family History   Problem Relation Age of Onset   • Alzheimer's disease Other    • ADD / ADHD Other    • Depression Other    • Diabetes Other    • Migraines Other        Social History     Socioeconomic History   • Marital status:      Spouse name: Not on file   • Number of children: Not on file   • Years of education: Not on file   • Highest education level: Not on file   Tobacco Use   • Smoking status: Current Every Day Smoker     Packs/day: 1.00     Types: Cigarettes   • Smokeless tobacco: Never Used   Substance and Sexual Activity   • Alcohol use: Yes     " Comment: occ   • Drug use: No   • Sexual activity: Defer           Objective   Physical Exam   Constitutional: She is oriented to person, place, and time. She appears well-developed and well-nourished. No distress.   HENT:   Head: Normocephalic and atraumatic.   Right Ear: External ear normal.   Left Ear: External ear normal.   Mouth/Throat: Oropharynx is clear and moist. No oropharyngeal exudate.   Eyes: Conjunctivae and EOM are normal. Pupils are equal, round, and reactive to light. Right eye exhibits no discharge. Left eye exhibits no discharge. No scleral icterus.   Neck: Neck supple. No JVD present. No tracheal deviation present. No thyromegaly present.   Cardiovascular: Normal rate, regular rhythm, normal heart sounds and intact distal pulses. Exam reveals no friction rub.   No murmur heard.  Pulmonary/Chest: Effort normal and breath sounds normal. No stridor. No respiratory distress. She has no wheezes. She has no rales. She exhibits no tenderness.   Abdominal: Soft. Bowel sounds are normal. She exhibits no distension and no mass. There is no tenderness. There is no rebound and no guarding.   Musculoskeletal: She exhibits no edema, tenderness or deformity.   Lymphadenopathy:     She has no cervical adenopathy.   Neurological: She is alert and oriented to person, place, and time. No cranial nerve deficit. She exhibits normal muscle tone. Coordination normal.   Skin: Skin is warm and dry. Capillary refill takes 2 to 3 seconds. No rash noted. She is not diaphoretic. No erythema.   Psychiatric: She has a normal mood and affect. Her behavior is normal. Judgment and thought content normal.   Nursing note and vitals reviewed.      Procedures           ED Course  ED Course as of Apr 07 0629   Sun Apr 07, 2019 0628 Patient was placed in room 1 and evaluated by me.  Physical exam was normal.  She was offered a sphenopalatine block versus IV fluids and medications for her headache.  She opted for IV medications and  was given Benadryl, Reglan, and IV fluids.  On reevaluation, the patient was resting comfortably in bed.  At this point I believe she is medically stable for discharge and will follow up with her primary care provider as an outpatient.  [CE]      ED Course User Index  [CE] Dwight Mathias DO        Labs Reviewed - No data to display  No results found.          MDM      Final diagnoses:   Migraine without aura and without status migrainosus, not intractable            Dwight Mathias DO  04/07/19 0629

## 2019-04-12 ENCOUNTER — TELEPHONE (OUTPATIENT)
Dept: FAMILY MEDICINE CLINIC | Facility: CLINIC | Age: 39
End: 2019-04-12

## 2019-04-12 DIAGNOSIS — G43.101 MIGRAINE WITH AURA AND WITH STATUS MIGRAINOSUS, NOT INTRACTABLE: Primary | ICD-10-CM

## 2019-04-12 RX ORDER — TOPIRAMATE 100 MG/1
100 TABLET, FILM COATED ORAL DAILY
Qty: 30 TABLET | Refills: 11 | Status: SHIPPED | OUTPATIENT
Start: 2019-04-12 | End: 2019-04-25

## 2019-04-12 NOTE — TELEPHONE ENCOUNTER
She called and said is still having probs with migraines-wants to know what Aileen recommends-if she needs some tests done -increase her meds or does she need to see her again first.She can be reached at [phone]-said can be reached later this afternoon-can leave voice mail.

## 2019-04-15 ENCOUNTER — TELEPHONE (OUTPATIENT)
Dept: FAMILY MEDICINE CLINIC | Facility: CLINIC | Age: 39
End: 2019-04-15

## 2019-04-15 NOTE — TELEPHONE ENCOUNTER
Patient called and said her migraines have gotten worse. She states that she would like to talk to Aileen about changing from FMLA to short term disability. Call her at [phone]

## 2019-04-24 ENCOUNTER — TELEPHONE (OUTPATIENT)
Dept: FAMILY MEDICINE CLINIC | Facility: CLINIC | Age: 39
End: 2019-04-24

## 2019-04-24 DIAGNOSIS — G43.011 INTRACTABLE MIGRAINE WITHOUT AURA AND WITH STATUS MIGRAINOSUS: Primary | ICD-10-CM

## 2019-04-24 NOTE — TELEPHONE ENCOUNTER
We probably need to get her in to order an mri of her brain-see when neuro will be seeing her-let Mily know we are taking care of it.

## 2019-04-24 NOTE — TELEPHONE ENCOUNTER
Mily is on her way in from [place], said Chris's  told her that Mily wanted to visit her grandmother that has been  for 8 years and she told her  that he cannot go out because there are spies everywhere.  She asked if you could call her, does not know what to do.  Said there is history of mental illness in the family and with her having headaches a lot she is concerned something is going on.    Mily [phone]

## 2019-04-25 ENCOUNTER — HOSPITAL ENCOUNTER (OUTPATIENT)
Dept: MRI IMAGING | Facility: HOSPITAL | Age: 39
Discharge: HOME | End: 2019-04-25
Admitting: NURSE PRACTITIONER

## 2019-04-25 ENCOUNTER — OFFICE VISIT (OUTPATIENT)
Dept: FAMILY MEDICINE CLINIC | Facility: CLINIC | Age: 39
End: 2019-04-25

## 2019-04-25 VITALS
DIASTOLIC BLOOD PRESSURE: 80 MMHG | BODY MASS INDEX: 33.8 KG/M2 | HEIGHT: 64 IN | HEART RATE: 142 BPM | SYSTOLIC BLOOD PRESSURE: 108 MMHG | WEIGHT: 198 LBS

## 2019-04-25 DIAGNOSIS — R51.9 ACUTE NONINTRACTABLE HEADACHE, UNSPECIFIED HEADACHE TYPE: Primary | ICD-10-CM

## 2019-04-25 DIAGNOSIS — R41.3 MEMORY LOSS: ICD-10-CM

## 2019-04-25 DIAGNOSIS — F32.A DEPRESSIVE DISORDER: ICD-10-CM

## 2019-04-25 PROCEDURE — 70551 MRI BRAIN STEM W/O DYE: CPT

## 2019-04-25 PROCEDURE — 99214 OFFICE O/P EST MOD 30 MIN: CPT | Performed by: NURSE PRACTITIONER

## 2019-04-25 RX ORDER — HYDROMORPHONE HYDROCHLORIDE 4 MG/1
4 TABLET ORAL EVERY 4 HOURS PRN
COMMUNITY
End: 2019-05-24

## 2019-04-25 NOTE — PROGRESS NOTES
Chief Complaint   Patient presents with   • Migraine     had MRI this morning     Subjective   Chris Valdez is a 38 y.o. female.     Headache    This is a recurrent problem. The current episode started 1 to 4 weeks ago. The problem has been gradually worsening. The pain is located in the bilateral region. The pain quality is similar to prior headaches. The quality of the pain is described as band-like. The pain is at a severity of 6/10. The pain is severe. Associated symptoms include back pain. Pertinent negatives include no abdominal pain, abnormal behavior, anorexia, blurred vision, coughing, dizziness, drainage, ear pain, eye pain, eye redness, eye watering, facial sweating, fever, hearing loss, insomnia, loss of balance, muscle aches, nausea, neck pain, numbness, phonophobia, photophobia, rhinorrhea, scalp tenderness, seizures, sinus pressure, sore throat, swollen glands, tingling, tinnitus, visual change, vomiting, weakness or weight loss. The symptoms are aggravated by activity. The treatment provided no relief. There is no history of cancer, cluster headaches, hypertension, immunosuppression, migraine headaches, migraines in the family, obesity, pseudotumor cerebri, recent head traumas, sinus disease or TMJ.   Anxiety   Presents for follow-up visit. Symptoms include decreased concentration, excessive worry, irritability, malaise, muscle tension, nervous/anxious behavior and panic. Patient reports no chest pain, compulsions, confusion, depressed mood, dizziness, impotence, insomnia, nausea, obsessions, palpitations, shortness of breath or suicidal ideas. The quality of sleep is good. Nighttime awakenings: none.     Compliance with medications is %. Side effects of treatment include headaches.   Memory Loss   This is a new problem. The current episode started in the past 7 days. The problem occurs daily. The problem has been gradually worsening. Associated symptoms include arthralgias, headaches,  joint swelling, myalgias and vertigo. Pertinent negatives include no abdominal pain, anorexia, change in bowel habit, chest pain, chills, congestion, coughing, diaphoresis, fatigue, fever, nausea, neck pain, numbness, rash, sore throat, swollen glands, urinary symptoms, visual change, vomiting or weakness. She has tried rest and relaxation for the symptoms. The treatment provided no relief.        The following portions of the patient's history were reviewed and updated as appropriate: allergies, current medications, past social history and problem list.    Review of Systems   Constitutional: Positive for irritability. Negative for activity change, appetite change, chills, diaphoresis, fatigue, fever, unexpected weight change and weight loss.   HENT: Negative for congestion, dental problem, drooling, ear discharge, ear pain, hearing loss, rhinorrhea, sinus pressure, sore throat and tinnitus.    Eyes: Negative.  Negative for blurred vision, photophobia, pain, discharge, redness, itching and visual disturbance.   Respiratory: Negative.  Negative for apnea, cough, choking, chest tightness, shortness of breath, wheezing and stridor.    Cardiovascular: Negative.  Negative for chest pain, palpitations and leg swelling.   Gastrointestinal: Negative.  Negative for abdominal pain, anal bleeding, anorexia, blood in stool, change in bowel habit, nausea and vomiting.   Endocrine: Negative.  Negative for cold intolerance, heat intolerance, polydipsia, polyphagia and polyuria.   Genitourinary: Negative.  Negative for difficulty urinating, dyspareunia, dysuria, enuresis, flank pain and impotence.   Musculoskeletal: Positive for arthralgias, back pain, gait problem, joint swelling and myalgias. Negative for neck pain and neck stiffness.   Skin: Negative.  Negative for color change, pallor and rash.   Allergic/Immunologic: Negative.  Negative for environmental allergies, food allergies and immunocompromised state.   Neurological:  "Positive for vertigo, light-headedness and headaches. Negative for dizziness, tingling, tremors, seizures, syncope, facial asymmetry, speech difficulty, weakness, numbness and loss of balance.        HX OF MIGRAINES worsening-now having hallucinations, talking to people are not there,    Hematological: Negative.  Negative for adenopathy. Does not bruise/bleed easily.   Psychiatric/Behavioral: Positive for decreased concentration and sleep disturbance. Negative for agitation, behavioral problems, confusion, dysphoric mood, hallucinations, self-injury and suicidal ideas. The patient is nervous/anxious and is hyperactive. The patient does not have insomnia.        Objective   /80   Pulse (!) 142   Ht 162.6 cm (64\")   Wt 89.8 kg (198 lb)   BMI 33.99 kg/m²   Physical Exam   Constitutional: She is oriented to person, place, and time. She appears well-developed and well-nourished. No distress.   HENT:   Head: Normocephalic and atraumatic.   Right Ear: External ear normal.   Left Ear: External ear normal.   Mouth/Throat: Oropharynx is clear and moist. No oropharyngeal exudate.   Eyes: Conjunctivae and EOM are normal. Pupils are equal, round, and reactive to light. Right eye exhibits no discharge. Left eye exhibits no discharge. No scleral icterus.   Neck: Normal range of motion. Neck supple. No JVD present. No tracheal deviation present. No thyromegaly present.   Cardiovascular: Normal rate, regular rhythm, normal heart sounds and intact distal pulses. Exam reveals no gallop and no friction rub.   No murmur heard.  Pulmonary/Chest: Effort normal and breath sounds normal. No stridor. No respiratory distress. She has no wheezes. She has no rales. She exhibits no tenderness.   Abdominal: Soft. Bowel sounds are normal. She exhibits no distension and no mass. There is no tenderness. There is no rebound and no guarding. No hernia.   Musculoskeletal: Normal range of motion. She exhibits tenderness. She exhibits no edema " or deformity.        Cervical back: She exhibits pain and spasm.        Back:    Followed by pain management for chronic neck and low back pain    Lymphadenopathy:     She has no cervical adenopathy.   Neurological: She is alert and oriented to person, place, and time. She displays normal reflexes. No cranial nerve deficit or sensory deficit. She exhibits normal muscle tone. Coordination normal.   Skin: Skin is warm and dry. No rash noted. She is not diaphoretic. No erythema. No pallor.   Nursing note and vitals reviewed.      Assessment/Plan   Problem List Items Addressed This Visit        Nervous and Auditory    Acute nonintractable headache - Primary       Other    Depressive disorder    Relevant Orders    Ambulatory Referral to Behavioral Health (Completed)    Memory loss         No orders of the defined types were placed in this encounter.      Stress relief discussed in length. Consider therapy, suggest yoga, exercise, meditation -patient is agrees-will call back if worsen for referral     Patient has not been taking buspar-will work off weaning off the topamax and see if this helps symptoms     MRI Brain Without Contrast   Order: [id number]   Status:  Final result   Visible to patient:  No (Not Released) Dx:  Intractable migraine without aura and...   Details     Reading Physician Reading Date Result Priority   Ilia Solitario MD 4/25/2019       Narrative     PROCEDURE: MRI BRAIN WO CONTRAST    Clinical History: Migraine, G43.011 Migraine without aura,  intractable, with status migrainosus    Indications: Same as above    Comparison: None    Technique:     Noncontrast MRI of the brain was done in a multiplanar and  multi-sequence format.    Findings:     There is no evidence of acute focal infarcts, midline shift, mass  effect or intracranial hemorrhage.    There is no visualization of a demyelination process. There is no  mesial temporal sclerosis.    There are no extra-axial fluid collections.    The   meninges appear unremarkable, given the limitation of lack  of intravenous contrast    There is good gray/white matter differentiation.    There is normal flow-void in the intracranial vasculature.    The ventricular system is normal. A tiny benign choroid fissure  cyst is seen in the medial right temporal lobe    The craniovertebral junction appears grossly unremarkable.    The mastoid air cells are unremarkable .    The paranasal sinuses show a small retention cyst and mild  mucoperiosteal thickening in the sphenoid sinus .     Limited evaluation of the bilateral orbits, pituitary fossa  region and the posterior fossa region including the  cerebellopontine angles do not show any gross abnormality.      Impression     Impression:     There are no acute or significant intracranial findings on the  noncontrast MRI of the brain .    Electronically signed by:  Ilia Solitario MD  4/25/2019 11:26 AM  CDT Workstation: [id number]            Specimen Collected: 04/25/19 09:28 Last Resulted: 04/25/19 11:26        Order Details       View Encounter       Lab and Collection Details       Routing       Result History               Routing History     Priority Sent On From To Message Type    4/25/2019 11:27 AM Interface, Rad Results [place] [facility] Aileen Braden, [place]           Mri brain is normal-we will have a mental health evaluation asap-neuro appointment is in June and hoping to get this sooner than later-stop buspar, wean off the topomax due to worsening headaches and symptoms she is having-off work for 2 weeks at this time-patient notified of this result and agrees to plan of action     Patient instructed if symptoms worsen then I would suggest going to the hospital for mental health evaluation and direct admission for evaluation-pt aware and will consider doing this

## 2019-05-08 RX ORDER — METRONIDAZOLE 500 MG/1
500 TABLET ORAL 2 TIMES DAILY
Qty: 14 TABLET | Refills: 0 | Status: SHIPPED | OUTPATIENT
Start: 2019-05-08 | End: 2019-05-15

## 2019-05-13 ENCOUNTER — OFFICE VISIT (OUTPATIENT)
Dept: FAMILY MEDICINE CLINIC | Facility: CLINIC | Age: 39
End: 2019-05-13

## 2019-05-13 VITALS
HEIGHT: 64 IN | BODY MASS INDEX: 31.92 KG/M2 | DIASTOLIC BLOOD PRESSURE: 70 MMHG | WEIGHT: 187 LBS | SYSTOLIC BLOOD PRESSURE: 102 MMHG

## 2019-05-13 DIAGNOSIS — F25.9 SCHIZOAFFECTIVE DISORDER, UNSPECIFIED TYPE (HCC): Primary | ICD-10-CM

## 2019-05-13 DIAGNOSIS — F41.9 ANXIETY: ICD-10-CM

## 2019-05-13 PROCEDURE — 99213 OFFICE O/P EST LOW 20 MIN: CPT | Performed by: NURSE PRACTITIONER

## 2019-05-13 RX ORDER — TOPIRAMATE 100 MG/1
200 TABLET, FILM COATED ORAL DAILY
Refills: 1 | COMMUNITY
Start: 2019-04-27 | End: 2019-05-24

## 2019-05-13 RX ORDER — BUPROPION HYDROCHLORIDE 150 MG/1
150 TABLET, EXTENDED RELEASE ORAL 2 TIMES DAILY
COMMUNITY
End: 2019-05-24

## 2019-05-13 RX ORDER — ARIPIPRAZOLE 2 MG/1
2 TABLET ORAL DAILY
Refills: 1 | COMMUNITY
Start: 2019-04-27 | End: 2019-05-24

## 2019-05-13 NOTE — PROGRESS NOTES
Chief Complaint   Patient presents with   • Anxiety     follow up after treatment     Subjective   Chris Valdez is a 38 y.o. female.     Presents with recheck of anxiety and depression -[facility] 2 week hospital stay [facility] and then [facility]- with Schizophrenia       Anxiety   Presents for follow-up visit. Symptoms include decreased concentration, depressed mood, excessive worry, insomnia, irritability, nervous/anxious behavior, obsessions, palpitations and panic. Patient reports no chest pain, compulsions, confusion, dizziness, feeling of choking, hyperventilation, impotence, malaise, muscle tension, nausea, restlessness, shortness of breath or suicidal ideas. Symptoms occur most days. The quality of sleep is good. Nighttime awakenings: none.     Compliance with medications is %.        The following portions of the patient's history were reviewed and updated as appropriate: allergies, current medications, past social history and problem list.    Review of Systems   Constitutional: Positive for irritability. Negative for activity change, appetite change, chills, diaphoresis, fatigue, fever and unexpected weight change.   HENT: Negative for congestion, dental problem, drooling, ear pain, hearing loss, mouth sores, rhinorrhea, sinus pressure, sinus pain, sneezing, sore throat and tinnitus.    Eyes: Negative.  Negative for photophobia, pain, discharge, redness, itching and visual disturbance.   Respiratory: Negative.  Negative for apnea, cough, choking, chest tightness, shortness of breath, wheezing and stridor.    Cardiovascular: Positive for palpitations. Negative for chest pain and leg swelling.   Gastrointestinal: Negative.  Negative for abdominal distention, abdominal pain, anal bleeding, blood in stool, constipation, diarrhea, nausea and vomiting.   Endocrine: Negative.  Negative for cold intolerance, heat intolerance, polydipsia, polyphagia and polyuria.   Genitourinary: Negative.   "Negative for difficulty urinating, dyspareunia and impotence.   Musculoskeletal: Negative.  Negative for arthralgias, back pain, gait problem, joint swelling, myalgias, neck pain and neck stiffness.   Skin: Negative.  Negative for color change and pallor.   Allergic/Immunologic: Negative.  Negative for environmental allergies, food allergies and immunocompromised state.   Neurological: Positive for light-headedness and headaches. Negative for dizziness, tremors, seizures, syncope, facial asymmetry, speech difficulty, weakness and numbness.        HX OF MIGRAINES    Hematological: Negative.  Negative for adenopathy. Does not bruise/bleed easily.   Psychiatric/Behavioral: Positive for agitation, decreased concentration and sleep disturbance. Negative for behavioral problems, confusion, dysphoric mood, hallucinations, self-injury and suicidal ideas. The patient is nervous/anxious, has insomnia and is hyperactive.        Objective   /70   Ht 162.6 cm (64\")   Wt 84.8 kg (187 lb)   BMI 32.10 kg/m²   Physical Exam   Constitutional: She is oriented to person, place, and time. She appears well-developed and well-nourished. No distress.   HENT:   Head: Normocephalic and atraumatic.   Right Ear: External ear normal.   Left Ear: External ear normal.   Nose: Nose normal.   Mouth/Throat: Oropharynx is clear and moist. No oropharyngeal exudate.   Eyes: Conjunctivae and EOM are normal. Pupils are equal, round, and reactive to light. Right eye exhibits no discharge. Left eye exhibits no discharge. No scleral icterus.   Neck: Normal range of motion. Neck supple. No JVD present. No tracheal deviation present. No thyromegaly present.   Cardiovascular: Normal rate, regular rhythm, normal heart sounds and intact distal pulses. Exam reveals no gallop and no friction rub.   No murmur heard.  Pulmonary/Chest: Effort normal and breath sounds normal. No stridor. No respiratory distress. She has no wheezes. She has no rales. She " exhibits no tenderness.   Abdominal: Soft. Bowel sounds are normal. She exhibits no distension and no mass. There is no tenderness. There is no rebound and no guarding. No hernia.   Musculoskeletal: Normal range of motion. She exhibits no edema, tenderness or deformity.   Lymphadenopathy:     She has no cervical adenopathy.   Neurological: She is alert and oriented to person, place, and time. She displays normal reflexes. No cranial nerve deficit or sensory deficit. She exhibits normal muscle tone. Coordination normal.   Skin: Skin is warm and dry. No rash noted. She is not diaphoretic. No erythema. No pallor.   Nursing note and vitals reviewed.      Assessment/Plan   Problem List Items Addressed This Visit        Other    Anxiety    Schizoaffective disorder (CMS/HCC) - Primary    Relevant Medications    ARIPiprazole (ABILIFY) 2 MG tablet    buPROPion SR (WELLBUTRIN SR) 150 MG 12 hr tablet         No orders of the defined types were placed in this encounter.     continue to wean off dilaudid-will be seeing mental health today at 2pm-[facility]. tre agrees to follow up as directed

## 2019-08-15 ENCOUNTER — OFFICE VISIT (OUTPATIENT)
Dept: ORTHOPEDIC SURGERY | Facility: CLINIC | Age: 39
End: 2019-08-15

## 2019-08-15 VITALS — HEIGHT: 64 IN | BODY MASS INDEX: 34.31 KG/M2 | WEIGHT: 201 LBS

## 2019-08-15 DIAGNOSIS — M19.172 POST-TRAUMATIC ARTHRITIS OF LEFT ANKLE: Primary | ICD-10-CM

## 2019-08-15 PROCEDURE — 20605 DRAIN/INJ JOINT/BURSA W/O US: CPT | Performed by: NURSE PRACTITIONER

## 2019-08-15 PROCEDURE — 99214 OFFICE O/P EST MOD 30 MIN: CPT | Performed by: NURSE PRACTITIONER

## 2019-08-15 RX ORDER — TRIAMCINOLONE ACETONIDE 40 MG/ML
40 INJECTION, SUSPENSION INTRA-ARTICULAR; INTRAMUSCULAR
Status: COMPLETED | OUTPATIENT
Start: 2019-08-15 | End: 2019-08-15

## 2019-08-15 RX ORDER — LIDOCAINE HYDROCHLORIDE 10 MG/ML
2 INJECTION, SOLUTION EPIDURAL; INFILTRATION; INTRACAUDAL; PERINEURAL
Status: COMPLETED | OUTPATIENT
Start: 2019-08-15 | End: 2019-08-15

## 2019-08-15 RX ADMIN — LIDOCAINE HYDROCHLORIDE 2 ML: 10 INJECTION, SOLUTION EPIDURAL; INFILTRATION; INTRACAUDAL; PERINEURAL at 14:22

## 2019-08-15 RX ADMIN — TRIAMCINOLONE ACETONIDE 40 MG: 40 INJECTION, SUSPENSION INTRA-ARTICULAR; INTRAMUSCULAR at 14:22

## 2019-08-15 NOTE — PROGRESS NOTES
"Chris Valdez is a 39 y.o. female returns for     Chief Complaint   Patient presents with   • Left Ankle - Follow-up, Pain       HISTORY OF PRESENT ILLNESS: 39-year-old  female who is a  over the hospitals in the office today for follow-up evaluation of left ankle pain.  She reports worsening symptoms over the past several weeks and is requesting a repeat an intra-articular injection of steroids which improved her pain for approximately 2 months.  She continues to modify her activity and take pain medication for pain relief as needed as well.       CONCURRENT MEDICAL HISTORY:    The following portions of the patient's history were reviewed and updated as appropriate: allergies, current medications, past family history, past medical history, past social history, past surgical history and problem list.     ROS  No fevers or chills.  No chest pain or shortness of air.  No GI or  disturbances.    PHYSICAL EXAMINATION:       Ht 162.6 cm (64\")   Wt 91.2 kg (201 lb)   BMI 34.50 kg/m²     Physical Exam   Constitutional: She is oriented to person, place, and time. Vital signs are normal. She appears well-developed and well-nourished. She is cooperative.   HENT:   Head: Normocephalic and atraumatic.   Neck: Trachea normal and phonation normal.   Pulmonary/Chest: Effort normal. No respiratory distress.   Abdominal: Soft. Normal appearance. She exhibits no distension.   Neurological: She is alert and oriented to person, place, and time. GCS eye subscore is 4. GCS verbal subscore is 5. GCS motor subscore is 6.   Skin: Skin is warm, dry and intact. Capillary refill takes less than 2 seconds.   Psychiatric: She has a normal mood and affect. Her speech is normal and behavior is normal. Judgment and thought content normal. Cognition and memory are normal.   Vitals reviewed.      GAIT:     []  Normal  [x]  Antalgic    Assistive device: []  None  []  Walker     []  Crutches  []  Cane     []  " Wheelchair  []  Stretcher    Right Ankle Exam   Right ankle exam is normal.      Left Ankle Exam     Tenderness   Left ankle tenderness location: Diffuse.   Swelling: mild    Range of Motion   Dorsiflexion: abnormal   Plantar flexion: abnormal   Eversion: abnormal   Inversion: abnormal     Muscle Strength   Dorsiflexion:  4/5   Plantar flexion:  4/5     Other   Erythema: absent  Scars: present  Sensation: normal  Pulse: present              No results found.    Medium Joint Arthrocentesis: L ankle  Date/Time: 8/15/2019 2:22 PM  Consent given by: parent  Supporting Documentation  Indications: pain   Procedure Details  Location: ankle - L ankle  Preparation: Patient was prepped and draped in the usual sterile fashion  Needle size: 22 G  Approach: anterolateral  Medications administered: 2 mL lidocaine PF 1% 1 %; 40 mg triamcinolone acetonide 40 MG/ML  Patient tolerance: patient tolerated the procedure well with no immediate complications              ASSESSMENT:    Diagnoses and all orders for this visit:    Post-traumatic arthritis of left ankle  -     Medium Joint Arthrocentesis: L ankle          PLAN  Repeated intra-articular injection of steroids today and recommended progression of range of motion activity as tolerated and follow-up as needed.  No Follow-up on file.    Rhett Butler, APRN

## 2019-08-20 ENCOUNTER — HOSPITAL ENCOUNTER (EMERGENCY)
Facility: HOSPITAL | Age: 39
Discharge: PSYCHIATRIC OR SUBSTANCE USE FACILITY | End: 2019-08-20
Attending: FAMILY MEDICINE | Admitting: FAMILY MEDICINE

## 2019-08-20 ENCOUNTER — HOSPITAL ENCOUNTER (INPATIENT)
Facility: HOSPITAL | Age: 39
LOS: 4 days | Discharge: HOME | End: 2019-08-24
Attending: PSYCHIATRY & NEUROLOGY | Admitting: PSYCHIATRY & NEUROLOGY

## 2019-08-20 VITALS
SYSTOLIC BLOOD PRESSURE: 115 MMHG | HEART RATE: 89 BPM | HEIGHT: 64 IN | DIASTOLIC BLOOD PRESSURE: 82 MMHG | OXYGEN SATURATION: 95 % | WEIGHT: 190 LBS | RESPIRATION RATE: 18 BRPM | TEMPERATURE: 98.5 F | BODY MASS INDEX: 32.44 KG/M2

## 2019-08-20 DIAGNOSIS — F30.9 MANIA (HCC): Primary | ICD-10-CM

## 2019-08-20 DIAGNOSIS — F29 PSYCHOSIS NOT DUE TO SUBSTANCE OR KNOWN PHYSIOLOGICAL CONDITION (HCC): ICD-10-CM

## 2019-08-20 PROBLEM — F31.2 MANIC DISORDER, RECURRENT EPISODE, SEVERE, WITH PSYCHOTIC BEHAVIOR (HCC): Status: ACTIVE | Noted: 2019-08-20

## 2019-08-20 LAB
ALBUMIN SERPL-MCNC: 4.4 G/DL (ref 3.5–5.2)
ALBUMIN/GLOB SERPL: 1.2 G/DL
ALP SERPL-CCNC: 57 U/L (ref 39–117)
ALT SERPL W P-5'-P-CCNC: 22 U/L (ref 1–33)
AMPHET+METHAMPHET UR QL: NEGATIVE
AMPHETAMINES UR QL: NEGATIVE
ANION GAP SERPL CALCULATED.3IONS-SCNC: 13 MMOL/L (ref 5–15)
APAP SERPL-MCNC: <5 MCG/ML (ref 10–30)
AST SERPL-CCNC: 16 U/L (ref 1–32)
BACTERIA UR QL AUTO: ABNORMAL /HPF
BARBITURATES UR QL SCN: NEGATIVE
BASOPHILS # BLD AUTO: 0.02 10*3/MM3 (ref 0–0.2)
BASOPHILS NFR BLD AUTO: 0.2 % (ref 0–1.5)
BENZODIAZ UR QL SCN: POSITIVE
BILIRUB SERPL-MCNC: 0.2 MG/DL (ref 0.2–1.2)
BILIRUB UR QL STRIP: ABNORMAL
BUN BLD-MCNC: 13 MG/DL (ref 6–20)
BUN/CREAT SERPL: 17.1 (ref 7–25)
BUPRENORPHINE SERPL-MCNC: NEGATIVE NG/ML
CALCIUM SPEC-SCNC: 9.6 MG/DL (ref 8.6–10.5)
CANNABINOIDS SERPL QL: POSITIVE
CHLORIDE SERPL-SCNC: 109 MMOL/L (ref 98–107)
CLARITY UR: ABNORMAL
CO2 SERPL-SCNC: 20 MMOL/L (ref 22–29)
COCAINE UR QL: NEGATIVE
COLOR UR: ABNORMAL
CREAT BLD-MCNC: 0.76 MG/DL (ref 0.57–1)
DEPRECATED RDW RBC AUTO: 43.8 FL (ref 37–54)
EOSINOPHIL # BLD AUTO: 0.03 10*3/MM3 (ref 0–0.4)
EOSINOPHIL NFR BLD AUTO: 0.3 % (ref 0.3–6.2)
ERYTHROCYTE [DISTWIDTH] IN BLOOD BY AUTOMATED COUNT: 13 % (ref 12.3–15.4)
ETHANOL BLD-MCNC: <10 MG/DL (ref 0–10)
ETHANOL UR QL: <0.01 %
GFR SERPL CREATININE-BSD FRML MDRD: 86 ML/MIN/1.73
GLOBULIN UR ELPH-MCNC: 3.7 GM/DL
GLUCOSE BLD-MCNC: 85 MG/DL (ref 65–99)
GLUCOSE UR STRIP-MCNC: NEGATIVE MG/DL
HCG SERPL QL: NEGATIVE
HCT VFR BLD AUTO: 44.5 % (ref 34–46.6)
HGB BLD-MCNC: 14.9 G/DL (ref 12–15.9)
HGB UR QL STRIP.AUTO: ABNORMAL
HOLD SPECIMEN: NORMAL
HOLD SPECIMEN: NORMAL
HYALINE CASTS UR QL AUTO: ABNORMAL /LPF
IMM GRANULOCYTES # BLD AUTO: 0.02 10*3/MM3 (ref 0–0.05)
IMM GRANULOCYTES NFR BLD AUTO: 0.2 % (ref 0–0.5)
KETONES UR QL STRIP: ABNORMAL
LEUKOCYTE ESTERASE UR QL STRIP.AUTO: ABNORMAL
LYMPHOCYTES # BLD AUTO: 2.5 10*3/MM3 (ref 0.7–3.1)
LYMPHOCYTES NFR BLD AUTO: 28.1 % (ref 19.6–45.3)
MCH RBC QN AUTO: 30.5 PG (ref 26.6–33)
MCHC RBC AUTO-ENTMCNC: 33.5 G/DL (ref 31.5–35.7)
MCV RBC AUTO: 91 FL (ref 79–97)
METHADONE UR QL SCN: NEGATIVE
MONOCYTES # BLD AUTO: 0.47 10*3/MM3 (ref 0.1–0.9)
MONOCYTES NFR BLD AUTO: 5.3 % (ref 5–12)
NEUTROPHILS # BLD AUTO: 5.86 10*3/MM3 (ref 1.7–7)
NEUTROPHILS NFR BLD AUTO: 65.9 % (ref 42.7–76)
NITRITE UR QL STRIP: NEGATIVE
NRBC BLD AUTO-RTO: 0 /100 WBC (ref 0–0.2)
OPIATES UR QL: POSITIVE
OXYCODONE UR QL SCN: NEGATIVE
PCP UR QL SCN: NEGATIVE
PH UR STRIP.AUTO: <=5 [PH] (ref 5–9)
PLATELET # BLD AUTO: 293 10*3/MM3 (ref 140–450)
PMV BLD AUTO: 10.9 FL (ref 6–12)
POTASSIUM BLD-SCNC: 3.9 MMOL/L (ref 3.5–5.2)
PROPOXYPH UR QL: NEGATIVE
PROT SERPL-MCNC: 8.1 G/DL (ref 6–8.5)
PROT UR QL STRIP: NEGATIVE
RBC # BLD AUTO: 4.89 10*6/MM3 (ref 3.77–5.28)
RBC # UR: ABNORMAL /HPF
REF LAB TEST METHOD: ABNORMAL
SALICYLATES SERPL-MCNC: <0.3 MG/DL
SODIUM BLD-SCNC: 142 MMOL/L (ref 136–145)
SP GR UR STRIP: 1.03 (ref 1–1.03)
SQUAMOUS #/AREA URNS HPF: ABNORMAL /HPF
TRICYCLICS UR QL SCN: NEGATIVE
UROBILINOGEN UR QL STRIP: ABNORMAL
WBC NRBC COR # BLD: 8.9 10*3/MM3 (ref 3.4–10.8)
WBC UR QL AUTO: ABNORMAL /HPF
WHOLE BLOOD HOLD SPECIMEN: NORMAL
WHOLE BLOOD HOLD SPECIMEN: NORMAL

## 2019-08-20 PROCEDURE — 80307 DRUG TEST PRSMV CHEM ANLYZR: CPT | Performed by: FAMILY MEDICINE

## 2019-08-20 PROCEDURE — 80306 DRUG TEST PRSMV INSTRMNT: CPT | Performed by: FAMILY MEDICINE

## 2019-08-20 PROCEDURE — 36415 COLL VENOUS BLD VENIPUNCTURE: CPT

## 2019-08-20 PROCEDURE — 81001 URINALYSIS AUTO W/SCOPE: CPT | Performed by: FAMILY MEDICINE

## 2019-08-20 PROCEDURE — 84703 CHORIONIC GONADOTROPIN ASSAY: CPT | Performed by: FAMILY MEDICINE

## 2019-08-20 PROCEDURE — 80053 COMPREHEN METABOLIC PANEL: CPT | Performed by: FAMILY MEDICINE

## 2019-08-20 PROCEDURE — 82607 VITAMIN B-12: CPT | Performed by: PSYCHIATRY & NEUROLOGY

## 2019-08-20 PROCEDURE — 99285 EMERGENCY DEPT VISIT HI MDM: CPT

## 2019-08-20 PROCEDURE — 85025 COMPLETE CBC W/AUTO DIFF WBC: CPT | Performed by: FAMILY MEDICINE

## 2019-08-20 RX ORDER — CLONIDINE HYDROCHLORIDE 0.1 MG/1
0.1 TABLET ORAL EVERY 4 HOURS PRN
Status: DISCONTINUED | OUTPATIENT
Start: 2019-08-20 | End: 2019-08-24 | Stop reason: HOSPADM

## 2019-08-20 RX ORDER — FAMOTIDINE 20 MG/1
20 TABLET, FILM COATED ORAL 2 TIMES DAILY PRN
Status: DISCONTINUED | OUTPATIENT
Start: 2019-08-20 | End: 2019-08-21 | Stop reason: SDUPTHER

## 2019-08-20 RX ORDER — LOPERAMIDE HYDROCHLORIDE 2 MG/1
2 CAPSULE ORAL
Status: DISCONTINUED | OUTPATIENT
Start: 2019-08-20 | End: 2019-08-24 | Stop reason: HOSPADM

## 2019-08-20 RX ORDER — HYDROXYZINE PAMOATE 50 MG/1
50 CAPSULE ORAL EVERY 6 HOURS PRN
Status: DISCONTINUED | OUTPATIENT
Start: 2019-08-20 | End: 2019-08-24 | Stop reason: HOSPADM

## 2019-08-20 RX ORDER — TRAZODONE HYDROCHLORIDE 50 MG/1
50 TABLET ORAL NIGHTLY PRN
Status: DISCONTINUED | OUTPATIENT
Start: 2019-08-20 | End: 2019-08-24 | Stop reason: HOSPADM

## 2019-08-20 RX ORDER — HYDROCODONE BITARTRATE AND ACETAMINOPHEN 7.5; 325 MG/1; MG/1
1 TABLET ORAL EVERY 6 HOURS PRN
Status: DISCONTINUED | OUTPATIENT
Start: 2019-08-20 | End: 2019-08-24 | Stop reason: HOSPADM

## 2019-08-20 RX ORDER — NICOTINE 21 MG/24HR
1 PATCH, TRANSDERMAL 24 HOURS TRANSDERMAL EVERY 24 HOURS
Status: DISCONTINUED | OUTPATIENT
Start: 2019-08-21 | End: 2019-08-24 | Stop reason: HOSPADM

## 2019-08-20 RX ORDER — ACETAMINOPHEN 325 MG/1
650 TABLET ORAL EVERY 4 HOURS PRN
Status: DISCONTINUED | OUTPATIENT
Start: 2019-08-20 | End: 2019-08-20

## 2019-08-20 RX ADMIN — HYDROXYZINE PAMOATE 50 MG: 50 CAPSULE ORAL at 21:05

## 2019-08-20 RX ADMIN — HYDROCODONE BITARTRATE AND ACETAMINOPHEN 1 TABLET: 7.5; 325 TABLET ORAL at 21:05

## 2019-08-20 NOTE — NURSING NOTE
Review of Current Symptoms--only current symptoms    Patient is a 39 year old female, smoker. Patient has chronic pain from broken foot with rods and plate. Patient takes norco.    · General   NONE    · Eyes    None     · ENT/Mouth    None    · Cardio    None    · Resp    None    · GI     None    ·     None    · MS    None    · Skin/Hair/Nails    None    · Neuro    None

## 2019-08-20 NOTE — NURSING NOTE
"Inpatient Psychiatry Initial Intake    8/20/2019    Chris Valdez, a 39 y.o. female, for initial evaluation visit.  Patient is referred by Dr. Dickens.    Patient information was obtained from patient and spouse/SO.  Patient presented voluntarily to the Emergency Department.  Precipitant and chief complaint: According to She,  Diagnosed with schizophrenia earlier this year but  States she has had visual hallucinations since the age of 27. States she hasn't has auditory hallucinations in about 30 days. Patient reports Sunday at work she saw bugs crawling all over her and her coworkers and \"had to be talked down\" then was sent home. She states Monday she slept all day and today wentt o work but was overwhelmed with anxiety and went home. She and her  presented to ER..  Patient presents with AVH, anxiety.   Onset of symptoms was abrupt starting 3 days ago.  Patient states symptoms have been exacerbated by ..      Current Medications:  Scheduled Meds: haldol abilify topomax klonopin norco   PRN Meds:     History:     Past Psychiatric History:   Previous therapy: yes  Previous psychiatric treatment and medication trials:  yes - .  Previous psychiatric hospitalizations:  yes - [facility]  Previous diagnoses:     yes - schizophrenia  Previous suicide attempts:    no  Previous homicide attempts:    no  Family history of suicide:    no  Previous history of abuse:     no         Further details: .        No  History of legal issues and violence: .  Currently in treatment with [facility].  Education: college graduate  Other pertinent history: None    Current Evaluation:     Mental Status Evaluation:  Appearance:  age appropriate   Behavior:  normal   Speech:  normal pitch and normal volume   Mood:  depressed   Affect:  flat   Thought Process:  circumstantial   Thought Content:  hallucinations   Sensorium:  person, place, time/date and situation   Cognition:  grossly intact   Insight:  good   Judgment:  fair "         Psychiatric Review Of Systems:  Sleep: no  Appetite changes: no  Weight changes: no  Energy: no  Interest/pleasure/anhedonia: no  Libido: no  Sexual orientation:  heterosexual  Anxiety/panic: yes  Guilty/hopeless: yes  Self-injurious behavior/risky behavior: no    Stressors: .    Substance Abuse History:     Substance Abuse History:  Tobacco use: yes - .  Use of alcohol: no  Recreational drugs: marijuana  Use of OTC medications: .    Hx of Overdose:  no  Hx of Blackouts: no  Past attempts to quit or limit use?:no  Patient feels she has mental, emotional, or medical problems co-occurred or worsened as a result of alcohol and/or drug use: no    Suicide Risk Screening:     Suicidal Ideation:  Current thoughts of suicide?no  Recent thoughts of suicide?no    Suicide Planning:  Specific Plan?no  Thought Content/Patients own words:.  .  Potentially lethal means?no  Access to gun?no  Access to other lethal means?no    Suicide Attempt:  Recent attempt?no  Past attempt?no  Cutting/burning/self-mutilation?no      Protective Factors:  .      Homicide Risk Screening:     Homicidal Ideation:  Current thoughts of homicide:  no  Recent thoughts of homicide:  no    Homicidal Planning:  Specific Plan?  no  Thought Content/Patients own words:..  Access/Means?  no    Perceptual Disturbances     Auditory:  yes - .phones riinging  Hallucinations continued:  auditory and visual    Hypnopompic hallucinations? no Patients own words: .  .  Hypnagogic hallucinations?  no  Patients own words: ..    Delusions? no .  Patients own words: ..    Shared Delusion no        Recommendations:     Reviewed with: Dr. Frazier    Medically cleared by: Dr. Prince  Admit to Inpatient Behavioral Health Unit: yes - .  If admitted to unit please perform Review of Current Symptoms for Dr. Sandoval.      ( christine ) note    Guillermina Mendiola RN

## 2019-08-20 NOTE — PLAN OF CARE
Problem: Patient Care Overview  Goal: Plan of Care Review  Outcome: Ongoing (interventions implemented as appropriate)   08/20/19 1800   Coping/Psychosocial   Plan of Care Reviewed With patient   Coping/Psychosocial   Patient Agreement with Plan of Care agrees   Plan of Care Review   Progress improving     Goal: Individualization and Mutuality  Outcome: Ongoing (interventions implemented as appropriate)    Goal: Discharge Needs Assessment  Outcome: Ongoing (interventions implemented as appropriate)    Goal: Interprofessional Rounds/Family Conf  Outcome: Ongoing (interventions implemented as appropriate)      Problem: Overarching Goals (Adult)  Goal: Adheres to Safety Considerations for Self and Others  Outcome: Ongoing (interventions implemented as appropriate)    Goal: Optimized Coping Skills in Response to Life Stressors  Outcome: Ongoing (interventions implemented as appropriate)    Goal: Develops/Participates in Therapeutic [place] to Support Successful Transition  Outcome: Ongoing (interventions implemented as appropriate)      Problem: Mood Impairment (Anxiety Signs/Symptoms) (Adult)  Goal: Improved Mood Symptoms (Anxiety Signs/Symptoms)  Outcome: Ongoing (interventions implemented as appropriate)      Problem: Cognitive Impairment (Anxiety Signs/Symptoms) (Adult)  Goal: Improved Cognitive Function (Anxiety Signs/Symptoms)  Outcome: Ongoing (interventions implemented as appropriate)      Problem: Sensory Perception Impairment (Psychotic Signs/Symptoms) (Pediatric)  Goal: Decrease Frequency/Intensity of Sensory Symptoms(Psychotic Signs/Symptoms)  Outcome: Ongoing (interventions implemented as appropriate)      Problem: Mental State/Mood Impairment (Psychotic Signs/Symptoms) (Pediatric)  Goal: Improved Mental State/Mood (Psychotic Signs/Symptoms)  Outcome: Ongoing (interventions implemented as appropriate)

## 2019-08-20 NOTE — NURSING NOTE
"Patient admitted to [facility] via wheelchair accompanied by security and ER tech. Patient wanded in bay. No belongings present, sent home with . Patient changed into paper scrubs. Oriented to room and unit.     Patient reports recent dx of schizophrenia and hospitalizations at [facility] in May and [facility] in July. Patient is seeing Dr. Ba at [facility]. Patient reports Sunday at work she saw bugs crawling all over her and her coworkers. She reports having to be \"talked down\" and then sent home. Patient reports attempting to return to work today but was overwhelmed with anxiety and could not stay. Patient reports decreased energy level. Patient reports depression and hopelessness over coping with her diagnosis stating, \"Is that going to be good enough for them that I'm not going to be the way that I used to be. Are they going to be able to handle that? I don't know if I am.\" Patient reports a family hx of great grandmother being dx with schizophrenia during menopause, great great grandmother committed suicide by drinking lye, aunt commited suicide by insulin injection and great aunt dx with bipolar and received ECT.   Patient reports seeing \"all kinds of crazy stuff\" including safari animals, worms, hearing cell phones ring for hours. Patient states she called her mother asking for her grandmothers room number at the nursing home but she had been dead for 10 years. She reports calling her  to come home d/t spies out to get him.  "

## 2019-08-20 NOTE — ED PROVIDER NOTES
"Subjective   Patient is a 39-year-old  female presenting to [facility] ED complaining of hallucinations and worsening anxiety.  Patient has CMH of schizophrenia, generalized anxiety disorder, migraines.  Patient is alert and oriented x3.  Patient reported that she has \"had a rough few days.\"  Patient endorsed that she was diagnosed with schizophrenia earlier this year by Dr. Ba at [facility], patient has been seeing Dr. Ba for months.  Patient reported that she had hallucinations of \"bugs crawling on her skin\" on Sunday.  Patient endorsed that a coworker talked to her and convinced her that they were not real and patient took a Klonopin which \"stopped the hallucinations\"     Patient reported that earlier today she had \"really bad anxiety when she was clocking into work \"and stated that she \"could not work because of it and took a Klonopin which helped\" She endorsed that she came to the ED today because she desires help .Patient stated \" I really need some help\"    Patient denies chest pain, chest pressure, palpitations, fever, chills, nausea vomiting diarrhea at this time.  Of note patient denies suicidal ideation or homicidal ideations, or self harm                  Review of Systems   Constitutional: Negative for activity change, appetite change, chills, fatigue and fever.   HENT: Negative for congestion, hearing loss, sinus pressure, sinus pain, sneezing, sore throat and trouble swallowing.    Eyes: Negative for pain, discharge and itching.   Respiratory: Negative for apnea, cough, choking, chest tightness, shortness of breath, wheezing and stridor.    Cardiovascular: Negative for chest pain, palpitations and leg swelling.   Gastrointestinal: Negative for abdominal distention, abdominal pain, anal bleeding, constipation, diarrhea, nausea and vomiting.   Genitourinary: Negative for difficulty urinating, dysuria, enuresis and flank pain.   Musculoskeletal: Negative for arthralgias, back pain and gait " "problem.   Skin: Negative for color change.   Neurological: Negative for dizziness, seizures, syncope, facial asymmetry, light-headedness, numbness and headaches.   Psychiatric/Behavioral: Positive for dysphoric mood and hallucinations. Negative for agitation. The patient is nervous/anxious.        Past Medical History:   Diagnosis Date   • Abdominal pain    • Acute bronchitis    • Acute upper respiratory infection    • Anxiety state    • Backache    • Bell's palsy    • Cellulitis      left breast      • Chest wall pain     right lateral chest wall-poss early shingles      • Chest wall pain 10/21/2015    right lateral chest wall-poss early shingles      • Chronic low back pain     May 13 2014 MRI ddd with mild left neuroforminal narrowing l4-l5      • Chronic retention of urine     ACUTE   • Cough    • Depressive disorder    • Dysuria    • Facial hemiparesis (CMS/HCC)    • Facial hemiparesis (CMS/HCC)    • Fatigue    • Fatigue    • Fever    • Ganglion cyst     LEFT BREAST   • Generalized anxiety disorder    • Headache    • Insomnia    • Migraine    • Pain and other symptoms associated with female genital organs    • Pain radiating to back     LUMBAR   • Postnasal drip    • Retention of urine    • Schizophrenia (CMS/HCC)    • Skin sensation disturbance    • Urinary incontinence    • Urinary tract infectious disease    • Viral gastroenteritis    • Vulvovaginitis        Allergies   Allergen Reactions   • Compazine [Prochlorperazine Edisylate] Anxiety     \"coming out of my skin\"       Past Surgical History:   Procedure Laterality Date   • ANKLE OPEN REDUCTION INTERNAL FIXATION Left 2017    Procedure: ANKLE OPEN REDUCTION INTERNAL FIXATION;  Surgeon: Leo Cisneros MD;  Location: [facility];  Service:    • APPENDECTOMY     •  SECTION     • CHOLECYSTECTOMY     • INJECTION OF MEDICATION  2016    KENALOG(4)   • INJECTION OF MEDICATION  2011    PHENERGAN(1)   • INJECTION OF MEDICATION  " "09/14/2015    ROCEPHIN(1)   • INJECTION OF MEDICATION  06/05/2015    TORADOL(2)       Family History   Problem Relation Age of Onset   • Alzheimer's disease Other    • ADD / ADHD Other    • Depression Other    • Diabetes Other    • Migraines Other        Social History     Socioeconomic History   • Marital status:      Spouse name: Not on file   • Number of children: Not on file   • Years of education: Not on file   • Highest education level: Not on file   Tobacco Use   • Smoking status: Current Every Day Smoker     Packs/day: 1.00     Types: Cigarettes   • Smokeless tobacco: Never Used   Substance and Sexual Activity   • Alcohol use: Yes     Comment: occ   • Drug use: No   • Sexual activity: Defer           Objective    /82 (BP Location: Right arm, Patient Position: Sitting)   Pulse 89   Temp 98.5 °F (36.9 °C) (Oral)   Resp 18   Ht 162.6 cm (64\")   Wt 86.2 kg (190 lb)   SpO2 95%   BMI 32.61 kg/m²     Physical Exam   Constitutional: She is oriented to person, place, and time. She appears well-developed and well-nourished. No distress.   HENT:   Head: Normocephalic and atraumatic.   Right Ear: External ear normal.   Left Ear: External ear normal.   Nose: Nose normal.   Mouth/Throat: Oropharynx is clear and moist.   Eyes: EOM are normal. Pupils are equal, round, and reactive to light. Right eye exhibits no discharge. Left eye exhibits no discharge.   Neck: Normal range of motion. Neck supple. No tracheal deviation present. No thyromegaly present.   Cardiovascular: Normal rate, regular rhythm, normal heart sounds and intact distal pulses.   Pulmonary/Chest: Effort normal and breath sounds normal. No stridor. No respiratory distress. She has no wheezes. She has no rales.   Abdominal: Soft. Bowel sounds are normal. She exhibits no distension. There is no tenderness.   Musculoskeletal: Normal range of motion. She exhibits no edema, tenderness or deformity.   Lymphadenopathy:     She has no cervical " adenopathy.   Neurological: She is alert and oriented to person, place, and time. No cranial nerve deficit.   Skin: Skin is warm and dry. She is not diaphoretic.   Psychiatric: She has a normal mood and affect.       Procedures           ED Course  ED Course as of Aug 20 1634   Tue Aug 20, 2019   1439 CMP was significant for chloride at 109, CO2 20.0  Acetaminophen levels were under 5.0  [AM]   1632 Patient was accepted into [facility] psych floor   [AM]   1632 UA was significant to cloudy appearance, specific gravity 1.033, trace ketones, 1+ bilirubin, 2+ blood, 1+ leukocytes, negative nitrites  [AM]   1634 UDS was no resulted at time of discharge to [facility] psych floor   [AM]      ED Course User Index  [AM] Constantine Prince MD                  MDM  Number of Diagnoses or Management Options  Sasha (CMS/HCC):      Amount and/or Complexity of Data Reviewed  Clinical lab tests: reviewed  Decide to obtain previous medical records or to obtain history from someone other than the patient: yes  Discuss the patient with other providers: yes          Final diagnoses:   Sasha (CMS/HCC)     Signed,   Constantine Prince MD  Family Medicine Resident PGY2  [facility]        This document has been electronically signed by Constantine Prince MD on August 20, 2019 4:34 PM             Constantine Prince MD  Resident  08/20/19 1339

## 2019-08-21 PROBLEM — F33.3 SEVERE EPISODE OF RECURRENT MAJOR DEPRESSIVE DISORDER, WITH PSYCHOTIC FEATURES (HCC): Status: ACTIVE | Noted: 2019-08-21

## 2019-08-21 PROBLEM — F20.9 SCHIZOPHRENIA: Status: ACTIVE | Noted: 2019-08-20

## 2019-08-21 PROBLEM — F43.10 POST TRAUMATIC STRESS DISORDER (PTSD): Status: ACTIVE | Noted: 2019-08-21

## 2019-08-21 LAB
CHOLEST SERPL-MCNC: 161 MG/DL (ref 0–200)
GLUCOSE P FAST SERPL-MCNC: 83 MG/DL (ref 72–112)
HDLC SERPL-MCNC: 43 MG/DL (ref 40–60)
LDLC SERPL CALC-MCNC: 99 MG/DL (ref 0–100)
LDLC/HDLC SERPL: 2.31 {RATIO}
TRIGL SERPL-MCNC: 94 MG/DL (ref 0–150)
VLDLC SERPL-MCNC: 18.8 MG/DL

## 2019-08-21 PROCEDURE — 90833 PSYTX W PT W E/M 30 MIN: CPT | Performed by: PSYCHIATRY & NEUROLOGY

## 2019-08-21 PROCEDURE — 82306 VITAMIN D 25 HYDROXY: CPT | Performed by: PSYCHIATRY & NEUROLOGY

## 2019-08-21 PROCEDURE — 80061 LIPID PANEL: CPT | Performed by: PSYCHIATRY & NEUROLOGY

## 2019-08-21 PROCEDURE — 82947 ASSAY GLUCOSE BLOOD QUANT: CPT | Performed by: PSYCHIATRY & NEUROLOGY

## 2019-08-21 PROCEDURE — 93010 ELECTROCARDIOGRAM REPORT: CPT | Performed by: INTERNAL MEDICINE

## 2019-08-21 PROCEDURE — 99223 1ST HOSP IP/OBS HIGH 75: CPT | Performed by: PSYCHIATRY & NEUROLOGY

## 2019-08-21 PROCEDURE — 93005 ELECTROCARDIOGRAM TRACING: CPT | Performed by: PSYCHIATRY & NEUROLOGY

## 2019-08-21 PROCEDURE — 99254 IP/OBS CNSLTJ NEW/EST MOD 60: CPT | Performed by: FAMILY MEDICINE

## 2019-08-21 RX ORDER — CELECOXIB 200 MG/1
200 CAPSULE ORAL DAILY
Status: DISCONTINUED | OUTPATIENT
Start: 2019-08-21 | End: 2019-08-24 | Stop reason: HOSPADM

## 2019-08-21 RX ORDER — CLONAZEPAM 0.5 MG/1
1 TABLET ORAL 3 TIMES DAILY PRN
Status: DISCONTINUED | OUTPATIENT
Start: 2019-08-21 | End: 2019-08-24 | Stop reason: HOSPADM

## 2019-08-21 RX ORDER — HALOPERIDOL 5 MG/1
10 TABLET ORAL NIGHTLY
Status: DISCONTINUED | OUTPATIENT
Start: 2019-08-21 | End: 2019-08-21

## 2019-08-21 RX ORDER — HALOPERIDOL 5 MG/1
10 TABLET ORAL NIGHTLY
Status: DISCONTINUED | OUTPATIENT
Start: 2019-08-22 | End: 2019-08-24 | Stop reason: HOSPADM

## 2019-08-21 RX ORDER — TOPIRAMATE 100 MG/1
100 TABLET, FILM COATED ORAL EVERY 12 HOURS SCHEDULED
Status: DISCONTINUED | OUTPATIENT
Start: 2019-08-21 | End: 2019-08-24 | Stop reason: HOSPADM

## 2019-08-21 RX ORDER — FAMOTIDINE 20 MG/1
20 TABLET, FILM COATED ORAL
Status: DISCONTINUED | OUTPATIENT
Start: 2019-08-21 | End: 2019-08-24 | Stop reason: HOSPADM

## 2019-08-21 RX ORDER — FLUOXETINE HYDROCHLORIDE 20 MG/1
20 CAPSULE ORAL DAILY
Status: DISCONTINUED | OUTPATIENT
Start: 2019-08-21 | End: 2019-08-22

## 2019-08-21 RX ORDER — HALOPERIDOL 5 MG/1
5 TABLET ORAL ONCE
Status: COMPLETED | OUTPATIENT
Start: 2019-08-21 | End: 2019-08-21

## 2019-08-21 RX ORDER — HALOPERIDOL 5 MG/1
5 TABLET ORAL NIGHTLY
Status: COMPLETED | OUTPATIENT
Start: 2019-08-21 | End: 2019-08-21

## 2019-08-21 RX ORDER — SUMATRIPTAN 50 MG/1
50 TABLET, FILM COATED ORAL
Status: DISCONTINUED | OUTPATIENT
Start: 2019-08-21 | End: 2019-08-24 | Stop reason: HOSPADM

## 2019-08-21 RX ORDER — CYCLOBENZAPRINE HCL 10 MG
10 TABLET ORAL 3 TIMES DAILY
Status: DISCONTINUED | OUTPATIENT
Start: 2019-08-21 | End: 2019-08-24 | Stop reason: HOSPADM

## 2019-08-21 RX ADMIN — CELECOXIB 200 MG: 200 CAPSULE ORAL at 14:43

## 2019-08-21 RX ADMIN — HYDROCODONE BITARTRATE AND ACETAMINOPHEN 1 TABLET: 7.5; 325 TABLET ORAL at 14:44

## 2019-08-21 RX ADMIN — HYDROCODONE BITARTRATE AND ACETAMINOPHEN 1 TABLET: 7.5; 325 TABLET ORAL at 08:16

## 2019-08-21 RX ADMIN — CYCLOBENZAPRINE HYDROCHLORIDE 10 MG: 10 TABLET, FILM COATED ORAL at 20:35

## 2019-08-21 RX ADMIN — HALOPERIDOL 5 MG: 5 TABLET ORAL at 17:08

## 2019-08-21 RX ADMIN — CYCLOBENZAPRINE HYDROCHLORIDE 10 MG: 10 TABLET, FILM COATED ORAL at 14:43

## 2019-08-21 RX ADMIN — HYDROXYZINE PAMOATE 50 MG: 50 CAPSULE ORAL at 08:16

## 2019-08-21 RX ADMIN — HYDROCODONE BITARTRATE AND ACETAMINOPHEN 1 TABLET: 7.5; 325 TABLET ORAL at 21:35

## 2019-08-21 RX ADMIN — HYDROXYZINE PAMOATE 50 MG: 50 CAPSULE ORAL at 17:11

## 2019-08-21 RX ADMIN — NICOTINE 1 PATCH: 21 PATCH, EXTENDED RELEASE TRANSDERMAL at 08:15

## 2019-08-21 RX ADMIN — FLUOXETINE 20 MG: 20 CAPSULE ORAL at 20:35

## 2019-08-21 RX ADMIN — CLONAZEPAM 1 MG: 0.5 TABLET ORAL at 12:38

## 2019-08-21 RX ADMIN — TOPIRAMATE 100 MG: 100 TABLET, FILM COATED ORAL at 20:35

## 2019-08-21 RX ADMIN — FAMOTIDINE 20 MG: 20 TABLET ORAL at 14:47

## 2019-08-21 RX ADMIN — TOPIRAMATE 100 MG: 100 TABLET, FILM COATED ORAL at 14:42

## 2019-08-21 RX ADMIN — CLONAZEPAM 1 MG: 0.5 TABLET ORAL at 21:35

## 2019-08-21 RX ADMIN — HALOPERIDOL 5 MG: 5 TABLET ORAL at 20:35

## 2019-08-21 NOTE — PLAN OF CARE
Problem: Patient Care Overview  Goal: Plan of Care Review  Outcome: Ongoing (interventions implemented as appropriate)   08/20/19 1800 08/20/19 2015   Coping/Psychosocial   Plan of Care Reviewed With --  patient   Coping/Psychosocial   Patient Agreement with Plan of Care --  agrees   Plan of Care Review   Progre   08/20/19 1800 08/20/19 2015   Coping/Psychosocial   Plan of Care Reviewed With --  patient   Coping/Psychosocial   Patient Agreement with Plan of Care --  agrees   Plan of Care Review   Progress improving --    ss improving --      Goal: Individualization and Mutuality  Outcome: Ongoing (interventions implemented as appropriate)    Goal: Discharge Needs Assessment  Outcome: Ongoing (interventions implemented as appropriate)    Goal: Interprofessional Rounds/Family Conf  Outcome: Ongoing (interventions implemented as appropriate)      Problem: Overarching Goals (Adult)  Goal: Adheres to Safety Considerations for Self and Others  Outcome: Ongoing (interventions implemented as appropriate)    Goal: Optimized Coping Skills in Response to Life Stressors  Outcome: Ongoing (interventions implemented as appropriate)    Goal: Develops/Participates in Therapeutic [place] to Support Successful Transition  Outcome: Ongoing (interventions implemented as appropriate)      Problem: Mood Impairment (Anxiety Signs/Symptoms) (Adult)  Goal: Improved Mood Symptoms (Anxiety Signs/Symptoms)  Outcome: Ongoing (interventions implemented as appropriate)      Problem: Cognitive Impairment (Anxiety Signs/Symptoms) (Adult)  Goal: Improved Cognitive Function (Anxiety Signs/Symptoms)  Outcome: Ongoing (interventions implemented as appropriate)      Problem: Sensory Perception Impairment (Psychotic Signs/Symptoms) (Pediatric)  Goal: Decrease Frequency/Intensity of Sensory Symptoms(Psychotic Signs/Symptoms)  Outcome: Ongoing (interventions implemented as appropriate)      Problem: Mental State/Mood Impairment (Psychotic Signs/Symptoms)  (Pediatric)  Goal: Improved Mental State/Mood (Psychotic Signs/Symptoms)  Outcome: Ongoing (interventions implemented as appropriate)      Problem: Pain, Chronic (Adult)  Goal: Identify Related Risk Factors and Signs and Symptoms  Outcome: Ongoing (interventions implemented as appropriate)    Goal: Acceptable Pain/Comfort Level and Functional Ability  Outcome: Ongoing (interventions implemented as appropriate)

## 2019-08-21 NOTE — PLAN OF CARE
Problem: Pain, Chronic (Adult)  Goal: Identify Related Risk Factors and Signs and Symptoms  Outcome: Outcome(s) achieved Date Met: 08/21/19
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
## 2019-08-21 NOTE — CONSULTS
"  CHIEF COMPLAINT/REASON FOR VISIT:  auditory Hallucinations and Visual Hallucinations    HPI:  Patient presented to our ED with the above complaint on  just after noon.  She reported worsening hallucinations for the last 3 to 4 days.  At one point she reported that she had visual hallucinations for at least 12 years and has been diagnosed with schizophrenia earlier this year.  She has been seen at [facility] and has been able to continue working until very recently when she saw bugs, safari animals, and all kinds of strange things and the anxiety just overwhelmed her.  At one point a coworker urged her to take Klonopin and when she did the hallucinations went away.  To behavioral health unit staff she also reported actually calling the nursing home asking to speak to her mother who is .  She also had persecution delusions and called her  home from work one day because she thought people were threatening him.    Noncontrast MRI done on 2019 was normal.  There is an outpatient PCP note from May 13 of 2019 that labeled her as schizoaffective and continued Abilify and Wellbutrin with a note to wean off Dilaudid.    She was admitted to [facility] for strokelike symptoms in  and the discharge from that note shows:    \"HOSPITAL SUMMARY: This 34 year old woman was admitted in transfer  from [facility] with symptoms of left-sided numb  paresthesias and left-sided weakness which had resolved by the time  she had arrived at [facility]. Her evaluation in the hospital  consisted of a CBC which was normal, sed rate which was normal x 2,  normal INR. A basic metabolic panel was normal. A lipid panel was  normal. The glucose throughout the hospitalization were normal. C-  reactive protein was normal. CT angiogram of the head and neck was  normal. MRI scan of the brain was normal and negative. EKG was  normal. Echocardiogram was normal and there was no evidence for  patent " "foramen ovale or any intracardiac source for emboli. She was  seen in consultation by Dr. Jaycee Sheets, neurologist, who  felt that no further workup other than as ordered by the hospitalist  staff was indicated for her. We were unable to get an EEG because it  was the weekend and felt that this was not essential. Pending studies  include an anticardiolipin antibody and an antineutrophil antibody and  an SANDEEP all of which are pending at time of this discharge. It seems  very unlikely that her symptoms represent cerebral vasculitis. There  is certainly no evidence for hemolytic anemia to suggest thrombotic  thrombocytopenia. The possibility of a conversion reaction was  discussed with her, but she dismissed this as even a remote  possibility and stated that she is under no stress and has an  excellent support system via her fiance, family and Protestant friends at  her hometown. However, it should be noted that she does have a  history of psychiatric problems and has been hospitalized at least  once at [facility] in [place].\"  The anticardiolipin and antineutrophil antibodies returned later as normal.      This morning she is quiet and cooperative.    PROBLEM LIST:  Patient Active Problem List    Diagnosis   • Manic disorder, recurrent episode, severe, with psychotic behavior (CMS/HCC) [F31.2]   • Schizoaffective disorder (CMS/HCC) [F25.9]   • Memory loss [R41.3]   • Acute nonintractable headache [R51]   • Viral respiratory illness [J98.8, B97.89]   • Post-traumatic arthritis of left ankle [M19.172]   • Closed trimalleolar fracture of left ankle [S82.852A]   • Syncope and collapse [R55]   • Adenopathy [R59.1]   • Depression [F32.9]   • Anxiety [F41.9]   • Muscle pain, cervical [M54.2]   • Pain radiating to back [M54.9]   • Generalized anxiety disorder [F41.1]   • Depressive disorder [F32.9]   • Chronic low back pain [M54.5, G89.29]   • Backache [M54.9]         CURRENT MEDICATIONS:  Medications Prior " to Admission   Medication Sig Dispense Refill Last Dose   • ARIPiprazole (ABILIFY) 20 MG tablet Take one (1) tablet by mouth at bedtime. 30 tablet 1 8/19/2019 at Unknown time   • celecoxib (CeleBREX) 200 MG capsule Take 1 capsule by mouth daily 30 capsule 1 8/20/2019 at Unknown time   • clonazePAM (KlonoPIN) 1 MG tablet Take one (1) tablet by mouth three times a day, as needed. 90 tablet 0 8/20/2019 at Unknown time   • cyclobenzaprine (FLEXERIL) 10 MG tablet Take 1 tablet by mouth 3 (three) times a day. 90 tablet 1 8/20/2019 at Unknown time   • famotidine (PEPCID) 20 MG tablet Take 1 tablet by mouth twice a day 60 tablet 1 8/20/2019 at Unknown time   • FLUoxetine (PROzac) 20 MG capsule Take one (1) capsule by mouth every morning. 30 capsule 1 8/20/2019 at Unknown time   • haloperidol (HALDOL) 1 MG tablet Take two (2) tablets by mouth at bedtime. 60 tablet 1 8/19/2019 at Unknown time   • Topiramate  MG capsule extended-release 24 hour sprinkle Take one (1) capsule by mouth every morning. 30 each 1 8/20/2019 at Unknown time   • traZODone (DESYREL) 150 MG tablet Take 1 tablet by mouth At Bedtime As Needed. 30 tablet 1 8/19/2019 at Unknown time   • buPROPion XL (WELLBUTRIN XL) 300 MG 24 hr tablet Take one (1) tablet by mouth every morning. 30 tablet 1    • diclofenac (VOLTAREN) 1 % gel gel Apply 1 application to bilateral knees and lower back three times a day 100 g 1 Taking   • HYDROcodone-acetaminophen (NORCO) 7.5-325 MG per tablet Take 1 tablet by mouth 4 (Four) Times a Day As Needed for Pain. 120 tablet 0    • levonorgestrel (MIRENA, 52 MG,) 20 MCG/24HR IUD 1 each by Intrauterine route 1 (One) Time for 1 dose. 1 each 0    • lidocaine (XYLOCAINE) 5 % ointment Apply 1 to 3 gram onto the skin of the back, arms, legs, and other painful joints 3 (Three) Times a Day as directed. 425.28 g 0    • melatonin 5 MG tablet tablet Take 10 mg by mouth Every Night.   Taking   • Naloxone HCl (NARCAN) 2 MG/2ML injection Inject  1 syringe intramuscular as directed. 1 syringe 0    • SUMAtriptan (IMITREX) 50 MG tablet Take one tablet at onset of headache. May repeat dose one time in 2 hours if headache not relieved. 9 tablet 11 Taking       ALLERGIES:  Compazine [prochlorperazine edisylate]      PAST MEDICAL/SURGICAL HISTORY:  Past Medical History:   Diagnosis Date   • Abdominal pain    • Acute bronchitis    • Acute upper respiratory infection    • Anxiety state    • Backache    • Bell's palsy    • Cellulitis      left breast      • Chest wall pain     right lateral chest wall-poss early shingles      • Chest wall pain 10/21/2015    right lateral chest wall-poss early shingles      • Chronic low back pain     May 13 2014 MRI ddd with mild left neuroforminal narrowing l4-l5      • Chronic retention of urine     ACUTE   • Cough    • Depressive disorder    • Dysuria    • Facial hemiparesis (CMS/HCC)    • Facial hemiparesis (CMS/HCC)    • Fatigue    • Fatigue    • Fever    • Ganglion cyst     LEFT BREAST   • Generalized anxiety disorder    • Headache    • Insomnia    • Migraine    • Pain and other symptoms associated with female genital organs    • Pain radiating to back     LUMBAR   • Postnasal drip    • Retention of urine    • Schizophrenia (CMS/HCC)    • Skin sensation disturbance    • Urinary incontinence    • Urinary tract infectious disease    • Viral gastroenteritis    • Vulvovaginitis        Past Surgical History:   Procedure Laterality Date   • ANKLE OPEN REDUCTION INTERNAL FIXATION Left 2017    Procedure: ANKLE OPEN REDUCTION INTERNAL FIXATION;  Surgeon: Leo Cisneros MD;  Location: [facility];  Service:    • APPENDECTOMY     •  SECTION     • CHOLECYSTECTOMY     • INJECTION OF MEDICATION  2016    KENALOG(4)   • INJECTION OF MEDICATION  2011    PHENERGAN(1)   • INJECTION OF MEDICATION  2015    ROCEPHIN(1)   • INJECTION OF MEDICATION  2015    TORADOL(2)       Review of Systems  "  Constitutional: Negative for activity change, appetite change, fatigue and fever.   HENT: Negative for congestion, ear discharge, ear pain, facial swelling, hearing loss, nosebleeds, postnasal drip, rhinorrhea, sinus pressure, sore throat, tinnitus and trouble swallowing.    Eyes: Negative for pain, discharge and visual disturbance.   Respiratory: Negative for cough, shortness of breath and wheezing.    Cardiovascular: Negative for chest pain, palpitations and leg swelling.   Gastrointestinal: Negative for abdominal pain, blood in stool, constipation, diarrhea, nausea and vomiting.   Genitourinary: Negative for difficulty urinating, dyspareunia, dysuria, flank pain, frequency, hematuria, menstrual problem, pelvic pain, urgency, vaginal bleeding and vaginal discharge.   Musculoskeletal: Negative for arthralgias, back pain, joint swelling, myalgias and neck pain.        Patient reports foot pain from previous fracture.   Skin: Negative for rash and wound.   Neurological: Negative for dizziness, seizures, syncope, weakness, light-headedness and headaches.   Hematological: Negative for adenopathy.       Social History     Socioeconomic History   • Marital status:      Spouse name: Not on file   • Number of children: Not on file   • Years of education: Not on file   • Highest education level: Not on file   Tobacco Use   • Smoking status: Current Every Day Smoker     Packs/day: 1.00     Types: Cigarettes   • Smokeless tobacco: Never Used   Substance and Sexual Activity   • Alcohol use: Yes     Comment: occ   • Drug use: No   • Sexual activity: Defer       Family History   Problem Relation Age of Onset   • Alzheimer's disease Other    • ADD / ADHD Other    • Depression Other    • Diabetes Other    • Migraines Other              Objective     /74 (BP Location: Right arm, Patient Position: Sitting)   Pulse 84   Temp 98.4 °F (36.9 °C) (Tympanic)   Resp 18   Ht 162.6 cm (64\")   Wt 86.2 kg (190 lb)   SpO2 " 98%   BMI 32.61 kg/m²     Physical Exam   Constitutional: She appears well-developed and well-nourished.   HENT:   Head: Normocephalic and atraumatic.   Eyes: Conjunctivae and EOM are normal.   Neck: Normal range of motion. Neck supple. No thyromegaly present.   Cardiovascular: Normal rate, regular rhythm and normal heart sounds. Exam reveals no gallop and no friction rub.   No murmur heard.  Pulmonary/Chest: Effort normal and breath sounds normal. No respiratory distress. She has no wheezes. She has no rales.   Abdominal: Soft. She exhibits no distension and no mass. There is no tenderness. There is no rebound and no guarding.   Musculoskeletal: Normal range of motion.   Lymphadenopathy:     She has no cervical adenopathy.   Neurological: She is alert. She has normal strength. She displays no tremor and normal reflexes. No sensory deficit. She exhibits normal muscle tone. Coordination normal. She displays no Babinski's sign on the right side. She displays no Babinski's sign on the left side.   Reflex Scores:       Tricep reflexes are 2+ on the right side and 2+ on the left side.       Bicep reflexes are 2+ on the right side and 2+ on the left side.       Brachioradialis reflexes are 2+ on the right side and 2+ on the left side.       Patellar reflexes are 2+ on the right side and 2+ on the left side.       Achilles reflexes are 2+ on the right side and 2+ on the left side.  CN II: Visual fields intact  CN III,IV,VI: extraocular movements intact  CN V: Masseter strength and sensation in all three divisions intact  CN VII: Smile and eyelid closure symmetrical  CN VIII: Hearing intact  CN IX and X: Voice and palate movement intact  CN XI: Shoulder shrug intact  CN XII: Tongue protrusion and movement intact   Skin: Skin is warm and dry. No rash noted. No erythema.   Nursing note and vitals reviewed.      Dystonia/Tardive Dyskinesia  Absent  Meningeal Signs  Absent    Diagnostic Studies  CBC, CMP,TSH, UDS,  acetaminophen level, salicylate level, ethanol level, U/A all normal except    CMP normal except for slightly decreased CO2 at 20 and increased chloride at 109.  Labs done May 2 of 2019 shows a hemoglobin A1c of 5.6 and normal TSH, total T4, and free T4.  Iron studies were all normal.    This admission serum pregnancy test is negative, CBC is all normal, acetaminophen less than 5, salicylate less than 0.3, less than 10.  Urinalysis is normal except for 2+ blood 1+ leukocytes, negative nitrite, 13-20 red cells plus bacteria and 13-20 epithelial cells.    EKG done on August 21 at almost 9 AM shows:  Vent. Rate : 074 BPM     Atrial Rate : 074 BPM     P-R Int : 148 ms          QRS Dur : 084 ms      QT Int : 394 ms       P-R-T Axes : 030 068 007 degrees     QTc Int : 437 ms    Normal sinus rhythm  ST abnormality, possible digitalis effect  Abnormal ECG  When compared with ECG of 12-SEP-2016 06:14,  No significant change was found      Assessment/Plan     Past Medical History:   Diagnosis Date   • Abdominal pain    • Acute bronchitis    • Acute upper respiratory infection    • Anxiety state    • Backache    • Bell's palsy    • Cellulitis      left breast      • Chest wall pain     right lateral chest wall-poss early shingles      • Chest wall pain 10/21/2015    right lateral chest wall-poss early shingles      • Chronic low back pain     May 13 2014 MRI ddd with mild left neuroforminal narrowing l4-l5      • Chronic retention of urine     ACUTE   • Cough    • Depressive disorder    • Dysuria    • Facial hemiparesis (CMS/HCC)    • Facial hemiparesis (CMS/HCC)    • Fatigue    • Fatigue    • Fever    • Ganglion cyst     LEFT BREAST   • Generalized anxiety disorder    • Headache    • Insomnia    • Migraine    • Pain and other symptoms associated with female genital organs    • Pain radiating to back     LUMBAR   • Postnasal drip    • Retention of urine    • Schizophrenia (CMS/HCC)    • Skin sensation disturbance    • Urinary  incontinence    • Urinary tract infectious disease    • Viral gastroenteritis    • Vulvovaginitis      Alkalosis, mild.  Just encourage normal fluid intake here.    GERD by history.      Continue Home Meds as ordered. Mental health and pain issues managed per psychiatry.  Further diagnostic studies or intervention based on hospital course.

## 2019-08-21 NOTE — NURSING NOTE
Behavior   Note any precipitants to event or behavior   Describe level and action of any aggressive behavior or speech and associated interventions.     Anxiety: Excess Worry and Restless/Edgy  Depression: depressed mood and hopelessness  Pain  5  AVH   yes   S/I   no  Plan  no  H/I   no  Plan  no    Affect   flat      Note:Pt in room, lying in bed. Responded when name called. Reports calling her  at home thinking spies were out to get him, hearing a telephone ring non stop in the past few days but not at present time. Requested something for feelings of anxiousness and pain. PRN Vistaril and Norco given as ordered.Medications given. Will continue to monitor.    Intervention    PRN medication utilized:  yes - Vistaril for anxiousness.    Instructed in medication usage and effects  Medications administered as ordered  Encouraged to verbalize needs      Response    Verbalized understanding   Did patient take medications as ordered yes   Did patient interact with assessment?  yes     Plan    Will monitor for safety  Will monitor every 15 minutes as ordered  Will evaluate and promote the plan of care    Last BM:  August 20, 2019  (Please chart in I/O as well)

## 2019-08-21 NOTE — PLAN OF CARE
Problem: Patient Care Overview  Goal: Plan of Care Review  Outcome: Ongoing (interventions implemented as appropriate)   08/21/19 1005   Coping/Psychosocial   Plan of Care Reviewed With patient   Coping/Psychosocial   Patient Agreement with Plan of Care agrees   Plan of Care Review   Progress no change     Goal: Individualization and Mutuality  Outcome: Ongoing (interventions implemented as appropriate)    Goal: Discharge Needs Assessment  Outcome: Ongoing (interventions implemented as appropriate)    Goal: Interprofessional Rounds/Family Conf  Outcome: Ongoing (interventions implemented as appropriate)      Problem: Overarching Goals (Adult)  Goal: Adheres to Safety Considerations for Self and Others  Outcome: Ongoing (interventions implemented as appropriate)    Goal: Optimized Coping Skills in Response to Life Stressors  Outcome: Ongoing (interventions implemented as appropriate)    Goal: Develops/Participates in Therapeutic [place] to Support Successful Transition  Outcome: Ongoing (interventions implemented as appropriate)      Problem: Mood Impairment (Anxiety Signs/Symptoms) (Adult)  Goal: Improved Mood Symptoms (Anxiety Signs/Symptoms)  Outcome: Ongoing (interventions implemented as appropriate)      Problem: Cognitive Impairment (Anxiety Signs/Symptoms) (Adult)  Goal: Improved Cognitive Function (Anxiety Signs/Symptoms)  Outcome: Ongoing (interventions implemented as appropriate)      Problem: Sensory Perception Impairment (Psychotic Signs/Symptoms) (Pediatric)  Goal: Decrease Frequency/Intensity of Sensory Symptoms(Psychotic Signs/Symptoms)  Outcome: Ongoing (interventions implemented as appropriate)      Problem: Mental State/Mood Impairment (Psychotic Signs/Symptoms) (Pediatric)  Goal: Improved Mental State/Mood (Psychotic Signs/Symptoms)  Outcome: Ongoing (interventions implemented as appropriate)      Problem: Pain, Chronic (Adult)  Goal: Acceptable Pain/Comfort Level and Functional Ability  Outcome:  Ongoing (interventions implemented as appropriate)      Problem: Mood Impairment (Depressive Signs/Symptoms) (Adult)  Goal: Improved Mood Symptoms (Depressive Signs/Symptoms)  Outcome: Ongoing (interventions implemented as appropriate)      Problem: Decreased Participation/Engagement (Depressive Signs/Symptoms) (Adult)  Goal: Increased Participation/Engagement (Depressive Signs/Symptoms)  Outcome: Ongoing (interventions implemented as appropriate)
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
## 2019-08-22 LAB
25(OH)D3 SERPL-MCNC: 24 NG/ML (ref 30–100)
VIT B12 BLD-MCNC: 298 PG/ML (ref 211–946)

## 2019-08-22 PROCEDURE — 90833 PSYTX W PT W E/M 30 MIN: CPT | Performed by: PSYCHIATRY & NEUROLOGY

## 2019-08-22 PROCEDURE — 99232 SBSQ HOSP IP/OBS MODERATE 35: CPT | Performed by: PSYCHIATRY & NEUROLOGY

## 2019-08-22 RX ORDER — LANOLIN ALCOHOL/MO/W.PET/CERES
1000 CREAM (GRAM) TOPICAL DAILY
Status: DISCONTINUED | OUTPATIENT
Start: 2019-08-22 | End: 2019-08-24 | Stop reason: HOSPADM

## 2019-08-22 RX ORDER — DULOXETIN HYDROCHLORIDE 20 MG/1
60 CAPSULE, DELAYED RELEASE ORAL DAILY
Status: DISCONTINUED | OUTPATIENT
Start: 2019-08-23 | End: 2019-08-24 | Stop reason: HOSPADM

## 2019-08-22 RX ORDER — CLONAZEPAM 0.5 MG/1
0.5 TABLET ORAL 3 TIMES DAILY
Status: DISCONTINUED | OUTPATIENT
Start: 2019-08-22 | End: 2019-08-22

## 2019-08-22 RX ORDER — DULOXETIN HYDROCHLORIDE 30 MG/1
30 CAPSULE, DELAYED RELEASE ORAL DAILY
Status: COMPLETED | OUTPATIENT
Start: 2019-08-22 | End: 2019-08-22

## 2019-08-22 RX ORDER — MELATONIN
2000 DAILY
Status: DISCONTINUED | OUTPATIENT
Start: 2019-08-22 | End: 2019-08-24 | Stop reason: HOSPADM

## 2019-08-22 RX ORDER — CLONAZEPAM 0.5 MG/1
0.5 TABLET ORAL 3 TIMES DAILY
Status: DISCONTINUED | OUTPATIENT
Start: 2019-08-22 | End: 2019-08-24 | Stop reason: HOSPADM

## 2019-08-22 RX ORDER — BENZTROPINE MESYLATE 1 MG/1
1 TABLET ORAL NIGHTLY
Status: DISCONTINUED | OUTPATIENT
Start: 2019-08-22 | End: 2019-08-24 | Stop reason: HOSPADM

## 2019-08-22 RX ADMIN — CLONAZEPAM 0.5 MG: 0.5 TABLET ORAL at 20:18

## 2019-08-22 RX ADMIN — HYDROCODONE BITARTRATE AND ACETAMINOPHEN 1 TABLET: 7.5; 325 TABLET ORAL at 20:18

## 2019-08-22 RX ADMIN — DULOXETINE 30 MG: 30 CAPSULE, DELAYED RELEASE ORAL at 15:19

## 2019-08-22 RX ADMIN — TOPIRAMATE 100 MG: 100 TABLET, FILM COATED ORAL at 21:36

## 2019-08-22 RX ADMIN — CYCLOBENZAPRINE HYDROCHLORIDE 10 MG: 10 TABLET, FILM COATED ORAL at 20:18

## 2019-08-22 RX ADMIN — NICOTINE 1 PATCH: 21 PATCH, EXTENDED RELEASE TRANSDERMAL at 08:10

## 2019-08-22 RX ADMIN — VITAMIN D, TAB 1000IU (100/BT) 2000 UNITS: 25 TAB at 17:03

## 2019-08-22 RX ADMIN — BENZTROPINE MESYLATE 1 MG: 1 TABLET ORAL at 20:18

## 2019-08-22 RX ADMIN — CYANOCOBALAMIN TAB 1000 MCG 1000 MCG: 1000 TAB at 15:12

## 2019-08-22 RX ADMIN — CYCLOBENZAPRINE HYDROCHLORIDE 10 MG: 10 TABLET, FILM COATED ORAL at 15:13

## 2019-08-22 RX ADMIN — FAMOTIDINE 20 MG: 20 TABLET ORAL at 17:03

## 2019-08-22 RX ADMIN — HYDROCODONE BITARTRATE AND ACETAMINOPHEN 1 TABLET: 7.5; 325 TABLET ORAL at 08:15

## 2019-08-22 RX ADMIN — CLONAZEPAM 0.5 MG: 0.5 TABLET ORAL at 15:18

## 2019-08-22 RX ADMIN — FAMOTIDINE 20 MG: 20 TABLET ORAL at 08:10

## 2019-08-22 RX ADMIN — CELECOXIB 200 MG: 200 CAPSULE ORAL at 08:10

## 2019-08-22 RX ADMIN — HALOPERIDOL 10 MG: 5 TABLET ORAL at 20:18

## 2019-08-22 RX ADMIN — FLUOXETINE 20 MG: 20 CAPSULE ORAL at 08:10

## 2019-08-22 RX ADMIN — CLONAZEPAM 1 MG: 0.5 TABLET ORAL at 10:03

## 2019-08-22 RX ADMIN — CYCLOBENZAPRINE HYDROCHLORIDE 10 MG: 10 TABLET, FILM COATED ORAL at 08:19

## 2019-08-22 RX ADMIN — HYDROXYZINE PAMOATE 50 MG: 50 CAPSULE ORAL at 13:22

## 2019-08-22 RX ADMIN — TOPIRAMATE 100 MG: 100 TABLET, FILM COATED ORAL at 08:10

## 2019-08-22 NOTE — PLAN OF CARE
Problem: Sensory Perception Impairment (Psychotic Signs/Symptoms) (Pediatric)  Goal: Decrease Frequency/Intensity of Sensory Symptoms(Psychotic Signs/Symptoms)  Outcome: Ongoing (interventions implemented as appropriate)      Problem: Mental State/Mood Impairment (Psychotic Signs/Symptoms) (Pediatric)  Goal: Improved Mental State/Mood (Psychotic Signs/Symptoms)  Outcome: Ongoing (interventions implemented as appropriate)      Problem: Pain, Chronic (Adult)  Goal: Acceptable Pain/Comfort Level and Functional Ability  Outcome: Ongoing (interventions implemented as appropriate)      Problem: Mood Impairment (Depressive Signs/Symptoms) (Adult)  Goal: Improved Mood Symptoms (Depressive Signs/Symptoms)  Outcome: Ongoing (interventions implemented as appropriate)      Problem: Decreased Participation/Engagement (Depressive Signs/Symptoms) (Adult)  Goal: Increased Participation/Engagement (Depressive Signs/Symptoms)  Outcome: Ongoing (interventions implemented as appropriate)

## 2019-08-22 NOTE — PROGRESS NOTES
"8/22/2019    Chief Complaint: hallucinations, anxiety and depression    Subjective:  Patient is a 39 y.o. female who was hospitalized for hallucinations, anxiety and depression.       Patient notes that she had a bad conversation with her dad over the phone.  They were talking about her visit there and he made off hand remark that he was surprised that she remembered the visit considering how \"stoned\" she was on all her meds.  She states that she realizes that it was just a poor choice of words on her dad's part but it was still hurtful b/c she felt that she was on medications due to conditions that she was struggling with.  She notes visit with her  last night went well.  She finds him supportive and would like to have a meeting with him at discharge.    She has been on zoloft for months and it did not help.  Lexapro and celexa did not help either.  Does not recall paxil.  She has been on cymbalta and it helped for a while but then it stopped.  She notes it was helpful for her fibromyalgia and she does not recall if there were any reasons for why she felt it stopped working.    She notes that she continues to have \"bobbing\" movements of her head.  She notes her thoughts feel clearer since increasing the haldol.  She notes her AVH has been better since being on the unit.    Objective     Vital Signs    Temp:  [98 °F (36.7 °C)-98.6 °F (37 °C)] 98 °F (36.7 °C)  Heart Rate:  [61-87] 61  Resp:  [16] 16  BP: (108-121)/(57-67) 108/57    Physical Exam:   General Appearance: alert, appears stated age and cooperative,  Hygiene:   good  Gait & Station: slow with limp due to old fracture  Musculoskeletal: No tremors or abnormal involuntary movements    Mental Status Exam:   Cooperation:  Cooperative  Eye Contact:  Good  Psychomotor Behavior:  Appropriate  Mood: Sad/Depressed  Affect:  mood-congruent  Speech:  Normal  Thought Process:  Coherent  Associations: Goal Directed  Thought Content:     Normal   Suicidal:  " None   Homicidal:  None   Hallucinations:  Not demonstrated today   Delusion:  None expressed today  Cognitive Functioning:   Consciousness: awake, alert and oriented  Reliability:  good  Insight:  Fair  Judgement:  Fair  Impulse Control:  Fair    Lab Results (last 24 hours)     Procedure Component Value Units Date/Time    Vitamin B12 [[id number]]  (Normal) Collected:  08/20/19 1302    Specimen:  Blood from Arm, Left Updated:  08/22/19 0116     Vitamin B-12 298 pg/mL     Vitamin D 25 Hydroxy [[id number]]  (Abnormal) Collected:  08/21/19 0612    Specimen:  Blood Updated:  08/22/19 0114     25 Hydroxy, Vitamin D 24.0 ng/ml     Narrative:       Reference Range for Total Vitamin D 25(OH)     Deficiency <20.0 ng/mL   Insufficiency 21-29 ng/mL   Sufficiency  ng/mL  Toxicity >100 ng/ml        Imaging Results (last 24 hours)     ** No results found for the last 24 hours. **          Medicine:   Current Facility-Administered Medications:   •  celecoxib (CeleBREX) capsule 200 mg, 200 mg, Oral, Daily, Jere Frazier MD, 200 mg at 08/22/19 0810  •  clonazePAM (KlonoPIN) tablet 1 mg, 1 mg, Oral, TID PRN, Jere Frazier MD, 1 mg at 08/22/19 1003  •  CloNIDine (CATAPRES) tablet 0.1 mg, 0.1 mg, Oral, Q4H PRN, Jere Frazier MD  •  cyclobenzaprine (FLEXERIL) tablet 10 mg, 10 mg, Oral, TID, Jere Frazier MD, 10 mg at 08/22/19 0819  •  famotidine (PEPCID) tablet 20 mg, 20 mg, Oral, BID AC, Jere Frazier MD, 20 mg at 08/22/19 0810  •  FLUoxetine (PROzac) capsule 20 mg, 20 mg, Oral, Daily, Jere Frazier MD, 20 mg at 08/22/19 0810  •  [COMPLETED] haloperidol (HALDOL) tablet 5 mg, 5 mg, Oral, Nightly, 5 mg at 08/21/19 2035 **FOLLOWED BY** haloperidol (HALDOL) tablet 10 mg, 10 mg, Oral, Nightly, Jere Frazier MD  •  HYDROcodone-acetaminophen (NORCO) 7.5-325 MG per tablet 1 tablet, 1 tablet, Oral, Q6H PRN, Jere Frazier MD, 1 tablet at 08/22/19 0815  •  hydrOXYzine pamoate (VISTARIL) capsule  50 mg, 50 mg, Oral, Q6H PRN, Jere Frazier MD, 50 mg at 08/22/19 1322  •  loperamide (IMODIUM) capsule 2 mg, 2 mg, Oral, Q2H PRN, Jere Frazier MD  •  magnesium hydroxide (MILK OF MAGNESIA) suspension 2400 mg/10mL 10 mL, 10 mL, Oral, Daily PRN, Jere Frazier MD  •  nicotine (NICODERM CQ) 21 MG/24HR patch 1 patch, 1 patch, Transdermal, Q24H, Jere Frazier MD, 1 patch at 08/22/19 0810  •  SUMAtriptan (IMITREX) tablet 50 mg, 50 mg, Oral, Q2H PRN, Jere Frazier MD  •  topiramate (TOPAMAX) tablet 100 mg, 100 mg, Oral, Q12H, Jere Frazier MD, 100 mg at 08/22/19 0810  •  traZODone (DESYREL) tablet 50 mg, 50 mg, Oral, Nightly PRN, Jere Frazier MD  •  vitamin B-12 (CYANOCOBALAMIN) tablet 1,000 mcg, 1,000 mcg, Oral, Daily, Jere Frazier MD    Diagnoses/Assessment:     Schizophrenia (CMS/HCC)    Severe episode of recurrent major depressive disorder, with psychotic features (CMS/HCC)    Post traumatic stress disorder (PTSD)      Treatment Plan:    1) Will continue care for the patient on the behavioral health unit at [facility] to ensure patient safety.  2) Will continue to provide treatment with the unit milieu, activities, therapies and psychopharmacological management.  3) Patient to be placed on or continued on  Q15 minute checks  and Suicide precautions.  4) Pertinent medical issues: low b-12: start oral supplementation (level 298 now but was 166 in May, unclear why it improved as she had no supplementation before.); Low vit D: supplement  5) Will order following labs: reviewed  6) Will make the following medication changes: Will continue the haldol 10mg qhs.  Will start cymbalta to 60mg daily and stop the prozac.  Will give cogentin 1mg qhs for EPS.  Will give klonopin 0.5mg tid and continue the prn due to continued anxiety.  7) Will continue discharge planning as appropriate for patient.  8) Psychotherapy provided: see below    Treatment plan and  medication risks and benefits discussed with: Patient    Jere Frazier MD  08/22/19  2:30 PM     Psychotherapy Note  --Total Psychotherapy Time: 20 minutes  --Participants: Patient, myself  --Focus of Therapeutic Encounter: depression, anxiety and hallucinations; maladaptive coping skills; maladaptive schema  --Intervention Type: supportive, cognitive and psychoeducation  --Therapy notes: I provided reflective listening, supportive therapy, reflection, and allowed them to express affect in therapy course.  Discussed her reactions to conversation with dad.  Helped provide some cognitive reframe.  Discussed illness, medication.  Discussed social circumstance.  --DX: as above  --Plan: Continue to work on developing & strengthening coping skills; correcting maladaptive schema  --Post therapy status: improving affect and understanding      Jere Frazier MD  08/22/19 @ 3:36 PM

## 2019-08-22 NOTE — NURSING NOTE
Behavior   Note any precipitants to event or behavior   Describe level and action of any aggressive behavior or speech and associated interventions.     Anxiety: Excess Worry  Depression: depressed mood and hopelessness  Pain  0  AVH   no  S/I   no  Plan  no  H/I   no  Plan  no    Affect   flat      Note:Pt in dining area playing cards with peers.Denies SI/HI/AVh at this time. Reports feelings of anxious but decline need for medication at this time. Will continue to monitor.      Intervention    PRN medication utilized:  no    Instructed in medication usage and effects  Medications administered as ordered  Encouraged to verbalize needs      Response    Verbalized understanding   Did patient take medications as ordered yes   Did patient interact with assessment?  yes     Plan    Will monitor for safety  Will monitor every 15 minutes as ordered  Will evaluate and promote the plan of care    Last BM:  unknown date  (Please chart in I/O as well)

## 2019-08-22 NOTE — NURSING NOTE
"Pt noted to be tearful on the phone this morning. After phone call she asked for a Klonopin for her nerves. I asked her what was wrong. She would only say \"I had an upsetting phone call with my mother\". Medication provided as ordered. Will monitor for results.   "

## 2019-08-22 NOTE — NURSING NOTE
Behavior   Note any precipitants to event or behavior   Describe level and action of any aggressive behavior or speech and associated interventions.     Anxiety: Patient denies at this time  Depression: Patient denies at this time  Pain  0  AVH   no  S/I   no  Plan  no  H/I   no  Plan  no    Affect   flat      Note:Pt is alert, oriented x3 verbal and ambulatory. Appropriate interaction with staff and peers. Took medication with no problem. Cooperative and complaint. Denies any needs at this time. Will continue to monitor and provide a safe environment.       Intervention    PRN medication utilized:  no    Instructed in medication usage and effects  Medications administered as ordered  Encouraged to verbalize needs      Response    Verbalized understanding   Did patient take medications as ordered yes   Did patient interact with assessment?  yes     Plan    Will monitor for safety  Will monitor every 15 minutes as ordered  Will evaluate and promote the plan of care    Last BM:  unknown date  (Please chart in I/O as well)

## 2019-08-22 NOTE — PLAN OF CARE
Problem: Patient Care Overview  Goal: Plan of Care Review  Outcome: Ongoing (interventions implemented as appropriate)   08/21/19 1546   Coping/Psychosocial   Plan of Care Reviewed With patient   Coping/Psychosocial   Patient Agreement with Plan of Care agrees     Goal: Individualization and Mutuality  Outcome: Ongoing (interventions implemented as appropriate)    Goal: Discharge Needs Assessment  Outcome: Ongoing (interventions implemented as appropriate)    Goal: Interprofessional Rounds/Family Conf  Outcome: Ongoing (interventions implemented as appropriate)      Problem: Overarching Goals (Adult)  Goal: Adheres to Safety Considerations for Self and Others  Outcome: Ongoing (interventions implemented as appropriate)    Goal: Optimized Coping Skills in Response to Life Stressors  Outcome: Ongoing (interventions implemented as appropriate)    Goal: Develops/Participates in Therapeutic [place] to Support Successful Transition  Outcome: Ongoing (interventions implemented as appropriate)      Problem: Mood Impairment (Anxiety Signs/Symptoms) (Adult)  Goal: Improved Mood Symptoms (Anxiety Signs/Symptoms)  Outcome: Ongoing (interventions implemented as appropriate)      Problem: Cognitive Impairment (Anxiety Signs/Symptoms) (Adult)  Goal: Improved Cognitive Function (Anxiety Signs/Symptoms)  Outcome: Ongoing (interventions implemented as appropriate)      Problem: Sensory Perception Impairment (Psychotic Signs/Symptoms) (Pediatric)  Goal: Decrease Frequency/Intensity of Sensory Symptoms(Psychotic Signs/Symptoms)  Outcome: Ongoing (interventions implemented as appropriate)      Problem: Mental State/Mood Impairment (Psychotic Signs/Symptoms) (Pediatric)  Goal: Improved Mental State/Mood (Psychotic Signs/Symptoms)  Outcome: Ongoing (interventions implemented as appropriate)      Problem: Pain, Chronic (Adult)  Goal: Acceptable Pain/Comfort Level and Functional Ability  Outcome: Ongoing (interventions implemented as  appropriate)      Problem: Mood Impairment (Depressive Signs/Symptoms) (Adult)  Goal: Improved Mood Symptoms (Depressive Signs/Symptoms)  Outcome: Ongoing (interventions implemented as appropriate)      Problem: Decreased Participation/Engagement (Depressive Signs/Symptoms) (Adult)  Goal: Increased Participation/Engagement (Depressive Signs/Symptoms)  Outcome: Ongoing (interventions implemented as appropriate)

## 2019-08-23 PROCEDURE — 99232 SBSQ HOSP IP/OBS MODERATE 35: CPT | Performed by: NURSE PRACTITIONER

## 2019-08-23 RX ADMIN — CLONAZEPAM 0.5 MG: 0.5 TABLET ORAL at 15:46

## 2019-08-23 RX ADMIN — CLONAZEPAM 1 MG: 0.5 TABLET ORAL at 10:54

## 2019-08-23 RX ADMIN — CYCLOBENZAPRINE HYDROCHLORIDE 10 MG: 10 TABLET, FILM COATED ORAL at 15:46

## 2019-08-23 RX ADMIN — CYCLOBENZAPRINE HYDROCHLORIDE 10 MG: 10 TABLET, FILM COATED ORAL at 20:34

## 2019-08-23 RX ADMIN — TOPIRAMATE 100 MG: 100 TABLET, FILM COATED ORAL at 08:38

## 2019-08-23 RX ADMIN — HYDROCODONE BITARTRATE AND ACETAMINOPHEN 1 TABLET: 7.5; 325 TABLET ORAL at 15:46

## 2019-08-23 RX ADMIN — TOPIRAMATE 100 MG: 100 TABLET, FILM COATED ORAL at 20:34

## 2019-08-23 RX ADMIN — BENZTROPINE MESYLATE 1 MG: 1 TABLET ORAL at 20:33

## 2019-08-23 RX ADMIN — TRAZODONE HYDROCHLORIDE 50 MG: 50 TABLET ORAL at 20:34

## 2019-08-23 RX ADMIN — CYCLOBENZAPRINE HYDROCHLORIDE 10 MG: 10 TABLET, FILM COATED ORAL at 08:39

## 2019-08-23 RX ADMIN — CYANOCOBALAMIN TAB 1000 MCG 1000 MCG: 1000 TAB at 08:38

## 2019-08-23 RX ADMIN — DULOXETINE HYDROCHLORIDE 60 MG: 30 CAPSULE, DELAYED RELEASE ORAL at 08:39

## 2019-08-23 RX ADMIN — SUMATRIPTAN SUCCINATE 50 MG: 50 TABLET ORAL at 08:38

## 2019-08-23 RX ADMIN — FAMOTIDINE 20 MG: 20 TABLET ORAL at 17:38

## 2019-08-23 RX ADMIN — HYDROCODONE BITARTRATE AND ACETAMINOPHEN 1 TABLET: 7.5; 325 TABLET ORAL at 06:36

## 2019-08-23 RX ADMIN — CLONAZEPAM 0.5 MG: 0.5 TABLET ORAL at 08:38

## 2019-08-23 RX ADMIN — CELECOXIB 200 MG: 200 CAPSULE ORAL at 08:39

## 2019-08-23 RX ADMIN — VITAMIN D, TAB 1000IU (100/BT) 2000 UNITS: 25 TAB at 08:38

## 2019-08-23 RX ADMIN — CLONAZEPAM 0.5 MG: 0.5 TABLET ORAL at 20:33

## 2019-08-23 RX ADMIN — HYDROCODONE BITARTRATE AND ACETAMINOPHEN 1 TABLET: 7.5; 325 TABLET ORAL at 21:33

## 2019-08-23 RX ADMIN — NICOTINE 1 PATCH: 21 PATCH, EXTENDED RELEASE TRANSDERMAL at 08:42

## 2019-08-23 RX ADMIN — FAMOTIDINE 20 MG: 20 TABLET ORAL at 08:40

## 2019-08-23 RX ADMIN — HALOPERIDOL 10 MG: 5 TABLET ORAL at 20:33

## 2019-08-23 RX ADMIN — CLONAZEPAM 1 MG: 0.5 TABLET ORAL at 18:45

## 2019-08-23 NOTE — NURSING NOTE
Behavior   Note any precipitants to event or behavior   Describe level and action of any aggressive behavior or speech and associated interventions.     Anxiety: Patient denies at this time  Depression: Patient denies at this time  Pain  0  AVH   no  S/I   no  Plan  no  H/I   no  Plan  no    Affect   flat    Note: Patient sitting in dining area playing a game with peers. Patient is pleasant and cooperative. Pt is compliant with medications. Pt denies S/I, H/I and AVH.      Intervention    PRN medication utilized:  yes - Norco    Instructed in medication usage and effects  Medications administered as ordered  Encouraged to verbalize needs      Response    Verbalized understanding   Did patient take medications as ordered yes   Did patient interact with assessment?  yes     Plan    Will monitor for safety  Will monitor every 15 minutes as ordered  Will evaluate and promote the plan of care    Last BM:    (Please chart in I/O as well)

## 2019-08-23 NOTE — PROGRESS NOTES
8/23/2019    Chief Complaint: psychosis and hallucinations    Subjective:    Ms. Chris Valdez is a 39 yr old female on the adult U. Today she is reporting that she is clear from voices and that the medication is working well. Her  should be here around 9 am for a family session and then plan for discharge afterwards.   Chris reports having a headache today related to the weather. She is alert/oriented, logical and able to carry a reality based conversation.       Objective     Vital Signs    Temp:  [99.6 °F (37.6 °C)] 99.6 °F (37.6 °C)  Heart Rate:  [83] 83  Resp:  [18] 18  BP: (98)/(67) 98/67    Physical Exam:   General Appearance: alert, appears stated age and cooperative,  Hygiene:   good  Gait & Station: Normal  Musculoskeletal: No tremors or abnormal involuntary movements    Mental Status Exam:   Cooperation:  Cooperative  Eye Contact:  Good  Psychomotor Behavior:  Appropriate  Mood: Euthymic  Affect:  normal  Speech:  Normal  Thought Process:  Coherent  Associations: Goal Directed  Thought Content:     Normal   Suicidal:  None   Homicidal:  None   Hallucinations:  Not demonstrated today   Delusion:  Unable to demonstrate  Cognitive Functioning:   Consciousness: awake and alert  Reliability:  fair  Insight:  Fair  Judgement:  Good  Impulse Control:  Fair    Lab Results (last 24 hours)     ** No results found for the last 24 hours. **        Imaging Results (last 24 hours)     ** No results found for the last 24 hours. **          Medicine:   Current Facility-Administered Medications:   •  benztropine (COGENTIN) tablet 1 mg, 1 mg, Oral, Nightly, Jere Frazier MD, 1 mg at 08/22/19 2018  •  celecoxib (CeleBREX) capsule 200 mg, 200 mg, Oral, Daily, Jere Frazier MD, 200 mg at 08/23/19 0839  •  cholecalciferol (VITAMIN D3) tablet 2,000 Units, 2,000 Units, Oral, Daily, Jere Frazier MD, 2,000 Units at 08/23/19 0838  •  clonazePAM (KlonoPIN) tablet 0.5 mg, 0.5 mg, Oral, TID, Freddie  MD Jere, 0.5 mg at 08/23/19 1546  •  clonazePAM (KlonoPIN) tablet 1 mg, 1 mg, Oral, TID PRN, Jere Frazier MD, 1 mg at 08/23/19 1054  •  CloNIDine (CATAPRES) tablet 0.1 mg, 0.1 mg, Oral, Q4H PRN, Jere Frazier MD  •  cyclobenzaprine (FLEXERIL) tablet 10 mg, 10 mg, Oral, TID, Jere Frazier MD, 10 mg at 08/23/19 1546  •  [COMPLETED] DULoxetine (CYMBALTA) DR capsule 30 mg, 30 mg, Oral, Daily, 30 mg at 08/22/19 1519 **FOLLOWED BY** DULoxetine (CYMBALTA) DR capsule 60 mg, 60 mg, Oral, Daily, Jere Frazier MD, 60 mg at 08/23/19 0839  •  famotidine (PEPCID) tablet 20 mg, 20 mg, Oral, BID AC, Jere Frazier MD, 20 mg at 08/23/19 0840  •  [COMPLETED] haloperidol (HALDOL) tablet 5 mg, 5 mg, Oral, Nightly, 5 mg at 08/21/19 2035 **FOLLOWED BY** haloperidol (HALDOL) tablet 10 mg, 10 mg, Oral, Nightly, Jere Frazier MD, 10 mg at 08/22/19 2018  •  HYDROcodone-acetaminophen (NORCO) 7.5-325 MG per tablet 1 tablet, 1 tablet, Oral, Q6H PRN, Jere Frazier MD, 1 tablet at 08/23/19 1546  •  hydrOXYzine pamoate (VISTARIL) capsule 50 mg, 50 mg, Oral, Q6H PRN, Jere Frazier MD, 50 mg at 08/22/19 1322  •  loperamide (IMODIUM) capsule 2 mg, 2 mg, Oral, Q2H PRN, Jere Frazier MD  •  magnesium hydroxide (MILK OF MAGNESIA) suspension 2400 mg/10mL 10 mL, 10 mL, Oral, Daily PRN, Jere Frazier MD  •  nicotine (NICODERM CQ) 21 MG/24HR patch 1 patch, 1 patch, Transdermal, Q24H, Jere Frazier MD, 1 patch at 08/23/19 0842  •  SUMAtriptan (IMITREX) tablet 50 mg, 50 mg, Oral, Q2H PRN, Jere Frazier MD, 50 mg at 08/23/19 0838  •  topiramate (TOPAMAX) tablet 100 mg, 100 mg, Oral, Q12H, Jere Frazier MD, 100 mg at 08/23/19 0838  •  traZODone (DESYREL) tablet 50 mg, 50 mg, Oral, Nightly PRN, Jere Frazier MD  •  vitamin B-12 (CYANOCOBALAMIN) tablet 1,000 mcg, 1,000 mcg, Oral, Daily, Jere Frazier MD, 1,000 mcg at 08/23/19 0838    Diagnoses/Assessment:      Schizophrenia (CMS/HCC)    Severe episode of recurrent major depressive disorder, with psychotic features (CMS/HCC)    Post traumatic stress disorder (PTSD)      Treatment Plan:    1) Will continue care for the patient on the behavioral health unit at [facility] to ensure patient safety.  2) Will continue to provide treatment with the unit milieu, activities, therapies and psychopharmacological management.  3) Patient to be placed on or continued on  Q15 minute checks  and Self Injurious Behavior precautions.  4) Pertinent medical issues: low vit b12 and vit d   5) Will order following labs: none  6) Will make the following medication changes:   --Cont Haldol 10 mg qhs  --Cont cymbalta 60 mg daily  --Cont cogentin 1 mg QHS  --Cont Klonopin 0.5 mg TID for anxiety  7) Will continue discharge planning as appropriate for patient.  8) Psychotherapy provided for less than 16 minutes.    Treatment plan and medication risks and benefits discussed with: Patient    ADEOLA Dwyer  08/23/19  4:18 PM

## 2019-08-23 NOTE — NURSING NOTE
"Behavior   Note any precipitants to event or behavior   Describe level and action of any aggressive behavior or speech and associated interventions.     Anxiety: Excess Worry and Decreased sleep  Depression: depressed mood  Pain  3  AVH   yes   S/I   no  Plan  no  H/I   no  Plan  no    Affect   blunted      Note: Patient interacted appropriately with staff. Patient reports having a Migraine this AM. Patient requested Imitrex. Patient reports not sleeping well due to migraine. Patient reports increased anxiety due to speaking with her father yesterday. Patient stated \"I love my dad but he really hurt me yesterday when I was talking to him. He is biologically my step-dad but has been the only dad I have had. My biological dad isn't worth talking about. I went to visit my dad in June. He lives in [place] now. He was a doctor here and had to leave because of the opioid problems. When I was in [facility], they made me detox off of my dilaudid. I had to quit cold turkey. They gave me medication and apparently my dad thought I was snowed when I went to visit him. My dad doesn't understand what I had to go through when I went to [facility]. I don't think he understands schizophrenia. So apparently he thought I didn't remember coming to visit him in June. I have been hearing and seeing things since I was 27. I had my first admission when I was a teenager for depression. I am really glad I came up here. I have had more help and success with figuring out what is wrong with me since I came up here. The things I saw and heard are better and it helps me feel better.\" Patient reports her  is supportive. Patient needs met. Will continue to monitor.      Intervention    PRN medication utilized:  yes - Imitrex    Instructed in medication usage and effects  Medications administered as ordered  Encouraged to verbalize needs      Response    Verbalized understanding   Did patient take medications as ordered yes   Did patient " interact with assessment?  yes     Plan    Will monitor for safety  Will monitor every 15 minutes as ordered  Will evaluate and promote the plan of care    Last BM:  unknown date  (Please chart in I/O as well)

## 2019-08-23 NOTE — NURSING NOTE
"Patient approached the desk to request her PRN Klonopin. Patient states \"I talked to my mom. Apparently my dad was sorry for what he said but it makes me so upset and anxious.\"  "

## 2019-08-23 NOTE — PLAN OF CARE
Problem: Patient Care Overview  Goal: Plan of Care Review  Outcome: Ongoing (interventions implemented as appropriate)    Goal: Individualization and Mutuality  Outcome: Ongoing (interventions implemented as appropriate)    Goal: Discharge Needs Assessment  Outcome: Ongoing (interventions implemented as appropriate)    Goal: Interprofessional Rounds/Family Conf  Outcome: Ongoing (interventions implemented as appropriate)      Problem: Overarching Goals (Adult)  Goal: Adheres to Safety Considerations for Self and Others  Outcome: Ongoing (interventions implemented as appropriate)    Goal: Optimized Coping Skills in Response to Life Stressors  Outcome: Ongoing (interventions implemented as appropriate)    Goal: Develops/Participates in Therapeutic [place] to Support Successful Transition  Outcome: Ongoing (interventions implemented as appropriate)      Problem: Mood Impairment (Anxiety Signs/Symptoms) (Adult)  Goal: Improved Mood Symptoms (Anxiety Signs/Symptoms)  Outcome: Ongoing (interventions implemented as appropriate)      Problem: Cognitive Impairment (Anxiety Signs/Symptoms) (Adult)  Goal: Improved Cognitive Function (Anxiety Signs/Symptoms)  Outcome: Ongoing (interventions implemented as appropriate)      Problem: Sensory Perception Impairment (Psychotic Signs/Symptoms) (Pediatric)  Goal: Decrease Frequency/Intensity of Sensory Symptoms(Psychotic Signs/Symptoms)  Outcome: Ongoing (interventions implemented as appropriate)      Problem: Mental State/Mood Impairment (Psychotic Signs/Symptoms) (Pediatric)  Goal: Improved Mental State/Mood (Psychotic Signs/Symptoms)  Outcome: Ongoing (interventions implemented as appropriate)      Problem: Pain, Chronic (Adult)  Goal: Acceptable Pain/Comfort Level and Functional Ability  Outcome: Ongoing (interventions implemented as appropriate)      Problem: Mood Impairment (Depressive Signs/Symptoms) (Adult)  Goal: Improved Mood Symptoms (Depressive Signs/Symptoms)  Outcome:  Ongoing (interventions implemented as appropriate)      Problem: Decreased Participation/Engagement (Depressive Signs/Symptoms) (Adult)  Goal: Increased Participation/Engagement (Depressive Signs/Symptoms)  Outcome: Ongoing (interventions implemented as appropriate)

## 2019-08-23 NOTE — PLAN OF CARE
Problem: Patient Care Overview  Goal: Plan of Care Review  Outcome: Ongoing (interventions implemented as appropriate)   08/23/19 1101   Coping/Psychosocial   Plan of Care Reviewed With patient   Coping/Psychosocial   Patient Agreement with Plan of Care agrees   Plan of Care Review   Progress improving       Problem: Overarching Goals (Adult)  Goal: Adheres to Safety Considerations for Self and Others  Outcome: Ongoing (interventions implemented as appropriate)   08/23/19 1101   Overarching Goals (Adult)   Adheres to Safety Considerations for Self and Others making progress toward outcome     Goal: Optimized Coping Skills in Response to Life Stressors  Outcome: Ongoing (interventions implemented as appropriate)   08/23/19 1101   Overarching Goals (Adult)   Optimized Coping Skills in Response to Life Stressors making progress toward outcome     Goal: Develops/Participates in Therapeutic [place] to Support Successful Transition  Outcome: Ongoing (interventions implemented as appropriate)   08/23/19 1101   Overarching Goals (Adult)   Develops/Participates in Therapeutic [place] to Support Successful Transition making progress toward outcome       Problem: Mood Impairment (Anxiety Signs/Symptoms) (Adult)  Goal: Improved Mood Symptoms (Anxiety Signs/Symptoms)  Outcome: Ongoing (interventions implemented as appropriate)   08/23/19 1101   Improved Mood Symptoms (Anxiety Signs/Symptoms)   Improved Mood Symptoms Action Step (STG) Outcome making progress toward outcome       Problem: Cognitive Impairment (Anxiety Signs/Symptoms) (Adult)  Goal: Improved Cognitive Function (Anxiety Signs/Symptoms)  Outcome: Ongoing (interventions implemented as appropriate)   08/23/19 1101   Improved Cognitive Function (Anxiety Signs/Symptoms)   Improved Cognitive Ability Action Step (STG) Outcome making progress toward outcome       Problem: Sensory Perception Impairment (Psychotic Signs/Symptoms) (Pediatric)  Goal: Decrease  Frequency/Intensity of Sensory Symptoms(Psychotic Signs/Symptoms)  Outcome: Ongoing (interventions implemented as appropriate)   08/23/19 1101   Decrease Frequency/Intensity of Sensory Symptoms(Psychotic Signs/Symptoms)   Decrease in Sensory Symptom Frequency/Intensity Action Step (STG) Outcome making progress toward outcome       Problem: Mental State/Mood Impairment (Psychotic Signs/Symptoms) (Pediatric)  Goal: Improved Mental State/Mood (Psychotic Signs/Symptoms)  Outcome: Ongoing (interventions implemented as appropriate)   08/23/19 1101   Improved Mental State/Mood (Psychotic Signs/Symptoms)   Improved Mental State/Mood Action Step (STG) Outcome making progress toward outcome       Problem: Mood Impairment (Depressive Signs/Symptoms) (Adult)  Goal: Improved Mood Symptoms (Depressive Signs/Symptoms)  Outcome: Ongoing (interventions implemented as appropriate)   08/23/19 1101   Improved Mood Symptoms (Depressive Signs/Symptoms)   Improved Mood Symptoms Action Step (STG) Outcome making progress toward outcome       Problem: Decreased Participation/Engagement (Depressive Signs/Symptoms) (Adult)  Goal: Increased Participation/Engagement (Depressive Signs/Symptoms)  Outcome: Ongoing (interventions implemented as appropriate)   08/23/19 1101   Increased Participation/Engagement (Depressive Signs/Symptoms)   Increased Participation/Engagement Action Step (STG) Outcome making progress toward outcome

## 2019-08-23 NOTE — PLAN OF CARE
Met with patient and completed Recreation Therapy Assessment.  Patient states that she works full time, takes care of her family and attends college.  She reports that she has little time for leisure activity.  Patient states that she tries to find quiet time or spends time reading her bible to cope with stress.  Patient will be encouraged to engage in and identify relaxing activities to promote stress management.

## 2019-08-23 NOTE — PLAN OF CARE
Problem: Decreased Participation/Engagement (Depressive Signs/Symptoms) (Adult)  Intervention: Facilitate Participation and Engagement   08/23/19 0852   Facilitate Participation and Engagement   Mutually Determined Action Steps (Facilitate Participation and Engagement) voluntarily attends group therapy;participates in one or more activity   Activity   Activity activity encouraged

## 2019-08-24 VITALS
BODY MASS INDEX: 32.44 KG/M2 | DIASTOLIC BLOOD PRESSURE: 56 MMHG | HEIGHT: 64 IN | TEMPERATURE: 100.3 F | HEART RATE: 67 BPM | WEIGHT: 190 LBS | SYSTOLIC BLOOD PRESSURE: 102 MMHG | RESPIRATION RATE: 18 BRPM | OXYGEN SATURATION: 100 %

## 2019-08-24 PROCEDURE — 90833 PSYTX W PT W E/M 30 MIN: CPT | Performed by: PSYCHIATRY & NEUROLOGY

## 2019-08-24 PROCEDURE — 99238 HOSP IP/OBS DSCHRG MGMT 30/<: CPT | Performed by: PSYCHIATRY & NEUROLOGY

## 2019-08-24 RX ORDER — HALOPERIDOL 10 MG/1
10 TABLET ORAL NIGHTLY
Qty: 30 TABLET | Refills: 0 | Status: SHIPPED | OUTPATIENT
Start: 2019-08-24 | End: 2019-11-07

## 2019-08-24 RX ORDER — BENZTROPINE MESYLATE 1 MG/1
1 TABLET ORAL NIGHTLY
Qty: 30 TABLET | Refills: 0 | Status: SHIPPED | OUTPATIENT
Start: 2019-08-24 | End: 2019-08-24

## 2019-08-24 RX ORDER — BENZTROPINE MESYLATE 1 MG/1
1 TABLET ORAL NIGHTLY
Qty: 30 TABLET | Refills: 0 | Status: SHIPPED | OUTPATIENT
Start: 2019-08-24 | End: 2020-01-19 | Stop reason: SDUPTHER

## 2019-08-24 RX ORDER — DULOXETIN HYDROCHLORIDE 60 MG/1
60 CAPSULE, DELAYED RELEASE ORAL DAILY
Qty: 30 CAPSULE | Refills: 0 | Status: SHIPPED | OUTPATIENT
Start: 2019-08-25 | End: 2019-08-24

## 2019-08-24 RX ORDER — HALOPERIDOL 10 MG/1
10 TABLET ORAL NIGHTLY
Qty: 30 TABLET | Refills: 0 | Status: SHIPPED | OUTPATIENT
Start: 2019-08-24 | End: 2019-08-24

## 2019-08-24 RX ORDER — DULOXETIN HYDROCHLORIDE 60 MG/1
60 CAPSULE, DELAYED RELEASE ORAL DAILY
Qty: 30 CAPSULE | Refills: 0 | Status: SHIPPED | OUTPATIENT
Start: 2019-08-25 | End: 2019-11-07

## 2019-08-24 RX ADMIN — FAMOTIDINE 20 MG: 20 TABLET ORAL at 08:17

## 2019-08-24 RX ADMIN — VITAMIN D, TAB 1000IU (100/BT) 2000 UNITS: 25 TAB at 08:18

## 2019-08-24 RX ADMIN — HYDROXYZINE PAMOATE 50 MG: 50 CAPSULE ORAL at 09:15

## 2019-08-24 RX ADMIN — DULOXETINE HYDROCHLORIDE 60 MG: 30 CAPSULE, DELAYED RELEASE ORAL at 08:18

## 2019-08-24 RX ADMIN — NICOTINE 1 PATCH: 21 PATCH, EXTENDED RELEASE TRANSDERMAL at 08:17

## 2019-08-24 RX ADMIN — CELECOXIB 200 MG: 200 CAPSULE ORAL at 08:17

## 2019-08-24 RX ADMIN — CLONAZEPAM 0.5 MG: 0.5 TABLET ORAL at 08:17

## 2019-08-24 RX ADMIN — HYDROCODONE BITARTRATE AND ACETAMINOPHEN 1 TABLET: 7.5; 325 TABLET ORAL at 06:17

## 2019-08-24 RX ADMIN — TOPIRAMATE 100 MG: 100 TABLET, FILM COATED ORAL at 08:17

## 2019-08-24 RX ADMIN — CYCLOBENZAPRINE HYDROCHLORIDE 10 MG: 10 TABLET, FILM COATED ORAL at 08:18

## 2019-08-24 RX ADMIN — CYANOCOBALAMIN TAB 1000 MCG 1000 MCG: 1000 TAB at 08:18

## 2019-08-24 NOTE — NURSING NOTE
Pt sitting at table eating breakfast with peers stated needed extra Klonopin due to increased anxiety
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
## 2019-08-24 NOTE — PLAN OF CARE
Problem: Patient Care Overview  Goal: Plan of Care Review  Outcome: Ongoing (interventions implemented as appropriate)    Goal: Individualization and Mutuality  Outcome: Ongoing (interventions implemented as appropriate)    Goal: Discharge Needs Assessment  Outcome: Ongoing (interventions implemented as appropriate)    Goal: Interprofessional Rounds/Family Conf  Outcome: Ongoing (interventions implemented as appropriate)      Problem: Overarching Goals (Adult)  Goal: Adheres to Safety Considerations for Self and Others  Outcome: Ongoing (interventions implemented as appropriate)    Goal: Optimized Coping Skills in Response to Life Stressors  Outcome: Ongoing (interventions implemented as appropriate)    Goal: Develops/Participates in Therapeutic [place] to Support Successful Transition  Outcome: Ongoing (interventions implemented as appropriate)      Problem: Mood Impairment (Anxiety Signs/Symptoms) (Adult)  Goal: Improved Mood Symptoms (Anxiety Signs/Symptoms)  Outcome: Ongoing (interventions implemented as appropriate)      Problem: Cognitive Impairment (Anxiety Signs/Symptoms) (Adult)  Goal: Improved Cognitive Function (Anxiety Signs/Symptoms)  Outcome: Ongoing (interventions implemented as appropriate)      Problem: Sensory Perception Impairment (Psychotic Signs/Symptoms) (Pediatric)  Goal: Decrease Frequency/Intensity of Sensory Symptoms(Psychotic Signs/Symptoms)  Outcome: Ongoing (interventions implemented as appropriate)      Problem: Mental State/Mood Impairment (Psychotic Signs/Symptoms) (Pediatric)  Goal: Improved Mental State/Mood (Psychotic Signs/Symptoms)  Outcome: Ongoing (interventions implemented as appropriate)      Problem: Pain, Chronic (Adult)  Goal: Acceptable Pain/Comfort Level and Functional Ability  Outcome: Ongoing (interventions implemented as appropriate)      Problem: Mood Impairment (Depressive Signs/Symptoms) (Adult)  Goal: Improved Mood Symptoms (Depressive Signs/Symptoms)  Outcome:  Ongoing (interventions implemented as appropriate)      Problem: Decreased Participation/Engagement (Depressive Signs/Symptoms) (Adult)  Goal: Increased Participation/Engagement (Depressive Signs/Symptoms)  Outcome: Ongoing (interventions implemented as appropriate)

## 2019-08-24 NOTE — NURSING NOTE
Behavior   Note any precipitants to event or behavior   Describe level and action of any aggressive behavior or speech and associated interventions.     Anxiety: Excess Worry and Restless/Edgy  Depression: depressed mood  Pain  0  AVH   no  S/I   no  Plan  no  H/I   no  Plan  no    Affect   labile      Note:pt complains of anxiety stated feel needs extra medicine to get through the day today, stated the depression is not bad but feels the anxiety is better , but continues to feel anxiety, denies SI or HI, will notify writer of needs      Intervention    PRN medication utilized:  no    Instructed in medication usage and effects  Medications administered as ordered  Encouraged to verbalize needs      Response    Verbalized understanding   Did patient take medications as ordered yes   Did patient interact with assessment?  yes     Plan    Will monitor for safety  Will monitor every 15 minutes as ordered  Will evaluate and promote the plan of care    Last BM:  08/23/2019  (Please chart in I/O as well)

## 2019-08-24 NOTE — NURSING NOTE
Behavior   Note any precipitants to event or behavior   Describe level and action of any aggressive behavior or speech and associated interventions.     Anxiety: Excess Worry  Depression: Patient denies at this time  Pain  3  AVH   yes   S/I   no  Plan  no  H/I   no  Plan  no    Affect   flat      Note: Pt is sitting at the round table with peers eating a snack. Flat affect noted. Pt requesting pain medications multiple times before its due. Will continue to monitor.      Intervention    PRN medication utilized:  yes - trazadone    Instructed in medication usage and effects  Medications administered as ordered  Encouraged to verbalize needs      Response    Verbalized understanding   Did patient take medications as ordered yes   Did patient interact with assessment?  yes     Plan    Will monitor for safety  Will monitor every 15 minutes as ordered  Will evaluate and promote the plan of care    Last BM:  8/21/19  (Please chart in I/O as well)

## 2019-08-24 NOTE — PLAN OF CARE
Problem: Patient Care Overview  Goal: Plan of Care Review  Outcome: Ongoing (interventions implemented as appropriate)    Goal: Individualization and Mutuality  Outcome: Ongoing (interventions implemented as appropriate)    Goal: Discharge Needs Assessment  Outcome: Ongoing (interventions implemented as appropriate)    Goal: Interprofessional Rounds/Family Conf  Outcome: Ongoing (interventions implemented as appropriate)      Problem: Overarching Goals (Adult)  Goal: Adheres to Safety Considerations for Self and Others  Outcome: Ongoing (interventions implemented as appropriate)    Goal: Optimized Coping Skills in Response to Life Stressors  Outcome: Ongoing (interventions implemented as appropriate)    Goal: Develops/Participates in Therapeutic [place] to Support Successful Transition  Outcome: Ongoing (interventions implemented as appropriate)      Problem: Mood Impairment (Anxiety Signs/Symptoms) (Adult)  Goal: Improved Mood Symptoms (Anxiety Signs/Symptoms)  Outcome: Ongoing (interventions implemented as appropriate)      Problem: Cognitive Impairment (Anxiety Signs/Symptoms) (Adult)  Goal: Improved Cognitive Function (Anxiety Signs/Symptoms)  Outcome: Ongoing (interventions implemented as appropriate)      Problem: Sensory Perception Impairment (Psychotic Signs/Symptoms) (Pediatric)  Goal: Decrease Frequency/Intensity of Sensory Symptoms(Psychotic Signs/Symptoms)  Outcome: Ongoing (interventions implemented as appropriate)      Problem: Mental State/Mood Impairment (Psychotic Signs/Symptoms) (Pediatric)  Goal: Improved Mental State/Mood (Psychotic Signs/Symptoms)  Outcome: Ongoing (interventions implemented as appropriate)      Problem: Pain, Chronic (Adult)  Goal: Acceptable Pain/Comfort Level and Functional Ability  Outcome: Ongoing (interventions implemented as appropriate)      Problem: Mood Impairment (Depressive Signs/Symptoms) (Adult)  Goal: Improved Mood Symptoms (Depressive Signs/Symptoms)  Outcome:  Ongoing (interventions implemented as appropriate)      Problem: Decreased Participation/Engagement (Depressive Signs/Symptoms) (Adult)  Goal: Increased Participation/Engagement (Depressive Signs/Symptoms)  Outcome: Ongoing (interventions implemented as appropriate)

## 2019-09-24 NOTE — OP NOTE
Diagnosis. Left ankle fracture  Surgery. Open reduction internal fixation of the left ankle fracture, trimalleolar ankle fracture  Blood loss. 50    The left ankle is washed, and sterile prep with the surgical set up is performed.  Iv antibiotics are given, then the tourniquet is inflated.  The medial malleolus is exposed, debrided of hematoma and periosteum.  The medial malleolus is positioned with a dental hook.  Jr wire fixation is placed.  The posterior tibia fragment is reduced with the bone tenaculum clamp, and 4.0mm screws are placed from posterior incisions.  The fibula is exposed with a lateral incision.  Fracture site is debrided of periosteum and hematoma.  A lateral plate is screwed in place, with the fracture reduced.  Xrays of the ankle show alignment and the fracture reduced.  The wound is irrigated and closed.  Splint is placed.  Blood loss is 50.   Pt called to notify me that her rx that Dr. Bean escribed on 9/19/19 was not at [facility].  It looks like the rx was printed and went to [place].  Budesonide 3mg take 2 capsules daily for one month,then take 1 capsule daily for one month,then take 1 capsule every other day for 2 months called to [facility] [phone] advised

## 2019-10-04 ENCOUNTER — HOSPITAL ENCOUNTER (EMERGENCY)
Facility: HOSPITAL | Age: 39
Discharge: HOME | End: 2019-10-04
Attending: FAMILY MEDICINE | Admitting: FAMILY MEDICINE

## 2019-10-04 ENCOUNTER — APPOINTMENT (OUTPATIENT)
Dept: GENERAL RADIOLOGY | Facility: HOSPITAL | Age: 39
End: 2019-10-04

## 2019-10-04 VITALS
HEIGHT: 64 IN | TEMPERATURE: 97.9 F | HEART RATE: 96 BPM | RESPIRATION RATE: 18 BRPM | OXYGEN SATURATION: 95 % | BODY MASS INDEX: 31.92 KG/M2 | DIASTOLIC BLOOD PRESSURE: 71 MMHG | SYSTOLIC BLOOD PRESSURE: 109 MMHG | WEIGHT: 187 LBS

## 2019-10-04 DIAGNOSIS — S16.1XXA STRAIN OF NECK MUSCLE, INITIAL ENCOUNTER: ICD-10-CM
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
## 2019-10-04 DIAGNOSIS — S20.219A CONTUSION OF CHEST WALL, UNSPECIFIED LATERALITY, INITIAL ENCOUNTER: ICD-10-CM

## 2019-10-04 LAB — B-HCG UR QL: NEGATIVE

## 2019-10-04 PROCEDURE — 81025 URINE PREGNANCY TEST: CPT | Performed by: FAMILY MEDICINE

## 2019-10-04 PROCEDURE — 71111 X-RAY EXAM RIBS/CHEST4/> VWS: CPT

## 2019-10-04 PROCEDURE — 99284 EMERGENCY DEPT VISIT MOD MDM: CPT

## 2019-10-04 PROCEDURE — 72040 X-RAY EXAM NECK SPINE 2-3 VW: CPT

## 2019-10-04 RX ORDER — HYDROCODONE BITARTRATE AND ACETAMINOPHEN 7.5; 325 MG/1; MG/1
1 TABLET ORAL ONCE
Status: COMPLETED | OUTPATIENT
Start: 2019-10-04 | End: 2019-10-04

## 2019-10-04 RX ADMIN — HYDROCODONE BITARTRATE AND ACETAMINOPHEN 1 TABLET: 7.5; 325 TABLET ORAL at 20:11
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
## 2019-11-08 ENCOUNTER — TELEPHONE (OUTPATIENT)
Dept: FAMILY MEDICINE CLINIC | Facility: CLINIC | Age: 39
End: 2019-11-08

## 2019-11-08 NOTE — TELEPHONE ENCOUNTER
Chris stopped by today and wanted to speak to Aileen, I told her we were in the process of moving and I would send Aileen a message and she could call her when she gets it, said she wanted to talk to her friend to friend.  [phone]

## 2020-01-19 ENCOUNTER — HOSPITAL ENCOUNTER (EMERGENCY)
Facility: HOSPITAL | Age: 40
Discharge: HOME | End: 2020-01-19
Attending: FAMILY MEDICINE | Admitting: FAMILY MEDICINE

## 2020-01-19 VITALS
SYSTOLIC BLOOD PRESSURE: 108 MMHG | HEART RATE: 76 BPM | TEMPERATURE: 97.6 F | OXYGEN SATURATION: 98 % | RESPIRATION RATE: 20 BRPM | DIASTOLIC BLOOD PRESSURE: 82 MMHG | HEIGHT: 72 IN | BODY MASS INDEX: 24.38 KG/M2 | WEIGHT: 180 LBS

## 2020-01-19 DIAGNOSIS — R25.1 TREMORS OF NERVOUS SYSTEM: Primary | ICD-10-CM

## 2020-01-19 DIAGNOSIS — E87.6 HYPOKALEMIA: ICD-10-CM

## 2020-01-19 DIAGNOSIS — F29 UNSPECIFIED PSYCHOSIS NOT DUE TO A SUBSTANCE OR KNOWN PHYSIOLOGICAL CONDITION (HCC): ICD-10-CM

## 2020-01-19 DIAGNOSIS — F20.9 SCHIZOPHRENIA, UNSPECIFIED TYPE (HCC): ICD-10-CM

## 2020-01-19 LAB
ALBUMIN SERPL-MCNC: 4 G/DL (ref 3.5–5.2)
ALBUMIN/GLOB SERPL: 1.3 G/DL
ALP SERPL-CCNC: 58 U/L (ref 39–117)
ALT SERPL W P-5'-P-CCNC: 19 U/L (ref 1–33)
AMPHET+METHAMPHET UR QL: NEGATIVE
AMPHETAMINES UR QL: NEGATIVE
ANION GAP SERPL CALCULATED.3IONS-SCNC: 16 MMOL/L (ref 5–15)
APAP SERPL-MCNC: <5 MCG/ML (ref 10–30)
AST SERPL-CCNC: 12 U/L (ref 1–32)
BACTERIA UR QL AUTO: ABNORMAL /HPF
BARBITURATES UR QL SCN: NEGATIVE
BASOPHILS # BLD AUTO: 0.06 10*3/MM3 (ref 0–0.2)
BASOPHILS NFR BLD AUTO: 0.8 % (ref 0–1.5)
BENZODIAZ UR QL SCN: POSITIVE
BILIRUB SERPL-MCNC: 0.2 MG/DL (ref 0.2–1.2)
BILIRUB UR QL STRIP: ABNORMAL
BUN BLD-MCNC: 10 MG/DL (ref 6–20)
BUN/CREAT SERPL: 12.3 (ref 7–25)
BUPRENORPHINE SERPL-MCNC: NEGATIVE NG/ML
CALCIUM SPEC-SCNC: 9.6 MG/DL (ref 8.6–10.5)
CANNABINOIDS SERPL QL: POSITIVE
CHLORIDE SERPL-SCNC: 105 MMOL/L (ref 98–107)
CK SERPL-CCNC: 59 U/L (ref 20–180)
CLARITY UR: ABNORMAL
CO2 SERPL-SCNC: 22 MMOL/L (ref 22–29)
COCAINE UR QL: NEGATIVE
COD CRY URNS QL: ABNORMAL /HPF
COLOR UR: ABNORMAL
CREAT BLD-MCNC: 0.81 MG/DL (ref 0.57–1)
DEPRECATED RDW RBC AUTO: 39.8 FL (ref 37–54)
EOSINOPHIL # BLD AUTO: 0.24 10*3/MM3 (ref 0–0.4)
EOSINOPHIL NFR BLD AUTO: 3.3 % (ref 0.3–6.2)
ERYTHROCYTE [DISTWIDTH] IN BLOOD BY AUTOMATED COUNT: 11.9 % (ref 12.3–15.4)
ETHANOL BLD-MCNC: <10 MG/DL (ref 0–10)
ETHANOL UR QL: <0.01 %
GFR SERPL CREATININE-BSD FRML MDRD: 80 ML/MIN/1.73
GLOBULIN UR ELPH-MCNC: 3.1 GM/DL
GLUCOSE BLD-MCNC: 113 MG/DL (ref 65–99)
GLUCOSE UR STRIP-MCNC: NEGATIVE MG/DL
HCT VFR BLD AUTO: 41.5 % (ref 34–46.6)
HGB BLD-MCNC: 14.1 G/DL (ref 12–15.9)
HGB UR QL STRIP.AUTO: NEGATIVE
HYALINE CASTS UR QL AUTO: ABNORMAL /LPF
IMM GRANULOCYTES # BLD AUTO: 0.01 10*3/MM3 (ref 0–0.05)
IMM GRANULOCYTES NFR BLD AUTO: 0.1 % (ref 0–0.5)
KETONES UR QL STRIP: ABNORMAL
LEUKOCYTE ESTERASE UR QL STRIP.AUTO: ABNORMAL
LYMPHOCYTES # BLD AUTO: 3.21 10*3/MM3 (ref 0.7–3.1)
LYMPHOCYTES NFR BLD AUTO: 44.7 % (ref 19.6–45.3)
MAGNESIUM SERPL-MCNC: 1.8 MG/DL (ref 1.6–2.6)
MCH RBC QN AUTO: 30.9 PG (ref 26.6–33)
MCHC RBC AUTO-ENTMCNC: 34 G/DL (ref 31.5–35.7)
MCV RBC AUTO: 91 FL (ref 79–97)
METHADONE UR QL SCN: NEGATIVE
MONOCYTES # BLD AUTO: 0.53 10*3/MM3 (ref 0.1–0.9)
MONOCYTES NFR BLD AUTO: 7.4 % (ref 5–12)
MUCOUS THREADS URNS QL MICRO: ABNORMAL /HPF
NEUTROPHILS # BLD AUTO: 3.13 10*3/MM3 (ref 1.7–7)
NEUTROPHILS NFR BLD AUTO: 43.7 % (ref 42.7–76)
NITRITE UR QL STRIP: NEGATIVE
NRBC BLD AUTO-RTO: 0 /100 WBC (ref 0–0.2)
OPIATES UR QL: POSITIVE
OXYCODONE UR QL SCN: NEGATIVE
PCP UR QL SCN: NEGATIVE
PH UR STRIP.AUTO: 5.5 [PH] (ref 5–9)
PLATELET # BLD AUTO: 246 10*3/MM3 (ref 140–450)
PMV BLD AUTO: 11 FL (ref 6–12)
POTASSIUM BLD-SCNC: 3.4 MMOL/L (ref 3.5–5.2)
PROPOXYPH UR QL: NEGATIVE
PROT SERPL-MCNC: 7.1 G/DL (ref 6–8.5)
PROT UR QL STRIP: NEGATIVE
RBC # BLD AUTO: 4.56 10*6/MM3 (ref 3.77–5.28)
RBC # UR: ABNORMAL /HPF
REF LAB TEST METHOD: ABNORMAL
SALICYLATES SERPL-MCNC: <0.3 MG/DL
SODIUM BLD-SCNC: 143 MMOL/L (ref 136–145)
SP GR UR STRIP: 1.03 (ref 1–1.03)
SQUAMOUS #/AREA URNS HPF: ABNORMAL /HPF
TRICYCLICS UR QL SCN: NEGATIVE
TSH SERPL DL<=0.05 MIU/L-ACNC: 1.09 UIU/ML (ref 0.27–4.2)
UROBILINOGEN UR QL STRIP: ABNORMAL
WBC NRBC COR # BLD: 7.18 10*3/MM3 (ref 3.4–10.8)
WBC UR QL AUTO: ABNORMAL /HPF

## 2020-01-19 PROCEDURE — 81001 URINALYSIS AUTO W/SCOPE: CPT | Performed by: FAMILY MEDICINE

## 2020-01-19 PROCEDURE — 80307 DRUG TEST PRSMV CHEM ANLYZR: CPT | Performed by: FAMILY MEDICINE

## 2020-01-19 PROCEDURE — 96376 TX/PRO/DX INJ SAME DRUG ADON: CPT

## 2020-01-19 PROCEDURE — 82550 ASSAY OF CK (CPK): CPT | Performed by: FAMILY MEDICINE

## 2020-01-19 PROCEDURE — 25010000002 LORAZEPAM PER 2 MG

## 2020-01-19 PROCEDURE — 96374 THER/PROPH/DIAG INJ IV PUSH: CPT

## 2020-01-19 PROCEDURE — 25010000002 LORAZEPAM PER 2 MG: Performed by: FAMILY MEDICINE

## 2020-01-19 PROCEDURE — 99283 EMERGENCY DEPT VISIT LOW MDM: CPT

## 2020-01-19 PROCEDURE — 80053 COMPREHEN METABOLIC PANEL: CPT | Performed by: FAMILY MEDICINE

## 2020-01-19 PROCEDURE — 84443 ASSAY THYROID STIM HORMONE: CPT | Performed by: FAMILY MEDICINE

## 2020-01-19 PROCEDURE — 85025 COMPLETE CBC W/AUTO DIFF WBC: CPT | Performed by: FAMILY MEDICINE

## 2020-01-19 PROCEDURE — 83735 ASSAY OF MAGNESIUM: CPT | Performed by: FAMILY MEDICINE

## 2020-01-19 RX ORDER — BENZTROPINE MESYLATE 1 MG/1
1 TABLET ORAL NIGHTLY
Qty: 30 TABLET | Refills: 0 | Status: SHIPPED | OUTPATIENT
Start: 2020-01-19 | End: 2020-03-30

## 2020-01-19 RX ORDER — HYDROCODONE BITARTRATE AND ACETAMINOPHEN 10; 325 MG/1; MG/1
1 TABLET ORAL ONCE
Status: COMPLETED | OUTPATIENT
Start: 2020-01-19 | End: 2020-01-19

## 2020-01-19 RX ORDER — POTASSIUM CHLORIDE 750 MG/1
10 TABLET, FILM COATED, EXTENDED RELEASE ORAL 2 TIMES DAILY
Qty: 20 TABLET | Refills: 0 | Status: SHIPPED | OUTPATIENT
Start: 2020-01-19 | End: 2020-06-03

## 2020-01-19 RX ORDER — HALOPERIDOL 0.5 MG/1
2.5 TABLET ORAL
Qty: 14 TABLET | Refills: 0 | Status: SHIPPED | OUTPATIENT
Start: 2020-01-19 | End: 2020-01-19

## 2020-01-19 RX ORDER — PROPRANOLOL HYDROCHLORIDE 10 MG/1
10 TABLET ORAL 2 TIMES DAILY
Qty: 28 TABLET | Refills: 0 | Status: SHIPPED | OUTPATIENT
Start: 2020-01-19 | End: 2021-02-22

## 2020-01-19 RX ORDER — HALOPERIDOL 5 MG/1
2.5 TABLET ORAL
Qty: 7 TABLET | Refills: 0 | Status: SHIPPED | OUTPATIENT
Start: 2020-01-19 | End: 2020-02-02

## 2020-01-19 RX ORDER — LORAZEPAM 2 MG/ML
1 INJECTION INTRAMUSCULAR ONCE
Status: COMPLETED | OUTPATIENT
Start: 2020-01-19 | End: 2020-01-19

## 2020-01-19 RX ORDER — LORAZEPAM 2 MG/ML
INJECTION INTRAMUSCULAR
Status: COMPLETED
Start: 2020-01-19 | End: 2020-01-19

## 2020-01-19 RX ORDER — LORAZEPAM 2 MG/ML
INJECTION INTRAMUSCULAR
Status: DISCONTINUED
Start: 2020-01-19 | End: 2020-01-20 | Stop reason: HOSPADM

## 2020-01-19 RX ORDER — HALOPERIDOL 0.5 MG/1
0.5 TABLET ORAL
Qty: 14 TABLET | Refills: 0 | Status: SHIPPED | OUTPATIENT
Start: 2020-01-19 | End: 2020-01-19 | Stop reason: SDUPTHER

## 2020-01-19 RX ADMIN — LORAZEPAM 1 MG: 2 INJECTION INTRAMUSCULAR; INTRAVENOUS at 23:13

## 2020-01-19 RX ADMIN — LORAZEPAM 1 MG: 2 INJECTION INTRAMUSCULAR; INTRAVENOUS at 22:05

## 2020-01-19 RX ADMIN — LORAZEPAM 1 MG: 2 INJECTION INTRAMUSCULAR at 22:05

## 2020-01-19 RX ADMIN — HYDROCODONE BITARTRATE AND ACETAMINOPHEN 1 TABLET: 10; 325 TABLET ORAL at 21:27

## 2020-01-20 ENCOUNTER — HOSPITAL ENCOUNTER (EMERGENCY)
Facility: HOSPITAL | Age: 40
Discharge: HOME | End: 2020-01-20
Attending: FAMILY MEDICINE | Admitting: FAMILY MEDICINE

## 2020-01-20 VITALS
WEIGHT: 180 LBS | DIASTOLIC BLOOD PRESSURE: 69 MMHG | BODY MASS INDEX: 30.73 KG/M2 | HEIGHT: 64 IN | RESPIRATION RATE: 16 BRPM | OXYGEN SATURATION: 98 % | HEART RATE: 94 BPM | TEMPERATURE: 98.1 F | SYSTOLIC BLOOD PRESSURE: 118 MMHG

## 2020-01-20 DIAGNOSIS — M54.2 MUSCLE PAIN, CERVICAL: ICD-10-CM

## 2020-01-20 DIAGNOSIS — G24.01 TARDIVE DYSKINESIA: Primary | ICD-10-CM

## 2020-01-20 DIAGNOSIS — F41.9 ANXIETY: ICD-10-CM

## 2020-01-20 PROCEDURE — 99283 EMERGENCY DEPT VISIT LOW MDM: CPT

## 2020-01-20 PROCEDURE — 25010000002 LORAZEPAM PER 2 MG: Performed by: EMERGENCY MEDICINE

## 2020-01-20 PROCEDURE — 96372 THER/PROPH/DIAG INJ SC/IM: CPT

## 2020-01-20 RX ORDER — LORAZEPAM 1 MG/1
1 TABLET ORAL EVERY 8 HOURS PRN
Qty: 15 TABLET | Refills: 0 | Status: SHIPPED | OUTPATIENT
Start: 2020-01-20 | End: 2020-03-30

## 2020-01-20 RX ORDER — LORAZEPAM 2 MG/ML
2 INJECTION INTRAMUSCULAR ONCE
Status: COMPLETED | OUTPATIENT
Start: 2020-01-20 | End: 2020-01-20

## 2020-01-20 RX ORDER — OXYCODONE AND ACETAMINOPHEN 7.5; 325 MG/1; MG/1
1 TABLET ORAL EVERY 6 HOURS PRN
Qty: 15 TABLET | Refills: 0 | OUTPATIENT
Start: 2020-01-20 | End: 2020-03-30 | Stop reason: HOSPADM

## 2020-01-20 RX ADMIN — LORAZEPAM 2 MG: 2 INJECTION INTRAMUSCULAR; INTRAVENOUS at 07:33

## 2020-01-20 NOTE — ED PROVIDER NOTES
Subjective   Patient states that she was started on Haldol probably 6 months ago, she is not sure of the exact start date.  Her and her  both confirm that she has been having tremors for the past 2 months.  They state that her Haldol doses have been gradually tapered over the past 2 months.  She does have follow-up with psych next week for possible discontinuation of her Haldol.  She states that she has been started on 2 other medications to help with the tremors in the meantime, but is unsure what medications they are.      Medication Reaction   Presenting symptoms: no difficulty swallowing, no rash and no wheezing    Presenting symptoms comment:  Tremors    Severity:  Moderate  Duration: 2 months.  Prior allergic episodes:  No prior episodes  Relieved by:  Nothing  Worsened by:  Nothing      Review of Systems   Constitutional: Negative for appetite change, chills, diaphoresis, fatigue and fever.   HENT: Negative for congestion, ear discharge, ear pain, nosebleeds, rhinorrhea, sinus pressure, sore throat and trouble swallowing.    Eyes: Negative for discharge and redness.   Respiratory: Negative for apnea, cough, chest tightness, shortness of breath and wheezing.    Cardiovascular: Negative for chest pain.   Gastrointestinal: Negative for abdominal pain, diarrhea, nausea and vomiting.   Endocrine: Negative for polyuria.   Genitourinary: Negative for dysuria, frequency and urgency.   Musculoskeletal: Negative for myalgias and neck pain.   Skin: Negative for color change and rash.   Allergic/Immunologic: Negative for immunocompromised state.   Neurological: Positive for tremors. Negative for dizziness, seizures, syncope, weakness, light-headedness and headaches.   Hematological: Negative for adenopathy. Does not bruise/bleed easily.   Psychiatric/Behavioral: Negative for behavioral problems and confusion.   All other systems reviewed and are negative.      Past Medical History:   Diagnosis Date   • Abdominal  "pain    • Acute bronchitis    • Acute upper respiratory infection    • Anxiety state    • Backache    • Bell's palsy    • Cellulitis      left breast      • Chest wall pain     right lateral chest wall-poss early shingles      • Chest wall pain 10/21/2015    right lateral chest wall-poss early shingles      • Chronic low back pain     May 13 2014 MRI ddd with mild left neuroforminal narrowing l4-l5      • Chronic retention of urine     ACUTE   • Cough    • Depressive disorder    • Dysuria    • Facial hemiparesis (CMS/HCC)    • Facial hemiparesis (CMS/HCC)    • Fatigue    • Fatigue    • Fever    • Ganglion cyst     LEFT BREAST   • Generalized anxiety disorder    • Headache    • Insomnia    • Migraine    • Pain and other symptoms associated with female genital organs    • Pain radiating to back     LUMBAR   • Postnasal drip    • Retention of urine    • Schizophrenia (CMS/HCC)    • Skin sensation disturbance    • Urinary incontinence    • Urinary tract infectious disease    • Viral gastroenteritis    • Vulvovaginitis        Allergies   Allergen Reactions   • Compazine [Prochlorperazine Edisylate] Anxiety     \"coming out of my skin\"       Past Surgical History:   Procedure Laterality Date   • ANKLE OPEN REDUCTION INTERNAL FIXATION Left 2017    Procedure: ANKLE OPEN REDUCTION INTERNAL FIXATION;  Surgeon: Leo Cisneros MD;  Location: [facility];  Service:    • APPENDECTOMY     •  SECTION     • CHOLECYSTECTOMY     • INJECTION OF MEDICATION  2016    KENALOG(4)   • INJECTION OF MEDICATION  2011    PHENERGAN(1)   • INJECTION OF MEDICATION  2015    ROCEPHIN(1)   • INJECTION OF MEDICATION  2015    TORADOL(2)       Family History   Problem Relation Age of Onset   • Alzheimer's disease Other    • ADD / ADHD Other    • Depression Other    • Diabetes Other    • Migraines Other        Social History     Socioeconomic History   • Marital status:      Spouse name: Not on file   • " Number of children: Not on file   • Years of education: Not on file   • Highest education level: Not on file   Tobacco Use   • Smoking status: Current Every Day Smoker     Packs/day: 1.00     Types: Cigarettes   • Smokeless tobacco: Never Used   Substance and Sexual Activity   • Alcohol use: Yes     Comment: occ   • Drug use: No   • Sexual activity: Defer           Objective   Physical Exam   Constitutional: She is oriented to person, place, and time. She appears well-developed and well-nourished.   HENT:   Head: Normocephalic and atraumatic.   Nose: Nose normal.   Mouth/Throat: Oropharynx is clear and moist.   Eyes: Pupils are equal, round, and reactive to light. Conjunctivae and EOM are normal. Right eye exhibits no discharge. Left eye exhibits no discharge. No scleral icterus.   Neck: Normal range of motion. Neck supple. No tracheal deviation present.   Cardiovascular: Normal rate, regular rhythm and normal heart sounds.   No murmur heard.  Pulmonary/Chest: Effort normal and breath sounds normal. No stridor. No respiratory distress. She has no wheezes. She has no rales.   Abdominal: Soft. Bowel sounds are normal. She exhibits no distension and no mass. There is no tenderness. There is no rebound and no guarding.   Musculoskeletal: She exhibits no edema.   Neurological: She is alert and oriented to person, place, and time. She displays tremor (generalized). Coordination normal.   Skin: Skin is warm and dry. No rash noted. No erythema.   Psychiatric: She has a normal mood and affect. Her behavior is normal. Thought content normal.   Nursing note and vitals reviewed.      Procedures           ED Course  ED Course as of Jan 19 2357   Sun Jan 19, 2020   4987 Findings discussed with Dr. Morejon, who recommends starting a beta-blocker for the patient's tremors, decreasing the patient's Haldol to one half her current dose, which would be 2.5 mg, and also instructed the patient to follow-up with [facility]  walk-in clinic tomorrow.  Patient states that she was initially taking 20 mg of Haldol which was then decreased to 10 mg, and then to 5 mg.  She also admits to not taking her Cogentin for the past 2 months as the prescription has run out.    [CB]   9440 At discharge, mother-in-law states that patient told her that she stopped taking her Haldol roughly 1 week ago.  Mother-in-law and the patient's  state that they will make sure patient attends a walk-in appointment at [facility] tomorrow.    [CB]      ED Course User Index  [CB] Дмитрий Dickens MD                      [place] Coma Scale Score: 15                  Labs Reviewed   COMPREHENSIVE METABOLIC PANEL - Abnormal; Notable for the following components:       Result Value    Glucose 113 (*)     Potassium 3.4 (*)     Anion Gap 16.0 (*)     All other components within normal limits    Narrative:     GFR Normal >60  Chronic Kidney Disease <60  Kidney Failure <15     ACETAMINOPHEN LEVEL - Abnormal; Notable for the following components:    Acetaminophen <5.0 (*)     All other components within normal limits   URINALYSIS W/ MICROSCOPIC IF INDICATED (NO CULTURE) - Abnormal; Notable for the following components:    Appearance, UA Cloudy (*)     Ketones, UA Trace (*)     Bilirubin, UA Small (1+) (*)     Leuk Esterase, UA Small (1+) (*)     All other components within normal limits   URINE DRUG SCREEN - Abnormal; Notable for the following components:    THC, Screen, Urine Positive (*)     Opiate Screen Positive (*)     Benzodiazepine Screen, Urine Positive (*)     All other components within normal limits    Narrative:     Cutoff For Drugs Screened:    Amphetamines               500 ng/ml  Barbiturates               200 ng/ml  Benzodiazepines            150 ng/ml  Cocaine                    150 ng/ml  Methadone                  200 ng/ml  Opiates                    100 ng/ml  Phencyclidine               25 ng/ml  THC                            50  ng/ml  Methamphetamine            500 ng/ml  Tricyclic Antidepressants  300 ng/ml  Oxycodone                  100 ng/ml  Propoxyphene               300 ng/ml  Buprenorphine               10 ng/ml    The normal value for all drugs tested is negative. This report includes unconfirmed screening results, with the cutoff values listed, to be used for medical treatment purposes only.  Unconfirmed results must not be used for non-medical purposes such as employment or legal testing.  Clinical consideration should be applied to any drug of abuse test, particularly when unconfirmed results are used.     CBC WITH AUTO DIFFERENTIAL - Abnormal; Notable for the following components:    RDW 11.9 (*)     Lymphocytes, Absolute 3.21 (*)     All other components within normal limits   URINALYSIS, MICROSCOPIC ONLY - Abnormal; Notable for the following components:    RBC, UA 0-2 (*)     Bacteria, UA 2+ (*)     Squamous Epithelial Cells, UA 3-5 (*)     Mucus, UA Small/1+ (*)     All other components within normal limits   CK - Normal   MAGNESIUM - Normal   TSH - Normal   SALICYLATE LEVEL - Normal   ETHANOL   CBC AND DIFFERENTIAL    Narrative:     The following orders were created for panel order CBC & Differential.  Procedure                               Abnormality         Status                     ---------                               -----------         ------                     CBC Auto Differential[[id number]]        Abnormal            Final result                 Please view results for these tests on the individual orders.       No orders to display                 MDM    Final diagnoses:   Tremors of nervous system   Schizophrenia, unspecified type (CMS/HCC)   Hypokalemia            Дмитрий Dickens MD  01/19/20 2090       Дмитрий iDckens MD  01/19/20 1530

## 2020-01-20 NOTE — ED NOTES
Pt was discharged from this facility last pm with same s/s as present complaint. Present complaint is muscle pain due to tardive dyskinesia related to her psych meds.     Timothy Cadena RN  01/20/20 4297

## 2020-01-20 NOTE — ED NOTES
Patient speaking with Diane via Telehealth conference. Will obtain vitals once evaluation is completed.      Magnolia Mayer RN  01/20/20 9999

## 2020-01-20 NOTE — DISCHARGE INSTRUCTIONS
Followup with [facility] tomorrow for further medication titration. Hold you Lortab and Klonapin today in favor of Percocet and Ativan.  You may switch back to your prior medications as the cogentin improves the shaking.

## 2020-01-20 NOTE — ED NOTES
This RN spoke with Diane bullard, Rep stated that patient was seen last night and was given Ativan, patient reported relief and was able to sleep for approximately one hour post medication administration. Representative also informed this RN that patient was mainly in ED to see if MD could give her something for home for pain and to help with the tremors. MD notified.      Magnolia Mayer RN  01/20/20 0717

## 2020-01-20 NOTE — ED PROVIDER NOTES
Subjective   Patient complains of back pain neck pain leg pain secondary to ongoing tremors that she states has been present for the past 2 months.  She admits to being on Haldol for the past 6 months.  This dose has been tapered from 10 mg to 20 mg to 5 mg over the past 2 months and she has been placed on Cogentin and Klonopin.  Patient was seen earlier tonight and at that time stated that she recently ran out of her Cogentin, and upon discharge stated that she had not taken her Haldol for the past week. History from the patient had been conflicting. Patient now states that she has not taken her Cogentin for the past 2 months.  She denies suicidal ideation.      Muscle Pain   Location:  Generalized  Quality:  Aching  Severity:  Moderate  Onset quality:  Unable to specify  Timing:  Constant  Progression:  Waxing and waning  Associated symptoms: fatigue and myalgias    Associated symptoms: no abdominal pain, no chest pain, no congestion, no cough, no diarrhea, no ear pain, no fever, no headaches, no nausea, no rash, no rhinorrhea, no shortness of breath, no sore throat, no vomiting and no wheezing        Review of Systems   Constitutional: Positive for fatigue. Negative for appetite change, chills, diaphoresis and fever.   HENT: Negative for congestion, ear discharge, ear pain, nosebleeds, rhinorrhea, sinus pressure, sore throat and trouble swallowing.    Eyes: Negative for discharge and redness.   Respiratory: Negative for apnea, cough, chest tightness, shortness of breath and wheezing.    Cardiovascular: Negative for chest pain.   Gastrointestinal: Negative for abdominal pain, diarrhea, nausea and vomiting.   Endocrine: Negative for polyuria.   Genitourinary: Negative for dysuria, frequency and urgency.   Musculoskeletal: Positive for myalgias. Negative for neck pain.   Skin: Negative for color change and rash.   Allergic/Immunologic: Negative for immunocompromised state.   Neurological: Positive for tremors.  "Negative for dizziness, seizures, syncope, weakness, light-headedness and headaches.   Hematological: Negative for adenopathy. Does not bruise/bleed easily.   Psychiatric/Behavioral: Negative for behavioral problems and confusion.   All other systems reviewed and are negative.      Past Medical History:   Diagnosis Date   • Abdominal pain    • Acute bronchitis    • Acute upper respiratory infection    • Anxiety state    • Backache    • Bell's palsy    • Cellulitis      left breast      • Chest wall pain     right lateral chest wall-poss early shingles      • Chest wall pain 10/21/2015    right lateral chest wall-poss early shingles      • Chronic low back pain     May 13 2014 MRI ddd with mild left neuroforminal narrowing l4-l5      • Chronic retention of urine     ACUTE   • Cough    • Depressive disorder    • Dysuria    • Facial hemiparesis (CMS/HCC)    • Facial hemiparesis (CMS/HCC)    • Fatigue    • Fatigue    • Fever    • Ganglion cyst     LEFT BREAST   • Generalized anxiety disorder    • Headache    • Insomnia    • Migraine    • Pain and other symptoms associated with female genital organs    • Pain radiating to back     LUMBAR   • Postnasal drip    • Retention of urine    • Schizophrenia (CMS/HCC)    • Skin sensation disturbance    • Urinary incontinence    • Urinary tract infectious disease    • Viral gastroenteritis    • Vulvovaginitis        Allergies   Allergen Reactions   • Compazine [Prochlorperazine Edisylate] Anxiety     \"coming out of my skin\"       Past Surgical History:   Procedure Laterality Date   • ANKLE OPEN REDUCTION INTERNAL FIXATION Left 2017    Procedure: ANKLE OPEN REDUCTION INTERNAL FIXATION;  Surgeon: Leo Cisneros MD;  Location: [facility];  Service:    • APPENDECTOMY     •  SECTION     • CHOLECYSTECTOMY     • INJECTION OF MEDICATION  2016    KENALOG(4)   • INJECTION OF MEDICATION  2011    PHENERGAN(1)   • INJECTION OF MEDICATION  2015    " ROCEPHIN(1)   • INJECTION OF MEDICATION  06/05/2015    TORADOL(2)       Family History   Problem Relation Age of Onset   • Alzheimer's disease Other    • ADD / ADHD Other    • Depression Other    • Diabetes Other    • Migraines Other        Social History     Socioeconomic History   • Marital status:      Spouse name: Not on file   • Number of children: Not on file   • Years of education: Not on file   • Highest education level: Not on file   Tobacco Use   • Smoking status: Current Every Day Smoker     Packs/day: 1.00     Types: Cigarettes   • Smokeless tobacco: Never Used   Substance and Sexual Activity   • Alcohol use: Yes     Comment: occ   • Drug use: No   • Sexual activity: Defer           Objective   Physical Exam   Constitutional: She is oriented to person, place, and time. She appears well-developed and well-nourished.   HENT:   Head: Normocephalic and atraumatic.   Nose: Nose normal.   Mouth/Throat: Oropharynx is clear and moist.   Eyes: Pupils are equal, round, and reactive to light. Conjunctivae and EOM are normal. Right eye exhibits no discharge. Left eye exhibits no discharge. No scleral icterus.   Neck: Normal range of motion. Neck supple. No tracheal deviation present.   Cardiovascular: Normal rate, regular rhythm and normal heart sounds.   No murmur heard.  Pulmonary/Chest: Effort normal and breath sounds normal. No stridor. No respiratory distress. She has no wheezes. She has no rales.   Abdominal: Soft. Bowel sounds are normal. She exhibits no distension and no mass. There is no tenderness. There is no rebound and no guarding.   Musculoskeletal: She exhibits no edema.   Neurological: She is alert and oriented to person, place, and time. She displays tremor. No sensory deficit. She displays no seizure activity. Coordination normal. GCS eye subscore is 4. GCS verbal subscore is 5. GCS motor subscore is 6.   Skin: Skin is warm and dry. No rash noted. No erythema.   Psychiatric: She has a normal  mood and affect. Her behavior is normal. Thought content normal.   Nursing note and vitals reviewed.      Procedures           ED Course  ED Course as of Jan 20 1900   Mon Jan 20, 2020   0740 [facility]#[id number]    [PC]      ED Course User Index  [PC] Timothy King MD             Labs Reviewed - No data to display    No orders to display                  [place] Coma Scale Score: 15                  Medication changes as per Dr. Dickens last night.  Prn medication changes.  Followup with [facility] for further management.          MDM    Final diagnoses:   Tardive dyskinesia   Anxiety   Muscle pain, cervical            Timothy King MD  01/20/20 0729       Timothy King MD  01/20/20 1900

## 2020-04-30 ENCOUNTER — TELEMEDICINE (OUTPATIENT)
Dept: FAMILY MEDICINE CLINIC | Facility: CLINIC | Age: 40
End: 2020-04-30

## 2020-04-30 DIAGNOSIS — F25.9 SCHIZOAFFECTIVE DISORDER, UNSPECIFIED TYPE (HCC): Primary | ICD-10-CM

## 2020-04-30 DIAGNOSIS — F41.9 ANXIETY: ICD-10-CM

## 2020-04-30 DIAGNOSIS — R41.3 MEMORY LOSS: ICD-10-CM

## 2020-04-30 PROCEDURE — 99213 OFFICE O/P EST LOW 20 MIN: CPT | Performed by: NURSE PRACTITIONER

## 2020-04-30 NOTE — PROGRESS NOTES
No chief complaint on file.    Subjective   Chris Bennett Valdez is a 39 y.o. female.     Presents with recheck of mental health concerns, followed by [facility] by Dr Ba and Derek-followed once a month-recent change in meds-needs fmla complete for her  javed to take her to and from appointments and stay home from work if needed needs during exacerbation of symptoms-mostly during hallucinations or mood changes. Patient is not able to drive at this time. Telemedicine visit   Anxiety   Presents for follow-up visit. Symptoms include confusion, decreased concentration, depressed mood, excessive worry, insomnia, irritability, nervous/anxious behavior, obsessions, palpitations and panic. Patient reports no chest pain, compulsions, dizziness, feeling of choking, hyperventilation, impotence, malaise, muscle tension, nausea, restlessness, shortness of breath or suicidal ideas. Symptoms occur most days. The severity of symptoms is incapacitating and interfering with daily activities. The quality of sleep is good. Nighttime awakenings: none.     Compliance with medications is %. Side effects of treatment include GI discomfort, headaches, joint pain and visual problems.        The following portions of the patient's history were reviewed and updated as appropriate: allergies, current medications, past social history and problem list.    Review of Systems   Constitutional: Positive for irritability. Negative for activity change, appetite change, chills, diaphoresis, fatigue, fever and unexpected weight change.   HENT: Negative for congestion, dental problem, drooling, ear pain, hearing loss, mouth sores, rhinorrhea, sinus pressure, sinus pain, sneezing, sore throat and tinnitus.    Eyes: Negative.  Negative for photophobia, pain, discharge, redness, itching and visual disturbance.   Respiratory: Negative.  Negative for apnea, cough, choking, chest tightness, shortness of breath, wheezing and  stridor.    Cardiovascular: Positive for palpitations. Negative for chest pain and leg swelling.   Gastrointestinal: Negative.  Negative for abdominal distention, abdominal pain, anal bleeding, blood in stool, constipation, diarrhea, nausea and vomiting.   Endocrine: Negative.  Negative for cold intolerance, heat intolerance, polydipsia, polyphagia and polyuria.   Genitourinary: Negative.  Negative for difficulty urinating, dyspareunia and impotence.   Musculoskeletal: Negative.  Negative for arthralgias, back pain, gait problem, joint swelling, myalgias, neck pain and neck stiffness.   Skin: Negative.  Negative for color change and pallor.   Allergic/Immunologic: Negative.  Negative for environmental allergies, food allergies and immunocompromised state.   Neurological: Positive for light-headedness and headaches. Negative for dizziness, tremors, seizures, syncope, facial asymmetry, speech difficulty, weakness and numbness.        HX OF MIGRAINES    Hematological: Negative.  Negative for adenopathy. Does not bruise/bleed easily.   Psychiatric/Behavioral: Positive for agitation, behavioral problems, confusion, decreased concentration, dysphoric mood, hallucinations and sleep disturbance. Negative for self-injury and suicidal ideas. The patient is nervous/anxious, has insomnia and is hyperactive.         Increase in mood swings, memory loss, confusing days and nights, sleeping most of the day  Increase in memory loss with recent medication change         Objective   There were no vitals taken for this visit.  Physical Exam   Constitutional: She is oriented to person, place, and time. She appears well-developed and well-nourished.   Neurological: She is alert and oriented to person, place, and time. She displays normal reflexes. No cranial nerve deficit or sensory deficit. She exhibits normal muscle tone. Coordination normal.   Slow speech noted    Vitals reviewed.      Assessment/Plan   Problem List Items Addressed  This Visit        Other    Anxiety    Memory loss    Schizoaffective disorder (CMS/HCC) - Primary         No orders of the defined types were placed in this encounter.      It's not just what you eat, but when you eat  Eat breakfast, and eat smaller meals throughout the day. A healthy breakfast can jumpstart your metabolism, while eating small, healthy meals (rather than the standard three large meals) keeps your energy up.   Avoid eating at night. Try to eat dinner earlier and fast for 14-16 hours until breakfast the next morning. Studies suggest that eating only when you’re most active and giving your digestive system a long break each day may help to regulate weight.     You have chosen to receive care through a telehealth visit.  Do you consent to use a video/audio connection for your medical care today? Yes      approx 16 minutes spent on this visit    I suggested patient call her provider and let her know the side effects she is having-fmla completed for her  javed.   ER for admission if this worsens in the next few days-patient and spouse agree    The use of a video visit has been reviewed with the patient and verbal informed consent has been obtained.  ..

## 2020-06-03 ENCOUNTER — LAB (OUTPATIENT)
Dept: LAB | Facility: HOSPITAL | Age: 40
End: 2020-06-03

## 2020-06-03 ENCOUNTER — OFFICE VISIT (OUTPATIENT)
Dept: FAMILY MEDICINE CLINIC | Facility: CLINIC | Age: 40
End: 2020-06-03

## 2020-06-03 VITALS
TEMPERATURE: 98.6 F | DIASTOLIC BLOOD PRESSURE: 78 MMHG | WEIGHT: 218 LBS | SYSTOLIC BLOOD PRESSURE: 110 MMHG | BODY MASS INDEX: 37.22 KG/M2 | HEIGHT: 64 IN | HEART RATE: 126 BPM

## 2020-06-03 DIAGNOSIS — R53.83 MALAISE AND FATIGUE: ICD-10-CM

## 2020-06-03 DIAGNOSIS — R53.81 MALAISE AND FATIGUE: ICD-10-CM

## 2020-06-03 DIAGNOSIS — F32.A DEPRESSIVE DISORDER: ICD-10-CM

## 2020-06-03 DIAGNOSIS — Z00.00 GENERAL MEDICAL EXAM: Primary | ICD-10-CM

## 2020-06-03 DIAGNOSIS — F25.9 SCHIZOAFFECTIVE DISORDER, UNSPECIFIED TYPE (HCC): ICD-10-CM

## 2020-06-03 PROCEDURE — 83540 ASSAY OF IRON: CPT | Performed by: NURSE PRACTITIONER

## 2020-06-03 PROCEDURE — 85025 COMPLETE CBC W/AUTO DIFF WBC: CPT | Performed by: NURSE PRACTITIONER

## 2020-06-03 PROCEDURE — 82607 VITAMIN B-12: CPT | Performed by: NURSE PRACTITIONER

## 2020-06-03 PROCEDURE — 36415 COLL VENOUS BLD VENIPUNCTURE: CPT | Performed by: NURSE PRACTITIONER

## 2020-06-03 PROCEDURE — 82306 VITAMIN D 25 HYDROXY: CPT | Performed by: NURSE PRACTITIONER

## 2020-06-03 PROCEDURE — 83735 ASSAY OF MAGNESIUM: CPT | Performed by: NURSE PRACTITIONER

## 2020-06-03 PROCEDURE — 80053 COMPREHEN METABOLIC PANEL: CPT | Performed by: NURSE PRACTITIONER

## 2020-06-03 PROCEDURE — 83036 HEMOGLOBIN GLYCOSYLATED A1C: CPT | Performed by: NURSE PRACTITIONER

## 2020-06-03 PROCEDURE — 80061 LIPID PANEL: CPT | Performed by: NURSE PRACTITIONER

## 2020-06-03 PROCEDURE — 99395 PREV VISIT EST AGE 18-39: CPT | Performed by: NURSE PRACTITIONER

## 2020-06-03 PROCEDURE — 84443 ASSAY THYROID STIM HORMONE: CPT | Performed by: NURSE PRACTITIONER

## 2020-06-03 NOTE — PROGRESS NOTES
Chief Complaint   Patient presents with   • General check up     check up      Subjective   Chris Valdez is a 39 y.o. female.     Presents with general exam -followed by mental health seen yesterday-continues to hear voices and increase daily anxiety. See media  report for further information     Anxiety   Presents for follow-up visit. Symptoms include confusion, decreased concentration, depressed mood, excessive worry, insomnia, irritability, nervous/anxious behavior, obsessions, palpitations and panic. Patient reports no chest pain, compulsions, dizziness, feeling of choking, hyperventilation, impotence, malaise, muscle tension, nausea, restlessness, shortness of breath or suicidal ideas. Symptoms occur most days. The severity of symptoms is incapacitating and interfering with daily activities. The quality of sleep is good. Nighttime awakenings: none.     Her past medical history is significant for depression. Compliance with medications is %. Side effects of treatment include GI discomfort, headaches, joint pain and visual problems.   Fatigue   This is a chronic problem. The current episode started more than 1 year ago. The problem occurs daily. The problem has been gradually worsening. Associated symptoms include fatigue and headaches. Pertinent negatives include no abdominal pain, arthralgias, chest pain, chills, congestion, coughing, diaphoresis, fever, joint swelling, myalgias, nausea, neck pain, numbness, sore throat, swollen glands, urinary symptoms, vertigo, vomiting or weakness. She has tried rest for the symptoms. The treatment provided mild relief.   Depression Patient presents with the following symptoms: confusion, decreased concentration, depressed mood, excessive worry, insomnia, irritability, nervousness/anxiety, obsessions, palpitations and panic.  Patient is not experiencing: choking sensation, compulsions, hyperventilation, impotence, malaise, muscle tension, restlessness,  shortness of breath and suicidal ideas.         The following portions of the patient's history were reviewed and updated as appropriate: allergies, current medications, past social history and problem list.    Review of Systems   Constitutional: Positive for activity change, fatigue and irritability. Negative for appetite change, chills, diaphoresis, fever and unexpected weight change.   HENT: Negative for congestion, dental problem, drooling, ear discharge, ear pain, facial swelling, hearing loss, mouth sores, nosebleeds, postnasal drip, rhinorrhea, sinus pressure, sinus pain, sneezing, sore throat, tinnitus, trouble swallowing and voice change.    Eyes: Negative.  Negative for photophobia, pain, discharge, redness, itching and visual disturbance.   Respiratory: Negative.  Negative for apnea, cough, choking, chest tightness, shortness of breath, wheezing and stridor.    Cardiovascular: Positive for palpitations. Negative for chest pain and leg swelling.   Gastrointestinal: Negative.  Negative for abdominal distention, abdominal pain, anal bleeding, blood in stool, constipation, diarrhea, nausea, rectal pain and vomiting.   Endocrine: Negative.  Negative for cold intolerance, heat intolerance, polydipsia, polyphagia and polyuria.   Genitourinary: Negative.  Negative for difficulty urinating, dyspareunia and impotence.   Musculoskeletal: Negative.  Negative for arthralgias, back pain, gait problem, joint swelling, myalgias, neck pain and neck stiffness.   Skin: Negative.  Negative for color change and pallor.   Allergic/Immunologic: Negative.  Negative for environmental allergies, food allergies and immunocompromised state.   Neurological: Positive for light-headedness and headaches. Negative for dizziness, vertigo, tremors, seizures, syncope, facial asymmetry, speech difficulty, weakness and numbness.        HX OF MIGRAINES    Hematological: Negative.  Negative for adenopathy. Does not bruise/bleed easily.  "  Psychiatric/Behavioral: Positive for agitation, behavioral problems, confusion, decreased concentration, dysphoric mood, hallucinations and sleep disturbance. Negative for self-injury and suicidal ideas. The patient is nervous/anxious, has insomnia and is hyperactive.         Increase in mood swings, memory loss, confusing days and nights, sleeping most of the day  Increase in memory loss with recent medication change         Objective   /78   Pulse (!) 126   Temp 98.6 °F (37 °C) (Tympanic)   Ht 162.6 cm (64\")   Wt 98.9 kg (218 lb)   BMI 37.42 kg/m²   Physical Exam   Constitutional: She is oriented to person, place, and time. She appears well-developed and well-nourished. No distress.   Tearful during exam    HENT:   Head: Normocephalic and atraumatic.   Mouth/Throat: No oropharyngeal exudate.   Eyes: Right eye exhibits no discharge. Left eye exhibits no discharge. No scleral icterus.   Cardiovascular: Normal rate and regular rhythm. Exam reveals no gallop and no friction rub.   No murmur heard.  Tachy during exam-patient reports \"having a panic attack\"    Pulmonary/Chest: Effort normal. No stridor. No respiratory distress. She has no wheezes. She has no rales. She exhibits no tenderness.   Abdominal: She exhibits no distension. There is no tenderness.   Neurological: She is alert and oriented to person, place, and time. She displays normal reflexes. No cranial nerve deficit or sensory deficit. She exhibits normal muscle tone. Coordination normal.   Slow speech noted    Skin: No rash noted. She is not diaphoretic. No erythema. No pallor.   Vitals reviewed.      Assessment/Plan   Problem List Items Addressed This Visit        Other    Depressive disorder    Schizoaffective disorder (CMS/HCC)    General medical exam - Primary    Relevant Orders    CBC & Differential (Completed)    Comprehensive Metabolic Panel (Completed)    Hemoglobin A1c (Completed)    Iron (Completed)    Lipid Panel (Completed)    " Vitamin D 25 Hydroxy (Completed)    Vitamin B12 (Completed)    TSH (Completed)    Magnesium (Completed)    CBC Auto Differential (Completed)    Malaise and fatigue         No orders of the defined types were placed in this encounter.      It's not just what you eat, but when you eat  Eat breakfast, and eat smaller meals throughout the day. A healthy breakfast can jumpstart your metabolism, while eating small, healthy meals (rather than the standard three large meals) keeps your energy up.   Avoid eating at night. Try to eat dinner earlier and fast for 14-16 hours until breakfast the next morning. Studies suggest that eating only when you’re most active and giving your digestive system a long break each day may help to regulate weight.     Continue to follow with therapy as directed, diet discussed, meds reviewed-continue to bonnie medication as directed

## 2020-06-04 LAB
25(OH)D3 SERPL-MCNC: 27.4 NG/ML (ref 30–100)
ALBUMIN SERPL-MCNC: 4.4 G/DL (ref 3.5–5.2)
ALBUMIN/GLOB SERPL: 1.4 G/DL
ALP SERPL-CCNC: 47 U/L (ref 39–117)
ALT SERPL W P-5'-P-CCNC: 55 U/L (ref 1–33)
ANION GAP SERPL CALCULATED.3IONS-SCNC: 12.6 MMOL/L (ref 5–15)
AST SERPL-CCNC: 37 U/L (ref 1–32)
BASOPHILS # BLD AUTO: 0.04 10*3/MM3 (ref 0–0.2)
BASOPHILS NFR BLD AUTO: 0.4 % (ref 0–1.5)
BILIRUB SERPL-MCNC: 0.3 MG/DL (ref 0.2–1.2)
BUN BLD-MCNC: 8 MG/DL (ref 6–20)
BUN/CREAT SERPL: 11.3 (ref 7–25)
CALCIUM SPEC-SCNC: 9.4 MG/DL (ref 8.6–10.5)
CHLORIDE SERPL-SCNC: 104 MMOL/L (ref 98–107)
CHOLEST SERPL-MCNC: 191 MG/DL (ref 0–200)
CO2 SERPL-SCNC: 22.4 MMOL/L (ref 22–29)
CREAT BLD-MCNC: 0.71 MG/DL (ref 0.57–1)
DEPRECATED RDW RBC AUTO: 43.5 FL (ref 37–54)
EOSINOPHIL # BLD AUTO: 0.23 10*3/MM3 (ref 0–0.4)
EOSINOPHIL NFR BLD AUTO: 2.5 % (ref 0.3–6.2)
ERYTHROCYTE [DISTWIDTH] IN BLOOD BY AUTOMATED COUNT: 13.1 % (ref 12.3–15.4)
GFR SERPL CREATININE-BSD FRML MDRD: 93 ML/MIN/1.73
GLOBULIN UR ELPH-MCNC: 3.1 GM/DL
GLUCOSE BLD-MCNC: 115 MG/DL (ref 65–99)
HBA1C MFR BLD: 5.4 % (ref 4.8–5.6)
HCT VFR BLD AUTO: 42.7 % (ref 34–46.6)
HDLC SERPL-MCNC: 40 MG/DL (ref 40–60)
HGB BLD-MCNC: 14.6 G/DL (ref 12–15.9)
IMM GRANULOCYTES # BLD AUTO: 0.04 10*3/MM3 (ref 0–0.05)
IMM GRANULOCYTES NFR BLD AUTO: 0.4 % (ref 0–0.5)
IRON 24H UR-MRATE: 41 MCG/DL (ref 37–145)
LDLC SERPL CALC-MCNC: 130 MG/DL (ref 0–100)
LDLC/HDLC SERPL: 3.26 {RATIO}
LYMPHOCYTES # BLD AUTO: 3.05 10*3/MM3 (ref 0.7–3.1)
LYMPHOCYTES NFR BLD AUTO: 33.3 % (ref 19.6–45.3)
MAGNESIUM SERPL-MCNC: 2.2 MG/DL (ref 1.6–2.6)
MCH RBC QN AUTO: 30.9 PG (ref 26.6–33)
MCHC RBC AUTO-ENTMCNC: 34.2 G/DL (ref 31.5–35.7)
MCV RBC AUTO: 90.5 FL (ref 79–97)
MONOCYTES # BLD AUTO: 0.49 10*3/MM3 (ref 0.1–0.9)
MONOCYTES NFR BLD AUTO: 5.3 % (ref 5–12)
NEUTROPHILS # BLD AUTO: 5.31 10*3/MM3 (ref 1.7–7)
NEUTROPHILS NFR BLD AUTO: 58.1 % (ref 42.7–76)
NRBC BLD AUTO-RTO: 0 /100 WBC (ref 0–0.2)
PLATELET # BLD AUTO: 270 10*3/MM3 (ref 140–450)
PMV BLD AUTO: 11.4 FL (ref 6–12)
POTASSIUM BLD-SCNC: 4.4 MMOL/L (ref 3.5–5.2)
PROT SERPL-MCNC: 7.5 G/DL (ref 6–8.5)
RBC # BLD AUTO: 4.72 10*6/MM3 (ref 3.77–5.28)
SODIUM BLD-SCNC: 139 MMOL/L (ref 136–145)
TRIGL SERPL-MCNC: 104 MG/DL (ref 0–150)
TSH SERPL DL<=0.05 MIU/L-ACNC: 1.09 UIU/ML (ref 0.27–4.2)
VIT B12 BLD-MCNC: 475 PG/ML (ref 211–946)
VLDLC SERPL-MCNC: 20.8 MG/DL (ref 5–40)
WBC NRBC COR # BLD: 9.16 10*3/MM3 (ref 3.4–10.8)

## 2020-06-05 RX ORDER — ERGOCALCIFEROL 1.25 MG/1
50000 CAPSULE ORAL
Qty: 4 CAPSULE | Refills: 5 | Status: SHIPPED | OUTPATIENT
Start: 2020-06-05 | End: 2020-10-12

## 2020-10-12 RX ORDER — ERGOCALCIFEROL 1.25 MG/1
50000 CAPSULE ORAL
Qty: 4 CAPSULE | Refills: 5 | Status: SHIPPED | OUTPATIENT
Start: 2020-10-12 | End: 2021-03-25

## 2021-02-22 ENCOUNTER — OFFICE VISIT (OUTPATIENT)
Dept: FAMILY MEDICINE CLINIC | Facility: CLINIC | Age: 41
End: 2021-02-22

## 2021-02-22 ENCOUNTER — LAB (OUTPATIENT)
Dept: LAB | Facility: HOSPITAL | Age: 41
End: 2021-02-22

## 2021-02-22 VITALS
DIASTOLIC BLOOD PRESSURE: 72 MMHG | HEIGHT: 64 IN | TEMPERATURE: 99.4 F | SYSTOLIC BLOOD PRESSURE: 110 MMHG | BODY MASS INDEX: 40.46 KG/M2 | WEIGHT: 237 LBS

## 2021-02-22 DIAGNOSIS — R53.83 MALAISE AND FATIGUE: ICD-10-CM

## 2021-02-22 DIAGNOSIS — L57.0 ACTINIC KERATOSIS: Primary | ICD-10-CM

## 2021-02-22 DIAGNOSIS — N39.0 URINARY TRACT INFECTION WITHOUT HEMATURIA, SITE UNSPECIFIED: ICD-10-CM

## 2021-02-22 DIAGNOSIS — J06.9 UPPER RESPIRATORY TRACT INFECTION, UNSPECIFIED TYPE: ICD-10-CM

## 2021-02-22 DIAGNOSIS — F32.A DEPRESSION, UNSPECIFIED DEPRESSION TYPE: ICD-10-CM

## 2021-02-22 DIAGNOSIS — R63.5 WEIGHT GAIN: ICD-10-CM

## 2021-02-22 DIAGNOSIS — Z86.018 HISTORY OF CHANGING SKIN MOLE: ICD-10-CM

## 2021-02-22 DIAGNOSIS — F41.9 ANXIETY: ICD-10-CM

## 2021-02-22 DIAGNOSIS — R53.81 MALAISE AND FATIGUE: ICD-10-CM

## 2021-02-22 LAB
25(OH)D3 SERPL-MCNC: 38.2 NG/ML
ALBUMIN SERPL-MCNC: 4.2 G/DL (ref 3.5–5.2)
ALBUMIN UR-MCNC: <1.2 MG/DL
ALBUMIN/GLOB SERPL: 1.3 G/DL
ALP SERPL-CCNC: 54 U/L (ref 39–117)
ALT SERPL W P-5'-P-CCNC: 29 U/L (ref 1–33)
ANION GAP SERPL CALCULATED.3IONS-SCNC: 10.1 MMOL/L (ref 5–15)
AST SERPL-CCNC: 25 U/L (ref 1–32)
BASOPHILS # BLD AUTO: 0.06 10*3/MM3 (ref 0–0.2)
BASOPHILS NFR BLD AUTO: 0.5 % (ref 0–1.5)
BILIRUB BLD-MCNC: NEGATIVE MG/DL
BILIRUB SERPL-MCNC: <0.2 MG/DL (ref 0–1.2)
BUN SERPL-MCNC: 13 MG/DL (ref 6–20)
BUN/CREAT SERPL: 21 (ref 7–25)
CALCIUM SPEC-SCNC: 9.3 MG/DL (ref 8.6–10.5)
CHLORIDE SERPL-SCNC: 105 MMOL/L (ref 98–107)
CHOLEST SERPL-MCNC: 183 MG/DL (ref 0–200)
CLARITY, POC: ABNORMAL
CO2 SERPL-SCNC: 22.9 MMOL/L (ref 22–29)
COLOR UR: YELLOW
CREAT SERPL-MCNC: 0.62 MG/DL (ref 0.57–1)
DEPRECATED RDW RBC AUTO: 45.4 FL (ref 37–54)
EOSINOPHIL # BLD AUTO: 0.17 10*3/MM3 (ref 0–0.4)
EOSINOPHIL NFR BLD AUTO: 1.3 % (ref 0.3–6.2)
ERYTHROCYTE [DISTWIDTH] IN BLOOD BY AUTOMATED COUNT: 13 % (ref 12.3–15.4)
GFR SERPL CREATININE-BSD FRML MDRD: 108 ML/MIN/1.73
GLOBULIN UR ELPH-MCNC: 3.3 GM/DL
GLUCOSE SERPL-MCNC: 91 MG/DL (ref 65–99)
GLUCOSE UR STRIP-MCNC: NEGATIVE MG/DL
HBA1C MFR BLD: 5.69 % (ref 4.8–5.6)
HCT VFR BLD AUTO: 44.9 % (ref 34–46.6)
HDLC SERPL-MCNC: 40 MG/DL (ref 40–60)
HGB BLD-MCNC: 14.9 G/DL (ref 12–15.9)
IMM GRANULOCYTES # BLD AUTO: 0.05 10*3/MM3 (ref 0–0.05)
IMM GRANULOCYTES NFR BLD AUTO: 0.4 % (ref 0–0.5)
IRON 24H UR-MRATE: 52 MCG/DL (ref 37–145)
KETONES UR QL: NEGATIVE
LDLC SERPL CALC-MCNC: 116 MG/DL (ref 0–100)
LDLC/HDLC SERPL: 2.81 {RATIO}
LEUKOCYTE EST, POC: ABNORMAL
LYMPHOCYTES # BLD AUTO: 3.01 10*3/MM3 (ref 0.7–3.1)
LYMPHOCYTES NFR BLD AUTO: 23.6 % (ref 19.6–45.3)
MAGNESIUM SERPL-MCNC: 1.9 MG/DL (ref 1.6–2.6)
MCH RBC QN AUTO: 31.4 PG (ref 26.6–33)
MCHC RBC AUTO-ENTMCNC: 33.2 G/DL (ref 31.5–35.7)
MCV RBC AUTO: 94.5 FL (ref 79–97)
MONOCYTES # BLD AUTO: 0.57 10*3/MM3 (ref 0.1–0.9)
MONOCYTES NFR BLD AUTO: 4.5 % (ref 5–12)
NEUTROPHILS NFR BLD AUTO: 69.7 % (ref 42.7–76)
NEUTROPHILS NFR BLD AUTO: 8.92 10*3/MM3 (ref 1.7–7)
NITRITE UR-MCNC: POSITIVE MG/ML
NRBC BLD AUTO-RTO: 0 /100 WBC (ref 0–0.2)
PH UR: 5 [PH] (ref 5–8)
PLAT MORPH BLD: NORMAL
PLATELET # BLD AUTO: 262 10*3/MM3 (ref 140–450)
PMV BLD AUTO: 11.6 FL (ref 6–12)
POTASSIUM SERPL-SCNC: 4.7 MMOL/L (ref 3.5–5.2)
PROT SERPL-MCNC: 7.5 G/DL (ref 6–8.5)
PROT UR STRIP-MCNC: NEGATIVE MG/DL
RBC # BLD AUTO: 4.75 10*6/MM3 (ref 3.77–5.28)
RBC # UR STRIP: NEGATIVE /UL
RBC MORPH BLD: NORMAL
SODIUM SERPL-SCNC: 138 MMOL/L (ref 136–145)
SP GR UR: 1.01 (ref 1–1.03)
TRIGL SERPL-MCNC: 154 MG/DL (ref 0–150)
TSH SERPL DL<=0.05 MIU/L-ACNC: 1.23 UIU/ML (ref 0.27–4.2)
UROBILINOGEN UR QL: NORMAL
VIT B12 BLD-MCNC: 277 PG/ML (ref 211–946)
VLDLC SERPL-MCNC: 27 MG/DL (ref 5–40)
WBC # BLD AUTO: 12.78 10*3/MM3 (ref 3.4–10.8)
WBC MORPH BLD: NORMAL

## 2021-02-22 PROCEDURE — 85025 COMPLETE CBC W/AUTO DIFF WBC: CPT | Performed by: NURSE PRACTITIONER

## 2021-02-22 PROCEDURE — 82043 UR ALBUMIN QUANTITATIVE: CPT | Performed by: NURSE PRACTITIONER

## 2021-02-22 PROCEDURE — 83540 ASSAY OF IRON: CPT | Performed by: NURSE PRACTITIONER

## 2021-02-22 PROCEDURE — 80061 LIPID PANEL: CPT | Performed by: NURSE PRACTITIONER

## 2021-02-22 PROCEDURE — 83735 ASSAY OF MAGNESIUM: CPT | Performed by: NURSE PRACTITIONER

## 2021-02-22 PROCEDURE — 96372 THER/PROPH/DIAG INJ SC/IM: CPT | Performed by: NURSE PRACTITIONER

## 2021-02-22 PROCEDURE — 36415 COLL VENOUS BLD VENIPUNCTURE: CPT | Performed by: NURSE PRACTITIONER

## 2021-02-22 PROCEDURE — 82607 VITAMIN B-12: CPT | Performed by: NURSE PRACTITIONER

## 2021-02-22 PROCEDURE — 83036 HEMOGLOBIN GLYCOSYLATED A1C: CPT | Performed by: NURSE PRACTITIONER

## 2021-02-22 PROCEDURE — 99214 OFFICE O/P EST MOD 30 MIN: CPT | Performed by: NURSE PRACTITIONER

## 2021-02-22 PROCEDURE — 82306 VITAMIN D 25 HYDROXY: CPT | Performed by: NURSE PRACTITIONER

## 2021-02-22 PROCEDURE — 87186 SC STD MICRODIL/AGAR DIL: CPT | Performed by: NURSE PRACTITIONER

## 2021-02-22 PROCEDURE — 85007 BL SMEAR W/DIFF WBC COUNT: CPT | Performed by: NURSE PRACTITIONER

## 2021-02-22 PROCEDURE — 80053 COMPREHEN METABOLIC PANEL: CPT | Performed by: NURSE PRACTITIONER

## 2021-02-22 PROCEDURE — 84443 ASSAY THYROID STIM HORMONE: CPT | Performed by: NURSE PRACTITIONER

## 2021-02-22 PROCEDURE — 87088 URINE BACTERIA CULTURE: CPT | Performed by: NURSE PRACTITIONER

## 2021-02-22 PROCEDURE — 87086 URINE CULTURE/COLONY COUNT: CPT | Performed by: NURSE PRACTITIONER

## 2021-02-22 RX ORDER — CLARITHROMYCIN 500 MG/1
500 TABLET, COATED ORAL 2 TIMES DAILY
Qty: 14 TABLET | Refills: 0 | OUTPATIENT
Start: 2021-02-22 | End: 2021-03-23

## 2021-02-22 RX ORDER — CEFTRIAXONE 1 G/1
1 INJECTION, POWDER, FOR SOLUTION INTRAMUSCULAR; INTRAVENOUS ONCE
Status: COMPLETED | OUTPATIENT
Start: 2021-02-22 | End: 2021-02-22

## 2021-02-22 RX ORDER — GABAPENTIN 100 MG/1
100 CAPSULE ORAL 3 TIMES DAILY
COMMUNITY
Start: 2021-02-18 | End: 2022-09-09

## 2021-02-22 RX ADMIN — CEFTRIAXONE 1 G: 1 INJECTION, POWDER, FOR SOLUTION INTRAMUSCULAR; INTRAVENOUS at 11:53

## 2021-02-22 NOTE — PROGRESS NOTES
Chief Complaint   Patient presents with   • Suspicious Skin Lesion     left leg   • Weight Gain     Subjective   Chris Valdez is a 40 y.o. female.     Presents with general exam -reports changing moles and weight gain -cough and congestion     Anxiety  Presents for follow-up visit. Symptoms include confusion, decreased concentration, depressed mood, excessive worry, insomnia, irritability, nervous/anxious behavior, obsessions, palpitations and panic. Patient reports no chest pain, compulsions, dizziness, feeling of choking, hyperventilation, impotence, malaise, muscle tension, nausea, restlessness, shortness of breath or suicidal ideas. Symptoms occur most days. The severity of symptoms is incapacitating and interfering with daily activities. The quality of sleep is good. Nighttime awakenings: none.     Her past medical history is significant for depression. There is no history of asthma. Compliance with medications is %. Side effects of treatment include GI discomfort, headaches, joint pain and visual problems.   Fatigue  This is a chronic problem. The current episode started more than 1 year ago. The problem occurs daily. The problem has been gradually worsening. Associated symptoms include congestion, fatigue, headaches and a rash. Pertinent negatives include no abdominal pain, arthralgias, chest pain, chills, coughing, diaphoresis, fever, joint swelling, myalgias, nausea, neck pain, numbness, sore throat, swollen glands, urinary symptoms, vertigo, vomiting or weakness. She has tried rest for the symptoms. The treatment provided mild relief.   DepressionPatient presents with the following symptoms: confusion, decreased concentration, depressed mood, excessive worry, insomnia, irritability, nervousness/anxiety, obsessions, palpitations and panic.  Patient is not experiencing: choking sensation, compulsions, hyperventilation, impotence, malaise, muscle tension, restlessness, shortness of breath and  suicidal ideas.  No history of: asthma    Obesity  Associated symptoms include congestion, fatigue, headaches and a rash. Pertinent negatives include no abdominal pain, arthralgias, chest pain, chills, coughing, diaphoresis, fever, joint swelling, myalgias, nausea, neck pain, numbness, sore throat, swollen glands, urinary symptoms, vertigo, vomiting or weakness.   Rash  This is a recurrent problem. The problem has been gradually worsening since onset. Location: left knee. Associated symptoms include congestion, fatigue and rhinorrhea. Pertinent negatives include no cough, diarrhea, eye pain, fever, shortness of breath, sore throat or vomiting. The treatment provided mild relief. There is no history of allergies, asthma, eczema or varicella.   Urinary Tract Infection   This is a new problem. The current episode started 1 to 4 weeks ago. The problem has been gradually worsening. The quality of the pain is described as burning. The pain is at a severity of 2/10. The pain is mild. There has been no fever. She is sexually active. There is no history of pyelonephritis. Associated symptoms include frequency, hesitancy and urgency. Pertinent negatives include no chills, discharge, flank pain, hematuria, nausea, sweats or vomiting. She has tried home medications and increased fluids for the symptoms. The treatment provided mild relief. Her past medical history is significant for recurrent UTIs. There is no history of catheterization, kidney stones, a single kidney, urinary stasis or a urological procedure.        The following portions of the patient's history were reviewed and updated as appropriate: allergies, current medications, past social history and problem list.    Review of Systems   Constitutional: Positive for activity change, fatigue, irritability and unexpected weight change. Negative for appetite change, chills, diaphoresis and fever.   HENT: Positive for congestion, postnasal drip, rhinorrhea, sinus pressure  and sinus pain. Negative for dental problem, drooling, ear discharge, ear pain, facial swelling, hearing loss, mouth sores, nosebleeds, sneezing, sore throat, tinnitus, trouble swallowing and voice change.    Eyes: Negative.  Negative for photophobia, pain, discharge, redness, itching and visual disturbance.   Respiratory: Negative.  Negative for apnea, cough, choking, chest tightness, shortness of breath, wheezing and stridor.    Cardiovascular: Positive for palpitations. Negative for chest pain and leg swelling.   Gastrointestinal: Negative.  Negative for abdominal distention, abdominal pain, anal bleeding, blood in stool, constipation, diarrhea, nausea, rectal pain and vomiting.   Endocrine: Negative.  Negative for cold intolerance, heat intolerance, polydipsia, polyphagia and polyuria.   Genitourinary: Positive for difficulty urinating, dysuria, frequency, hesitancy and urgency. Negative for dyspareunia, enuresis, flank pain, hematuria and impotence.   Musculoskeletal: Negative.  Negative for arthralgias, back pain, gait problem, joint swelling, myalgias, neck pain and neck stiffness.   Skin: Positive for rash and wound. Negative for color change and pallor.        Wart left knee    Allergic/Immunologic: Negative.  Negative for environmental allergies, food allergies and immunocompromised state.   Neurological: Positive for light-headedness and headaches. Negative for dizziness, vertigo, tremors, seizures, syncope, facial asymmetry, speech difficulty, weakness and numbness.        HX OF MIGRAINES    Hematological: Negative.  Negative for adenopathy. Does not bruise/bleed easily.   Psychiatric/Behavioral: Positive for agitation, behavioral problems, confusion, decreased concentration, dysphoric mood, hallucinations and sleep disturbance. Negative for self-injury and suicidal ideas. The patient is nervous/anxious, has insomnia and is hyperactive.        Objective   /72   Temp 99.4 °F (37.4 °C) (Tympanic)    "Ht 162.6 cm (64\")   Wt 108 kg (237 lb)   BMI 40.68 kg/m²   Physical Exam  Vitals signs and nursing note reviewed.   Constitutional:       General: She is not in acute distress.     Appearance: Normal appearance. She is normal weight. She is not ill-appearing, toxic-appearing or diaphoretic.   HENT:      Head: Normocephalic and atraumatic.      Right Ear: There is no impacted cerumen.      Left Ear: There is no impacted cerumen.      Nose: Congestion and rhinorrhea present.      Mouth/Throat:      Mouth: Mucous membranes are moist.   Eyes:      General: No scleral icterus.        Right eye: No discharge.         Left eye: No discharge.   Neck:      Musculoskeletal: Normal range of motion and neck supple. No neck rigidity or muscular tenderness.      Vascular: No carotid bruit.   Cardiovascular:      Rate and Rhythm: Normal rate and regular rhythm.      Pulses: Normal pulses.      Heart sounds: Normal heart sounds. No murmur. No friction rub. No gallop.    Pulmonary:      Effort: Pulmonary effort is normal. No respiratory distress.      Breath sounds: Normal breath sounds. No stridor. No wheezing, rhonchi or rales.   Chest:      Chest wall: No tenderness.   Abdominal:      General: Abdomen is flat. There is no distension.      Palpations: There is no mass.      Tenderness: There is no abdominal tenderness. There is no right CVA tenderness, left CVA tenderness, guarding or rebound.      Hernia: No hernia is present.   Musculoskeletal: Normal range of motion.   Lymphadenopathy:      Cervical: No cervical adenopathy.   Skin:     General: Skin is warm.      Coloration: Skin is not jaundiced or pale.      Findings: Rash present. No bruising, erythema or lesion.      Comments: Wart left knee     Used liquid nitrogen to freeze x 3 freezes-lesion-instructed on wound care follow up    Neurological:      Mental Status: She is alert and oriented to person, place, and time.      Cranial Nerves: No cranial nerve deficit.      " Sensory: No sensory deficit.      Motor: No weakness.      Coordination: Coordination normal.      Gait: Gait normal.      Deep Tendon Reflexes: Reflexes normal.         Assessment/Plan   Problems Addressed this Visit        Mental Health    Depression    Anxiety    Relevant Orders    MicroAlbumin, Urine, Random - Urine, Clean Catch       Symptoms and Signs    Malaise and fatigue    Relevant Orders    CBC & Differential    Comprehensive Metabolic Panel    Hemoglobin A1c    Iron    Lipid Panel    Vitamin D 25 Hydroxy    Vitamin B12    TSH    Magnesium    MicroAlbumin, Urine, Random - Urine, Clean Catch      Other Visit Diagnoses     Actinic keratosis    -  Primary    Relevant Orders    Cryotherapy, Skin Lesion    History of changing skin mole        Relevant Orders    CBC & Differential    Comprehensive Metabolic Panel    Hemoglobin A1c    Iron    Lipid Panel    Vitamin D 25 Hydroxy    Vitamin B12    TSH    Magnesium    MicroAlbumin, Urine, Random - Urine, Clean Catch    Weight gain        Relevant Orders    CBC & Differential    Comprehensive Metabolic Panel    Hemoglobin A1c    Iron    Lipid Panel    Vitamin D 25 Hydroxy    Vitamin B12    TSH    Magnesium    MicroAlbumin, Urine, Random - Urine, Clean Catch    Upper respiratory tract infection, unspecified type        Relevant Medications    clarithromycin (Biaxin) 500 MG tablet    Urinary tract infection without hematuria, site unspecified        Relevant Medications    clarithromycin (Biaxin) 500 MG tablet    cefTRIAXone (ROCEPHIN) injection 1 g    Other Relevant Orders    POCT urinalysis dipstick, manual (Completed)    Urine Culture - Urine, Urine, Clean Catch      Diagnoses       Codes Comments    Actinic keratosis    -  Primary ICD-10-CM: L57.0  ICD-9-CM: 702.0     History of changing skin mole     ICD-10-CM: Z86.018  ICD-9-CM: V13.3     Malaise and fatigue     ICD-10-CM: R53.81, R53.83  ICD-9-CM: 780.79     Weight gain     ICD-10-CM: R63.5  ICD-9-CM: 783.1      Anxiety     ICD-10-CM: F41.9  ICD-9-CM: 300.00     Depression, unspecified depression type     ICD-10-CM: F32.9  ICD-9-CM: 311     Upper respiratory tract infection, unspecified type     ICD-10-CM: J06.9  ICD-9-CM: 465.9     Urinary tract infection without hematuria, site unspecified     ICD-10-CM: N39.0  ICD-9-CM: 599.0            New Medications Ordered This Visit   Medications   • clarithromycin (Biaxin) 500 MG tablet     Sig: Take 1 tablet by mouth 2 (Two) Times a Day.     Dispense:  14 tablet     Refill:  0   • cefTRIAXone (ROCEPHIN) injection 1 g       It's not just what you eat, but when you eat  Eat breakfast, and eat smaller meals throughout the day. A healthy breakfast can jumpstart your metabolism, while eating small, healthy meals (rather than the standard three large meals) keeps your energy up.   Avoid eating at night. Try to eat dinner earlier and fast for 14-16 hours until breakfast the next morning. Studies suggest that eating only when you’re most active and giving your digestive system a long break each day may help to regulate weight.     Rocephin as directed, meds as directed, diet discussed, biaxin for uri, labs today -add mucinex prn

## 2021-02-24 ENCOUNTER — TELEPHONE (OUTPATIENT)
Dept: FAMILY MEDICINE CLINIC | Facility: CLINIC | Age: 41
End: 2021-02-24

## 2021-02-24 LAB — BACTERIA SPEC AEROBE CULT: ABNORMAL

## 2021-02-24 RX ORDER — SULFAMETHOXAZOLE AND TRIMETHOPRIM 800; 160 MG/1; MG/1
1 TABLET ORAL 2 TIMES DAILY
Qty: 20 TABLET | Refills: 0 | OUTPATIENT
Start: 2021-02-24 | End: 2021-03-23

## 2021-02-24 NOTE — TELEPHONE ENCOUNTER
Per ADEOLA Gallagher, Ms. Valdez has been called with recent lab results & recommendations.  Continue current medications and follow-up as planned or sooner if any problems.        ----- Message from ADEOLA Berrios sent at 2/23/2021  3:20 PM CST -----  Regarding: FW:  Can you let her know labs are good except her A1c is a little elevated. Can consider metformin 500mg daily otherwise good labs  ----- Message -----  From: Karen Sumner MA  Sent: 2/22/2021  11:50 AM CST  To: ADEOLA Berrios

## 2021-02-24 NOTE — PROGRESS NOTES
Per ADEOLA Gallagher, Ms. Valdez has been called with recent lab results & recommendations.  Continue current medications and follow-up as planned or sooner if any problems.

## 2021-02-25 ENCOUNTER — TELEPHONE (OUTPATIENT)
Dept: FAMILY MEDICINE CLINIC | Facility: CLINIC | Age: 41
End: 2021-02-25

## 2021-03-23 PROBLEM — R11.0 NAUSEA: Status: ACTIVE | Noted: 2021-03-23

## 2021-03-25 RX ORDER — ERGOCALCIFEROL 1.25 MG/1
50000 CAPSULE ORAL
Qty: 12 CAPSULE | Refills: 1 | Status: SHIPPED | OUTPATIENT
Start: 2021-03-25 | End: 2022-09-09

## 2021-04-30 ENCOUNTER — OFFICE VISIT (OUTPATIENT)
Dept: FAMILY MEDICINE CLINIC | Facility: CLINIC | Age: 41
End: 2021-04-30

## 2021-04-30 VITALS
HEIGHT: 64 IN | TEMPERATURE: 99 F | BODY MASS INDEX: 40.8 KG/M2 | SYSTOLIC BLOOD PRESSURE: 110 MMHG | DIASTOLIC BLOOD PRESSURE: 90 MMHG | WEIGHT: 239 LBS

## 2021-04-30 DIAGNOSIS — R73.09 BLOOD SUGAR INCREASED: Primary | ICD-10-CM

## 2021-04-30 DIAGNOSIS — F41.9 ANXIETY: ICD-10-CM

## 2021-04-30 DIAGNOSIS — R73.9 HYPERGLYCEMIA: ICD-10-CM

## 2021-04-30 PROCEDURE — 99213 OFFICE O/P EST LOW 20 MIN: CPT | Performed by: NURSE PRACTITIONER

## 2021-04-30 NOTE — PROGRESS NOTES
Chief Complaint   Patient presents with   • Check up     after starting the metformin     Subjective   Chris Valdez is a 40 y.o. female.     Presents with recheck of elevated blood sugar     Anxiety  Presents for follow-up visit. Symptoms include confusion, decreased concentration, depressed mood, excessive worry, insomnia, irritability, nervous/anxious behavior, obsessions, palpitations and panic. Patient reports no chest pain, compulsions, dizziness, dry mouth, feeling of choking, hyperventilation, impotence, malaise, muscle tension, nausea, restlessness, shortness of breath or suicidal ideas. Symptoms occur most days. The severity of symptoms is incapacitating and interfering with daily activities. The quality of sleep is good. Nighttime awakenings: none.     Her past medical history is significant for depression. Compliance with medications is %. Side effects of treatment include GI discomfort, headaches, joint pain and visual problems.   Fatigue  This is a chronic problem. The current episode started more than 1 year ago. The problem occurs daily. The problem has been gradually worsening. Associated symptoms include fatigue and headaches. Pertinent negatives include no abdominal pain, arthralgias, chest pain, chills, congestion, coughing, diaphoresis, fever, joint swelling, myalgias, nausea, neck pain, numbness, rash, sore throat, swollen glands, urinary symptoms, vertigo, vomiting or weakness. She has tried rest for the symptoms. The treatment provided mild relief.   DepressionPatient presents with the following symptoms: confusion, decreased concentration, depressed mood, excessive worry, insomnia, irritability, nervousness/anxiety, obsessions, palpitations and panic.  Patient is not experiencing: choking sensation, compulsions, dry mouth, hyperventilation, impotence, malaise, muscle tension, restlessness, shortness of breath and suicidal ideas.    Blood Sugar Problem  This is a chronic problem.  The problem occurs daily. The problem has been gradually worsening. Associated symptoms include fatigue and headaches. Pertinent negatives include no abdominal pain, arthralgias, chest pain, chills, congestion, coughing, diaphoresis, fever, joint swelling, myalgias, nausea, neck pain, numbness, rash, sore throat, swollen glands, urinary symptoms, vertigo, vomiting or weakness. Treatments tried: metformin         The following portions of the patient's history were reviewed and updated as appropriate: allergies, current medications, past social history and problem list.    Review of Systems   Constitutional: Positive for activity change, fatigue and irritability. Negative for appetite change, chills, diaphoresis, fever and unexpected weight change.   HENT: Negative for congestion, dental problem, drooling, ear discharge, ear pain, facial swelling, hearing loss, mouth sores, nosebleeds, postnasal drip, rhinorrhea, sinus pressure, sinus pain, sneezing, sore throat, tinnitus, trouble swallowing and voice change.    Eyes: Negative.  Negative for photophobia, pain, discharge, redness, itching and visual disturbance.   Respiratory: Negative.  Negative for apnea, cough, choking, chest tightness, shortness of breath, wheezing and stridor.    Cardiovascular: Positive for palpitations. Negative for chest pain and leg swelling.   Gastrointestinal: Negative.  Negative for abdominal distention, abdominal pain, anal bleeding, blood in stool, constipation, diarrhea, nausea, rectal pain and vomiting.   Endocrine: Negative.  Negative for cold intolerance, heat intolerance, polydipsia, polyphagia and polyuria.   Genitourinary: Negative.  Negative for difficulty urinating, dyspareunia and impotence.   Musculoskeletal: Negative.  Negative for arthralgias, back pain, gait problem, joint swelling, myalgias, neck pain and neck stiffness.   Skin: Negative.  Negative for color change, pallor, rash and wound.   Allergic/Immunologic: Negative.  " Negative for environmental allergies, food allergies and immunocompromised state.   Neurological: Positive for light-headedness and headaches. Negative for dizziness, vertigo, tremors, seizures, syncope, facial asymmetry, speech difficulty, weakness and numbness.        HX OF MIGRAINES    Hematological: Negative.  Negative for adenopathy. Does not bruise/bleed easily.   Psychiatric/Behavioral: Positive for agitation, behavioral problems, confusion, decreased concentration, dysphoric mood, hallucinations and sleep disturbance. Negative for self-injury and suicidal ideas. The patient is nervous/anxious, has insomnia and is hyperactive.                  Objective   /90   Temp 99 °F (37.2 °C) (Tympanic)   Ht 162.6 cm (64\")   Wt 108 kg (239 lb)   BMI 41.02 kg/m²   Physical Exam  Vitals and nursing note reviewed.   Constitutional:       General: She is not in acute distress.     Appearance: Normal appearance. She is normal weight. She is not ill-appearing, toxic-appearing or diaphoretic.   HENT:      Head: Normocephalic and atraumatic.      Right Ear: There is no impacted cerumen.      Left Ear: There is no impacted cerumen.      Nose: Congestion and rhinorrhea present.      Mouth/Throat:      Mouth: Mucous membranes are moist.      Pharynx: No oropharyngeal exudate or posterior oropharyngeal erythema.   Eyes:      General: No scleral icterus.        Right eye: No discharge.         Left eye: No discharge.   Neck:      Vascular: No carotid bruit.   Cardiovascular:      Rate and Rhythm: Normal rate and regular rhythm.      Pulses: Normal pulses.      Heart sounds: Normal heart sounds. No murmur heard.   No friction rub. No gallop.    Pulmonary:      Effort: Pulmonary effort is normal. No respiratory distress.      Breath sounds: Normal breath sounds. No stridor. No wheezing, rhonchi or rales.   Chest:      Chest wall: No tenderness.   Abdominal:      General: Abdomen is flat. There is no distension.      " Palpations: There is no mass.      Tenderness: There is no abdominal tenderness. There is no right CVA tenderness, left CVA tenderness, guarding or rebound.      Hernia: No hernia is present.   Musculoskeletal:         General: No swelling, tenderness, deformity or signs of injury. Normal range of motion.      Cervical back: Normal range of motion and neck supple. No rigidity. No muscular tenderness.      Right lower leg: No edema.      Left lower leg: No edema.   Lymphadenopathy:      Cervical: No cervical adenopathy.   Skin:     General: Skin is warm.      Coloration: Skin is not jaundiced or pale.      Findings: Rash present. No bruising, erythema or lesion.      Comments:       Neurological:      General: No focal deficit present.      Mental Status: She is alert and oriented to person, place, and time.      Cranial Nerves: No cranial nerve deficit.      Sensory: No sensory deficit.      Motor: No weakness.      Coordination: Coordination normal.      Gait: Gait normal.      Deep Tendon Reflexes: Reflexes normal.   Psychiatric:         Mood and Affect: Mood normal.         Behavior: Behavior normal.         Assessment/Plan   Problems Addressed this Visit        Mental Health    Anxiety      Other Visit Diagnoses     Blood sugar increased    -  Primary    Relevant Medications    Semaglutide(0.25 or 0.5MG/DOS) (OZEMPIC) solution pen-injector 0.25 mg    Hyperglycemia        Relevant Medications    Semaglutide(0.25 or 0.5MG/DOS) (OZEMPIC) solution pen-injector 0.25 mg      Diagnoses       Codes Comments    Blood sugar increased    -  Primary ICD-10-CM: R73.09  ICD-9-CM: 790.29     Hyperglycemia     ICD-10-CM: R73.9  ICD-9-CM: 790.29     Anxiety     ICD-10-CM: F41.9  ICD-9-CM: 300.00            New Medications Ordered This Visit   Medications   • Semaglutide(0.25 or 0.5MG/DOS) (OZEMPIC) solution pen-injector 0.25 mg   • Semaglutide,0.25 or 0.5MG/DOS, (OZEMPIC) 2 MG/1.5ML solution pen-injector     Sig: Inject 0.25 mg  under the skin into the appropriate area as directed 1 (One) Time Per Week.     Dispense:  4 pen     Refill:  11        ozempic weekly-then will switch to farxiga if not covered     Patient has tried and failed on metformin -stop metformin and will try ozempic -patient agrees, diet and exercise discussed in length, patient agrees

## 2021-05-04 ENCOUNTER — TELEPHONE (OUTPATIENT)
Dept: FAMILY MEDICINE CLINIC | Facility: CLINIC | Age: 41
End: 2021-05-04

## 2021-05-13 RX ORDER — PEN NEEDLE, DIABETIC 30 GX3/16"
NEEDLE, DISPOSABLE MISCELLANEOUS
Qty: 90 EACH | Refills: 3 | Status: SHIPPED | OUTPATIENT
Start: 2021-05-13

## 2021-05-28 ENCOUNTER — TELEPHONE (OUTPATIENT)
Dept: FAMILY MEDICINE CLINIC | Facility: CLINIC | Age: 41
End: 2021-05-28

## 2021-07-13 ENCOUNTER — TELEPHONE (OUTPATIENT)
Dept: FAMILY MEDICINE CLINIC | Facility: CLINIC | Age: 41
End: 2021-07-13

## 2021-07-13 NOTE — TELEPHONE ENCOUNTER
PT HAS CALLED REQUESTING TO UP HER VICTOZA TO 1.2 MG     SENT TO [facility]     BEVERLY PHELAN  [phone]

## 2021-07-14 ENCOUNTER — TELEPHONE (OUTPATIENT)
Dept: FAMILY MEDICINE CLINIC | Facility: CLINIC | Age: 41
End: 2021-07-14

## 2021-07-14 RX ORDER — LIRAGLUTIDE 6 MG/ML
1.2 INJECTION SUBCUTANEOUS DAILY
Qty: 3 PEN | Refills: 11 | Status: SHIPPED | OUTPATIENT
Start: 2021-07-14 | End: 2022-09-09

## 2021-07-14 NOTE — TELEPHONE ENCOUNTER
PT CALLED ABOUT VICTOZA RX. WANTS TO INCREASE DOSAGE TO 1.2MG.    PLEASE CONTACT PT WITH QUESTIONS OR CONCERNS.    REFILL TO BE SENT TO [facility].

## 2021-08-16 PROCEDURE — 87635 SARS-COV-2 COVID-19 AMP PRB: CPT | Performed by: NURSE PRACTITIONER

## 2021-09-21 ENCOUNTER — OFFICE VISIT (OUTPATIENT)
Dept: PODIATRY | Facility: CLINIC | Age: 41
End: 2021-09-21

## 2021-09-21 VITALS
HEART RATE: 112 BPM | HEIGHT: 64 IN | BODY MASS INDEX: 35.85 KG/M2 | DIASTOLIC BLOOD PRESSURE: 77 MMHG | SYSTOLIC BLOOD PRESSURE: 111 MMHG | OXYGEN SATURATION: 99 % | WEIGHT: 210 LBS

## 2021-09-21 DIAGNOSIS — S92.415A CLOSED NONDISPLACED FRACTURE OF PROXIMAL PHALANX OF LEFT GREAT TOE, INITIAL ENCOUNTER: ICD-10-CM

## 2021-09-21 DIAGNOSIS — S99.911A INJURY OF RIGHT ANKLE, INITIAL ENCOUNTER: Primary | ICD-10-CM

## 2021-09-21 PROCEDURE — 28490 TREAT BIG TOE FRACTURE: CPT | Performed by: PODIATRIST

## 2021-09-21 PROCEDURE — 99203 OFFICE O/P NEW LOW 30 MIN: CPT | Performed by: PODIATRIST

## 2021-09-21 RX ORDER — ARIPIPRAZOLE 5 MG/1
5 TABLET ORAL EVERY MORNING
COMMUNITY
Start: 2021-08-13 | End: 2021-09-21

## 2021-09-21 NOTE — PROGRESS NOTES
Chris Valdez  1980  41 y.o. female     Patient came to clinic bilateral foot pain. Xray on 9/16/2021 09/21/2021    Chief Complaint   Patient presents with   • Right Foot - Pain   • Left Foot - Pain       History of Present Illness    Chris Valdez is a 41 y.o.female who presents to clinic today as a referral from the urgent care for bilateral lower extremity injuries.  Patient fell off the back steps on September 16, 2021.  During the fall she twisted her right ankle and injured her left foot.  [facility]  She is currently ambulating in a cam boot on the right and a surgical shoe on left.  Surgical shoe on the left is ill fitting.  She rates her pain as an 8 out of 10.    Past Medical History:   Diagnosis Date   • Abdominal pain    • Acute bronchitis    • Acute upper respiratory infection    • Anxiety state    • Backache    • Bell's palsy    • Cellulitis      left breast      • Chest wall pain     right lateral chest wall-poss early shingles      • Chest wall pain 10/21/2015    right lateral chest wall-poss early shingles      • Chronic low back pain     May 13 2014 MRI ddd with mild left neuroforminal narrowing l4-l5      • Chronic retention of urine     ACUTE   • Cough    • Depressive disorder    • Dysuria    • Facial hemiparesis (CMS/HCC)    • Facial hemiparesis (CMS/HCC)    • Fatigue    • Fatigue    • Fever    • Ganglion cyst     LEFT BREAST   • Generalized anxiety disorder    • Headache    • Insomnia    • Migraine    • Pain and other symptoms associated with female genital organs    • Pain radiating to back     LUMBAR   • Postnasal drip    • Retention of urine    • Schizophrenia (CMS/HCC)    • Skin sensation disturbance    • Urinary incontinence    • Urinary tract infectious disease    • Viral gastroenteritis    • Vulvovaginitis          Past Surgical History:   Procedure Laterality Date   • ANKLE OPEN REDUCTION INTERNAL FIXATION Left 4/6/2017    Procedure:  "ANKLE OPEN REDUCTION INTERNAL FIXATION;  Surgeon: Leo Cisneros MD;  Location: [facility];  Service:    • APPENDECTOMY     •  SECTION     • CHOLECYSTECTOMY     • FOOT SURGERY     • INJECTION OF MEDICATION  2016    KENALOG(4)   • INJECTION OF MEDICATION  2011    PHENERGAN(1)   • INJECTION OF MEDICATION  2015    ROCEPHIN(1)   • INJECTION OF MEDICATION  2015    TORADOL(2)   • LEG SURGERY           Family History   Problem Relation Age of Onset   • Alzheimer's disease Other    • ADD / ADHD Other    • Depression Other    • Diabetes Other    • Migraines Other        Allergies   Allergen Reactions   • Compazine [Prochlorperazine Edisylate] Anxiety     \"coming out of my skin\"       Social History     Socioeconomic History   • Marital status:      Spouse name: Not on file   • Number of children: Not on file   • Years of education: Not on file   • Highest education level: Not on file   Tobacco Use   • Smoking status: Current Every Day Smoker     Packs/day: 1.00     Types: Cigarettes   • Smokeless tobacco: Never Used   Vaping Use   • Vaping Use: Never used   Substance and Sexual Activity   • Alcohol use: Yes     Comment: occ   • Drug use: No   • Sexual activity: Defer         Current Outpatient Medications   Medication Sig Dispense Refill   • amantadine (SYMMETREL) 100 MG capsule      • ARIPiprazole (ABILIFY) 2 MG tablet Take 2 mg by mouth Every Morning.     • benzonatate (TESSALON) 200 MG capsule Take 1 capsule by mouth 3 (Three) Times a Day As Needed for Cough. 30 capsule 0   • benztropine (COGENTIN) 2 MG tablet Take 1 tablet by mouth 2 (Two) Times a Day. 60 tablet 2   • buprenorphine-naloxone (SUBOXONE) 8-2 MG per SL tablet Place 1 tablet under the tongue Daily.     • clonazePAM (KlonoPIN) 2 MG tablet Take 2 mg by mouth 3 (Three) Times a Day As Needed.     • cyanocobalamin (VITAMIN B-12) 1000 MCG tablet Take 1 tablet by mouth Daily. 30 tablet 0   • cyclobenzaprine (FLEXERIL) " 10 MG tablet Take 1 tablet by mouth 3 (Three) Times a Day. 90 tablet 1   • DULoxetine (CYMBALTA) 60 MG capsule Take 1 capsule by mouth Every Morning. 30 capsule 2   • exenatide er (BYDUREON) 2 MG pen-injector injection Inject 1 pen under the skin into the appropriate area as directed 1 (One) Time Per Week. 4 pen 11   • gabapentin (NEURONTIN) 100 MG capsule Take 100 mg by mouth 3 (Three) Times a Day.     • HYDROcodone-acetaminophen (NORCO) 7.5-325 MG per tablet TAKE 1 TAB BY MOUTH 3 TIMES A DAY AS NEEDED FOR PAIN     • Insulin Pen Needle (Pen Needles) 31G X 8 MM misc For use with Victoza pen 90 each 3   • Liraglutide (VICTOZA) 18 MG/3ML solution pen-injector injection Inject 0.6 mg under the skin into the appropriate area as directed Daily. 3 mL 5   • Liraglutide (Victoza) 18 MG/3ML solution pen-injector injection Inject 1.2 mg under the skin into the appropriate area as directed Daily. 3 pen 11   • promethazine (PHENERGAN) 25 MG tablet Take 1 tablet by mouth Every 6 (Six) Hours As Needed for Nausea or Vomiting. 20 tablet 0   • promethazine-dextromethorphan (PROMETHAZINE-DM) 6.25-15 MG/5ML syrup Take 5 mL by mouth At Night As Needed for Cough. 120 mL 0   • SUMAtriptan (IMITREX) 50 MG tablet Take one tablet at onset of headache. May repeat dose one time in 2 hours if headache not relieved. 9 tablet 11   • Topiramate  MG capsule extended-release 24 hour sprinkle Take 1 capsule by mouth Every Morning.     • vitamin D (ERGOCALCIFEROL) 1.25 MG (65606 UT) capsule capsule TAKE 1 CAPSULE BY MOUTH EVERY 7 (SEVEN) DAYS. 12 capsule 1   • Vraylar 6 MG capsule      • levonorgestrel (MIRENA, 52 MG,) 20 MCG/24HR IUD 1 each by Intrauterine route 1 (One) Time for 1 dose. 1 each 0     Current Facility-Administered Medications   Medication Dose Route Frequency Provider Last Rate Last Admin   • Semaglutide(0.25 or 0.5MG/DOS) (OZEMPIC) solution pen-injector 0.25 mg  0.25 mg Subcutaneous Weekly Aileen Braden, APRN      "      Review of Systems   Constitutional: Positive for activity change.   HENT: Negative.    Eyes: Negative.    Respiratory: Negative.    Cardiovascular: Positive for leg swelling.   Gastrointestinal: Negative.    Endocrine: Negative.    Genitourinary: Negative.    Musculoskeletal: Positive for arthralgias, back pain and gait problem.        Foot and ankle pain    Skin: Positive for color change. Negative for wound.   Allergic/Immunologic: Negative.    Hematological: Negative.    Psychiatric/Behavioral: Positive for dysphoric mood.        Depression          OBJECTIVE    /77   Pulse 112   Ht 162.6 cm (64\")   Wt 95.3 kg (210 lb)   SpO2 99%   BMI 36.05 kg/m²     Physical Exam  Vitals reviewed.   Constitutional:       General: She is not in acute distress.     Appearance: She is well-developed.   HENT:      Head: Normocephalic and atraumatic.      Nose: Nose normal.   Eyes:      Conjunctiva/sclera: Conjunctivae normal.      Pupils: Pupils are equal, round, and reactive to light.   Pulmonary:      Effort: Pulmonary effort is normal. No respiratory distress.      Breath sounds: No wheezing.   Musculoskeletal:         General: Swelling, tenderness and signs of injury present. No deformity.   Skin:     General: Skin is warm and dry.      Capillary Refill: Capillary refill takes less than 2 seconds.      Findings: Bruising present.   Neurological:      Mental Status: She is alert and oriented to person, place, and time.   Psychiatric:         Behavior: Behavior normal.         Thought Content: Thought content normal.          Lower Extremity Exam:     Cardiovascular:    Palpable pedal pulses bilateral  Edema and ecchymosis noted right lateral foot and ankle and dorsal left foot  Musculoskeletal:  Muscle strength is deferred bilateral  Global pain on palpation right lateral foot and ankle  Pain on palpation left hallux  No gross deformities  Dermatological:   No open wounds  Neurological:   Sensation intact to " light touch      Foot/Ankle Exam        Procedures        ASSESSMENT AND PLAN    Diagnoses and all orders for this visit:    1. Injury of right ankle, initial encounter (Primary)    2. Closed nondisplaced fracture of proximal phalanx of left great toe, initial encounter        - Comprehensive foot and ankle exam performed.   -Radiographs reviewed.  Concern for avulsion fracture to right cuboid.  Right ankle mortise is well aligned.  Nondisplaced fracture to left first proximal phalanx.  -Continue weightbearing in cam boot on the right lower extremity.  I dispensed a better fitting surgical shoe for the left.  Ice and anti-inflammatories as needed.  - All questions were answered to the patients satisfaction.  - RTC 3 weeks, repeat radiographs(right and left foot)            This document has been electronically signed by Tyler Wild DPM on September 22, 2021 13:02 CDT     9/22/2021  13:02 CDT

## 2021-10-06 ENCOUNTER — OFFICE VISIT (OUTPATIENT)
Dept: PODIATRY | Facility: CLINIC | Age: 41
End: 2021-10-06

## 2021-10-06 VITALS
BODY MASS INDEX: 35.85 KG/M2 | SYSTOLIC BLOOD PRESSURE: 138 MMHG | HEART RATE: 110 BPM | HEIGHT: 64 IN | WEIGHT: 210 LBS | OXYGEN SATURATION: 96 % | DIASTOLIC BLOOD PRESSURE: 84 MMHG

## 2021-10-06 DIAGNOSIS — S92.415A CLOSED NONDISPLACED FRACTURE OF PROXIMAL PHALANX OF LEFT GREAT TOE, INITIAL ENCOUNTER: ICD-10-CM

## 2021-10-06 DIAGNOSIS — S99.911A INJURY OF RIGHT ANKLE, INITIAL ENCOUNTER: Primary | ICD-10-CM

## 2021-10-06 PROCEDURE — 99024 POSTOP FOLLOW-UP VISIT: CPT | Performed by: PODIATRIST

## 2021-10-06 NOTE — PROGRESS NOTES
Chris Valdez  1980  41 y.o. female     Patient returns to clinic for a recheck of bilateral foot pain. Xray obtained today     10/06/2021     Chief Complaint   Patient presents with   • Left Foot - Follow-up, Post-op   • Right Foot - Follow-up, Post-op       History of Present Illness    Chris Valdez is a 41 y.o.female who presents to clinic today for follow-up. Rates her pain as a 5 out of 10.    Past Medical History:   Diagnosis Date   • Abdominal pain    • Acute bronchitis    • Acute upper respiratory infection    • Anxiety state    • Backache    • Bell's palsy    • Cellulitis      left breast      • Chest wall pain     right lateral chest wall-poss early shingles      • Chest wall pain 10/21/2015    right lateral chest wall-poss early shingles      • Chronic low back pain     May 13 2014 MRI ddd with mild left neuroforminal narrowing l4-l5      • Chronic retention of urine     ACUTE   • Cough    • Depressive disorder    • Dysuria    • Facial hemiparesis (HCC)    • Facial hemiparesis (HCC)    • Fatigue    • Fatigue    • Fever    • Ganglion cyst     LEFT BREAST   • Generalized anxiety disorder    • Headache    • Insomnia    • Migraine    • Pain and other symptoms associated with female genital organs    • Pain radiating to back     LUMBAR   • Postnasal drip    • Retention of urine    • Schizophrenia (HCC)    • Skin sensation disturbance    • Urinary incontinence    • Urinary tract infectious disease    • Viral gastroenteritis    • Vulvovaginitis          Past Surgical History:   Procedure Laterality Date   • ANKLE OPEN REDUCTION INTERNAL FIXATION Left 2017    Procedure: ANKLE OPEN REDUCTION INTERNAL FIXATION;  Surgeon: Leo Cisneros MD;  Location: [facility];  Service:    • APPENDECTOMY     •  SECTION     • CHOLECYSTECTOMY     • FOOT SURGERY     • INJECTION OF MEDICATION  2016    KENALOG(4)   • INJECTION OF MEDICATION  2011    PHENERGAN(1)   • INJECTION OF  "MEDICATION  09/14/2015    ROCEPHIN(1)   • INJECTION OF MEDICATION  06/05/2015    TORADOL(2)   • LEG SURGERY           Family History   Problem Relation Age of Onset   • Alzheimer's disease Other    • ADD / ADHD Other    • Depression Other    • Diabetes Other    • Migraines Other        Allergies   Allergen Reactions   • Compazine [Prochlorperazine Edisylate] Anxiety     \"coming out of my skin\"       Social History     Socioeconomic History   • Marital status:    Tobacco Use   • Smoking status: Current Every Day Smoker     Packs/day: 1.00     Types: Cigarettes   • Smokeless tobacco: Never Used   Vaping Use   • Vaping Use: Never used   Substance and Sexual Activity   • Alcohol use: Yes     Comment: occ   • Drug use: No   • Sexual activity: Defer         Current Outpatient Medications   Medication Sig Dispense Refill   • amantadine (SYMMETREL) 100 MG capsule      • ARIPiprazole (ABILIFY) 2 MG tablet Take 2 mg by mouth Every Morning.     • benzonatate (TESSALON) 200 MG capsule Take 1 capsule by mouth 3 (Three) Times a Day As Needed for Cough. 30 capsule 0   • benztropine (COGENTIN) 2 MG tablet Take 1 tablet by mouth 2 (Two) Times a Day. 60 tablet 2   • buprenorphine-naloxone (SUBOXONE) 8-2 MG per SL tablet Place 1 tablet under the tongue Daily.     • clonazePAM (KlonoPIN) 2 MG tablet Take 2 mg by mouth 3 (Three) Times a Day As Needed.     • cyanocobalamin (VITAMIN B-12) 1000 MCG tablet Take 1 tablet by mouth Daily. 30 tablet 0   • cyclobenzaprine (FLEXERIL) 10 MG tablet Take 1 tablet by mouth 3 (Three) Times a Day. 90 tablet 1   • DULoxetine (CYMBALTA) 60 MG capsule Take 1 capsule by mouth Every Morning. 30 capsule 2   • exenatide er (BYDUREON) 2 MG pen-injector injection Inject 1 pen under the skin into the appropriate area as directed 1 (One) Time Per Week. 4 pen 11   • gabapentin (NEURONTIN) 100 MG capsule Take 100 mg by mouth 3 (Three) Times a Day.     • HYDROcodone-acetaminophen (NORCO) 7.5-325 MG per tablet " TAKE 1 TAB BY MOUTH 3 TIMES A DAY AS NEEDED FOR PAIN     • Insulin Pen Needle (Pen Needles) 31G X 8 MM misc For use with Victoza pen 90 each 3   • Liraglutide (VICTOZA) 18 MG/3ML solution pen-injector injection Inject 0.6 mg under the skin into the appropriate area as directed Daily. 3 mL 5   • Liraglutide (Victoza) 18 MG/3ML solution pen-injector injection Inject 1.2 mg under the skin into the appropriate area as directed Daily. 3 pen 11   • promethazine (PHENERGAN) 25 MG tablet Take 1 tablet by mouth Every 6 (Six) Hours As Needed for Nausea or Vomiting. 20 tablet 0   • promethazine-dextromethorphan (PROMETHAZINE-DM) 6.25-15 MG/5ML syrup Take 5 mL by mouth At Night As Needed for Cough. 120 mL 0   • SUMAtriptan (IMITREX) 50 MG tablet Take one tablet at onset of headache. May repeat dose one time in 2 hours if headache not relieved. 9 tablet 11   • Topiramate  MG capsule extended-release 24 hour sprinkle Take 1 capsule by mouth Every Morning.     • vitamin D (ERGOCALCIFEROL) 1.25 MG (53123 UT) capsule capsule TAKE 1 CAPSULE BY MOUTH EVERY 7 (SEVEN) DAYS. 12 capsule 1   • Vraylar 6 MG capsule      • levonorgestrel (MIRENA, 52 MG,) 20 MCG/24HR IUD 1 each by Intrauterine route 1 (One) Time for 1 dose. 1 each 0     Current Facility-Administered Medications   Medication Dose Route Frequency Provider Last Rate Last Admin   • Semaglutide(0.25 or 0.5MG/DOS) (OZEMPIC) solution pen-injector 0.25 mg  0.25 mg Subcutaneous Weekly Aileen Braden, APRN           Review of Systems   Constitutional: Positive for activity change.   HENT: Negative.    Eyes: Negative.    Respiratory: Negative.    Cardiovascular: Positive for leg swelling.   Gastrointestinal: Negative.    Endocrine: Negative.    Genitourinary: Negative.    Musculoskeletal: Positive for arthralgias, back pain and gait problem.        Foot and ankle pain    Skin: Positive for color change. Negative for wound.   Allergic/Immunologic: Negative.    Hematological:  "Negative.    Psychiatric/Behavioral: Positive for dysphoric mood.        Depression          OBJECTIVE    /84   Pulse 110   Ht 162.6 cm (64\")   Wt 95.3 kg (210 lb)   SpO2 96%   BMI 36.05 kg/m²     Physical Exam  Vitals reviewed.   Constitutional:       General: She is not in acute distress.     Appearance: She is well-developed.   HENT:      Head: Normocephalic and atraumatic.      Nose: Nose normal.   Eyes:      Conjunctiva/sclera: Conjunctivae normal.      Pupils: Pupils are equal, round, and reactive to light.   Pulmonary:      Effort: Pulmonary effort is normal. No respiratory distress.      Breath sounds: No wheezing.   Musculoskeletal:         General: Swelling, tenderness and signs of injury present. No deformity.   Skin:     General: Skin is warm and dry.      Capillary Refill: Capillary refill takes less than 2 seconds.      Findings: Bruising present.   Neurological:      Mental Status: She is alert and oriented to person, place, and time.   Psychiatric:         Behavior: Behavior normal.         Thought Content: Thought content normal.          Lower Extremity Exam:     Cardiovascular:    Palpable pedal pulses bilateral  Edema and ecchymosis noted right lateral foot and ankle and dorsal left foot  Musculoskeletal:  Muscle strength is deferred bilateral  Mild pain on palpation to right lateral foot and ankle  Significant pain on palpation to left hallux  No gross deformities  Dermatological:   No open wounds  Neurological:   Sensation intact to light touch      Foot/Ankle Exam        Procedures        ASSESSMENT AND PLAN    Diagnoses and all orders for this visit:    1. Injury of right ankle, initial encounter (Primary)  -     XR Foot 3+ View Left  -     XR Foot 3+ View Right    2. Closed nondisplaced fracture of proximal phalanx of left great toe, initial encounter  -     XR Foot 3+ View Right        -Repeat radiographs taken and reviewed. Healing left great toe fracture.  -Continue surgical " shoe  -All questions were answered to the patients satisfaction.  -RTC 3 weeks            This document has been electronically signed by Tyler Wild DPM on October 12, 2021 20:24 CDT     10/12/2021  20:24 CDT

## 2022-02-18 ENCOUNTER — HOSPITAL ENCOUNTER (OUTPATIENT)
Dept: GENERAL RADIOLOGY | Facility: HOSPITAL | Age: 42
Discharge: HOME | End: 2022-02-18

## 2022-02-18 ENCOUNTER — TRANSCRIBE ORDERS (OUTPATIENT)
Dept: LAB | Facility: HOSPITAL | Age: 42
End: 2022-02-18

## 2022-02-18 ENCOUNTER — LAB (OUTPATIENT)
Dept: LAB | Facility: HOSPITAL | Age: 42
End: 2022-02-18

## 2022-02-18 DIAGNOSIS — Z79.891 ENCOUNTER FOR LONG-TERM METHADONE USE: ICD-10-CM

## 2022-02-18 DIAGNOSIS — M54.50 LOW BACK PAIN, UNSPECIFIED BACK PAIN LATERALITY, UNSPECIFIED CHRONICITY, UNSPECIFIED WHETHER SCIATICA PRESENT: ICD-10-CM

## 2022-02-18 DIAGNOSIS — Z79.891 ENCOUNTER FOR LONG-TERM METHADONE USE: Primary | ICD-10-CM

## 2022-02-18 DIAGNOSIS — M25.572 PAIN IN JOINT, ANKLE AND FOOT, LEFT: ICD-10-CM

## 2022-02-18 DIAGNOSIS — M54.2 CERVICALGIA: ICD-10-CM

## 2022-02-18 LAB
25(OH)D3 SERPL-MCNC: 21.5 NG/ML
BASOPHILS # BLD AUTO: 0.05 10*3/MM3 (ref 0–0.2)
BASOPHILS NFR BLD AUTO: 0.6 % (ref 0–1.5)
DEPRECATED RDW RBC AUTO: 40.2 FL (ref 37–54)
EOSINOPHIL # BLD AUTO: 0.21 10*3/MM3 (ref 0–0.4)
EOSINOPHIL NFR BLD AUTO: 2.7 % (ref 0.3–6.2)
ERYTHROCYTE [DISTWIDTH] IN BLOOD BY AUTOMATED COUNT: 13 % (ref 12.3–15.4)
ERYTHROCYTE [SEDIMENTATION RATE] IN BLOOD: 44 MM/HR (ref 0–20)
HCT VFR BLD AUTO: 46.7 % (ref 34–46.6)
HGB BLD-MCNC: 16.1 G/DL (ref 12–15.9)
IMM GRANULOCYTES # BLD AUTO: 0.04 10*3/MM3 (ref 0–0.05)
IMM GRANULOCYTES NFR BLD AUTO: 0.5 % (ref 0–0.5)
LYMPHOCYTES # BLD AUTO: 2.88 10*3/MM3 (ref 0.7–3.1)
LYMPHOCYTES NFR BLD AUTO: 36.4 % (ref 19.6–45.3)
MCH RBC QN AUTO: 30 PG (ref 26.6–33)
MCHC RBC AUTO-ENTMCNC: 34.5 G/DL (ref 31.5–35.7)
MCV RBC AUTO: 87 FL (ref 79–97)
MONOCYTES # BLD AUTO: 0.33 10*3/MM3 (ref 0.1–0.9)
MONOCYTES NFR BLD AUTO: 4.2 % (ref 5–12)
NEUTROPHILS NFR BLD AUTO: 4.41 10*3/MM3 (ref 1.7–7)
NEUTROPHILS NFR BLD AUTO: 55.6 % (ref 42.7–76)
NRBC BLD AUTO-RTO: 0 /100 WBC (ref 0–0.2)
PLATELET # BLD AUTO: 352 10*3/MM3 (ref 140–450)
PMV BLD AUTO: 11 FL (ref 6–12)
RBC # BLD AUTO: 5.37 10*6/MM3 (ref 3.77–5.28)
TSH SERPL DL<=0.05 MIU/L-ACNC: 1.3 UIU/ML (ref 0.27–4.2)
WBC NRBC COR # BLD: 7.92 10*3/MM3 (ref 3.4–10.8)

## 2022-02-18 PROCEDURE — 72100 X-RAY EXAM L-S SPINE 2/3 VWS: CPT

## 2022-02-18 PROCEDURE — 84443 ASSAY THYROID STIM HORMONE: CPT

## 2022-02-18 PROCEDURE — 85025 COMPLETE CBC W/AUTO DIFF WBC: CPT

## 2022-02-18 PROCEDURE — 72050 X-RAY EXAM NECK SPINE 4/5VWS: CPT

## 2022-02-18 PROCEDURE — 80053 COMPREHEN METABOLIC PANEL: CPT

## 2022-02-18 PROCEDURE — 85652 RBC SED RATE AUTOMATED: CPT

## 2022-02-18 PROCEDURE — 73610 X-RAY EXAM OF ANKLE: CPT

## 2022-02-18 PROCEDURE — 36415 COLL VENOUS BLD VENIPUNCTURE: CPT

## 2022-02-18 PROCEDURE — 73630 X-RAY EXAM OF FOOT: CPT

## 2022-02-18 PROCEDURE — 83735 ASSAY OF MAGNESIUM: CPT

## 2022-02-18 PROCEDURE — 82306 VITAMIN D 25 HYDROXY: CPT

## 2022-02-19 LAB
ALBUMIN SERPL-MCNC: 4.6 G/DL (ref 3.5–5.2)
ALBUMIN/GLOB SERPL: 1.4 G/DL
ALP SERPL-CCNC: 73 U/L (ref 39–117)
ALT SERPL W P-5'-P-CCNC: 30 U/L (ref 1–33)
ANION GAP SERPL CALCULATED.3IONS-SCNC: 17 MMOL/L (ref 5–15)
AST SERPL-CCNC: 28 U/L (ref 1–32)
BILIRUB SERPL-MCNC: 0.2 MG/DL (ref 0–1.2)
BUN SERPL-MCNC: 9 MG/DL (ref 6–20)
BUN/CREAT SERPL: 12.3 (ref 7–25)
CALCIUM SPEC-SCNC: 9.4 MG/DL (ref 8.6–10.5)
CHLORIDE SERPL-SCNC: 102 MMOL/L (ref 98–107)
CO2 SERPL-SCNC: 19 MMOL/L (ref 22–29)
CREAT SERPL-MCNC: 0.73 MG/DL (ref 0.57–1)
GFR SERPL CREATININE-BSD FRML MDRD: 89 ML/MIN/1.73
GLOBULIN UR ELPH-MCNC: 3.2 GM/DL
GLUCOSE SERPL-MCNC: 82 MG/DL (ref 65–99)
MAGNESIUM SERPL-MCNC: 1.9 MG/DL (ref 1.6–2.6)
POTASSIUM SERPL-SCNC: 4.8 MMOL/L (ref 3.5–5.2)
PROT SERPL-MCNC: 7.8 G/DL (ref 6–8.5)
SODIUM SERPL-SCNC: 138 MMOL/L (ref 136–145)

## 2022-02-25 ENCOUNTER — TRANSCRIBE ORDERS (OUTPATIENT)
Dept: MRI IMAGING | Facility: HOSPITAL | Age: 42
End: 2022-02-25

## 2022-02-25 DIAGNOSIS — M47.817 LUMBOSACRAL SPONDYLOSIS WITHOUT MYELOPATHY: ICD-10-CM

## 2022-02-25 DIAGNOSIS — M54.50 LOW BACK PAIN, UNSPECIFIED BACK PAIN LATERALITY, UNSPECIFIED CHRONICITY, UNSPECIFIED WHETHER SCIATICA PRESENT: Primary | ICD-10-CM

## 2022-03-11 ENCOUNTER — HOSPITAL ENCOUNTER (OUTPATIENT)
Dept: MRI IMAGING | Facility: HOSPITAL | Age: 42
Discharge: HOME | End: 2022-03-11
Admitting: PHYSICAL MEDICINE & REHABILITATION

## 2022-03-11 DIAGNOSIS — M54.50 LOW BACK PAIN, UNSPECIFIED BACK PAIN LATERALITY, UNSPECIFIED CHRONICITY, UNSPECIFIED WHETHER SCIATICA PRESENT: ICD-10-CM

## 2022-03-11 DIAGNOSIS — M47.817 LUMBOSACRAL SPONDYLOSIS WITHOUT MYELOPATHY: ICD-10-CM

## 2022-03-11 PROCEDURE — 72148 MRI LUMBAR SPINE W/O DYE: CPT

## 2022-03-25 PROCEDURE — 87635 SARS-COV-2 COVID-19 AMP PRB: CPT | Performed by: NURSE PRACTITIONER

## 2022-03-25 RX ORDER — LIRAGLUTIDE 6 MG/ML
INJECTION SUBCUTANEOUS
Qty: 6 PEN | Refills: 1 | Status: SHIPPED | OUTPATIENT
Start: 2022-03-25 | End: 2022-09-09

## 2022-09-09 ENCOUNTER — OFFICE VISIT (OUTPATIENT)
Dept: FAMILY MEDICINE CLINIC | Facility: CLINIC | Age: 42
End: 2022-09-09

## 2022-09-09 ENCOUNTER — LAB (OUTPATIENT)
Dept: LAB | Facility: HOSPITAL | Age: 42
End: 2022-09-09

## 2022-09-09 VITALS
DIASTOLIC BLOOD PRESSURE: 80 MMHG | SYSTOLIC BLOOD PRESSURE: 120 MMHG | OXYGEN SATURATION: 97 % | HEIGHT: 64 IN | WEIGHT: 255 LBS | BODY MASS INDEX: 43.54 KG/M2 | HEART RATE: 128 BPM

## 2022-09-09 DIAGNOSIS — R53.81 MALAISE AND FATIGUE: ICD-10-CM

## 2022-09-09 DIAGNOSIS — Z00.00 GENERAL MEDICAL EXAM: Primary | ICD-10-CM

## 2022-09-09 DIAGNOSIS — Z01.419 GYNECOLOGIC EXAM NORMAL: ICD-10-CM

## 2022-09-09 DIAGNOSIS — R73.09 BLOOD SUGAR INCREASED: ICD-10-CM

## 2022-09-09 DIAGNOSIS — R53.83 MALAISE AND FATIGUE: ICD-10-CM

## 2022-09-09 DIAGNOSIS — Z12.31 VISIT FOR SCREENING MAMMOGRAM: ICD-10-CM

## 2022-09-09 PROCEDURE — 99396 PREV VISIT EST AGE 40-64: CPT | Performed by: NURSE PRACTITIONER

## 2022-09-09 RX ORDER — GABAPENTIN 600 MG/1
600 TABLET ORAL 3 TIMES DAILY
COMMUNITY

## 2022-09-09 RX ORDER — ARIPIPRAZOLE 5 MG/1
5 TABLET ORAL DAILY
COMMUNITY

## 2022-09-09 NOTE — PROGRESS NOTES
Chief Complaint   Patient presents with   • Annual Exam     Pap smear , mammogram     Subjective   Chris Valdez is a 42 y.o. female.           Gynecologic Exam  The patient's pertinent negatives include no genital itching, genital lesions, genital odor, genital rash, missed menses, pelvic pain or vaginal discharge. This is a new problem. The problem has been unchanged. The patient is experiencing no pain. She is not pregnant. Pertinent negatives include no abdominal pain, anorexia, back pain, chills, constipation, dysuria, fever, flank pain, frequency, headaches, joint swelling, nausea, painful intercourse, rash, sore throat or vomiting. She is sexually active. Her menstrual history has been regular. There is no history of an abdominal surgery, a  section, an ectopic pregnancy, endometriosis, a gynecological surgery, herpes simplex, miscarriage, ovarian cysts, perineal abscess, PID, an STD or a terminated pregnancy.   Fatigue  This is a recurrent problem. The current episode started more than 1 month ago. The problem occurs intermittently. The problem has been resolved. Associated symptoms include fatigue and myalgias. Pertinent negatives include no abdominal pain, anorexia, arthralgias, chest pain, chills, congestion, coughing, diaphoresis, fever, headaches, joint swelling, nausea, numbness, rash, sore throat, swollen glands, urinary symptoms, vertigo, visual change, vomiting or weakness. The treatment provided significant relief.   Blood Sugar Problem  Associated symptoms include fatigue and myalgias. Pertinent negatives include no abdominal pain, anorexia, arthralgias, chest pain, chills, congestion, coughing, diaphoresis, fever, headaches, joint swelling, nausea, numbness, rash, sore throat, swollen glands, urinary symptoms, vertigo, visual change, vomiting or weakness.        The following portions of the patient's history were reviewed and updated as appropriate: allergies, current medications,  past social history and problem list.    Review of Systems   Constitutional: Positive for activity change and fatigue. Negative for appetite change, chills, diaphoresis, fever and unexpected weight change.   HENT: Negative.  Negative for congestion, dental problem, drooling, ear discharge, ear pain, facial swelling, hearing loss, mouth sores, nosebleeds, postnasal drip, rhinorrhea, sinus pressure, sinus pain, sneezing, sore throat, tinnitus and trouble swallowing.    Eyes: Negative.  Negative for photophobia, pain, discharge, redness, itching and visual disturbance.   Respiratory: Negative.  Negative for apnea, cough, choking, chest tightness, shortness of breath, wheezing and stridor.    Cardiovascular: Negative.  Negative for chest pain, palpitations and leg swelling.   Gastrointestinal: Negative.  Negative for abdominal distention, abdominal pain, anal bleeding, anorexia, blood in stool, constipation, nausea and vomiting.   Endocrine: Negative.  Negative for cold intolerance, heat intolerance, polydipsia, polyphagia and polyuria.   Genitourinary: Negative.  Negative for dysuria, flank pain, frequency, missed menses, pelvic pain, vaginal bleeding, vaginal discharge and vaginal pain.   Musculoskeletal: Positive for myalgias. Negative for arthralgias, back pain, gait problem and joint swelling.   Skin: Negative.  Negative for rash.   Allergic/Immunologic: Negative.  Negative for environmental allergies, food allergies and immunocompromised state.   Neurological: Negative.  Negative for vertigo, tremors, seizures, syncope, facial asymmetry, speech difficulty, weakness, light-headedness, numbness and headaches.   Hematological: Negative.  Negative for adenopathy. Does not bruise/bleed easily.   Psychiatric/Behavioral: Positive for sleep disturbance. Negative for agitation, behavioral problems, confusion, decreased concentration, dysphoric mood and hallucinations. The patient is nervous/anxious.        Objective   BP  "120/80   Pulse (!) 128   Ht 162.6 cm (64\")   Wt 116 kg (255 lb)   SpO2 97%   BMI 43.77 kg/m²   Physical Exam  Vitals and nursing note reviewed.   Constitutional:       General: She is not in acute distress.     Appearance: Normal appearance. She is normal weight. She is not ill-appearing, toxic-appearing or diaphoretic.   HENT:      Head: Normocephalic and atraumatic.      Right Ear: Tympanic membrane normal. There is no impacted cerumen.      Left Ear: Tympanic membrane normal. There is no impacted cerumen.      Nose: Nose normal. No congestion or rhinorrhea.      Mouth/Throat:      Mouth: Mucous membranes are dry.      Pharynx: No oropharyngeal exudate or posterior oropharyngeal erythema.   Eyes:      General: No scleral icterus.        Right eye: No discharge.         Left eye: No discharge.   Neck:      Vascular: No carotid bruit.   Cardiovascular:      Rate and Rhythm: Normal rate and regular rhythm.      Pulses: Normal pulses.      Heart sounds: Normal heart sounds. No murmur heard.    No friction rub. No gallop.   Pulmonary:      Effort: Pulmonary effort is normal. No respiratory distress.      Breath sounds: Normal breath sounds. No stridor. No wheezing, rhonchi or rales.   Chest:      Chest wall: No tenderness.   Breasts:      Right: Normal. No swelling, bleeding, axillary adenopathy or supraclavicular adenopathy.      Left: Normal. No swelling, bleeding, axillary adenopathy or supraclavicular adenopathy.       Abdominal:      General: Abdomen is flat. There is no distension.      Palpations: There is no mass.      Tenderness: There is no abdominal tenderness. There is no right CVA tenderness, left CVA tenderness, guarding or rebound.      Hernia: No hernia is present. There is no hernia in the left inguinal area or right inguinal area.   Genitourinary:     Pubic Area: No rash or pubic lice.       Labia:         Right: No rash or tenderness.         Left: No rash or tenderness.       Vagina: Normal.    "   Cervix: Normal.      Uterus: Normal.       Adnexa: Right adnexa normal.      Rectum: Normal.      Comments: iud in place   Musculoskeletal:         General: No swelling, tenderness, deformity or signs of injury. Normal range of motion.      Cervical back: Normal range of motion and neck supple. No rigidity. No muscular tenderness.      Right lower leg: No edema.      Left lower leg: No edema.   Lymphadenopathy:      Cervical: No cervical adenopathy.      Upper Body:      Right upper body: No supraclavicular or axillary adenopathy.      Left upper body: No supraclavicular or axillary adenopathy.      Lower Body: No right inguinal adenopathy. No left inguinal adenopathy.   Skin:     General: Skin is warm.      Coloration: Skin is not jaundiced or pale.      Findings: No bruising, erythema, lesion or rash.      Comments:       Neurological:      General: No focal deficit present.      Mental Status: She is alert and oriented to person, place, and time.      Cranial Nerves: No cranial nerve deficit.      Sensory: No sensory deficit.      Motor: No weakness.      Coordination: Coordination normal.      Gait: Gait normal.      Deep Tendon Reflexes: Reflexes normal.   Psychiatric:         Mood and Affect: Mood normal.         Behavior: Behavior normal.              Assessment & Plan     Problems Addressed this Visit        Health Encounters    General medical exam - Primary    Relevant Orders    CBC & Differential    Comprehensive Metabolic Panel    Hemoglobin A1c    Lipid Panel    Vitamin B12    TSH       Symptoms and Signs    Malaise and fatigue    Relevant Orders    CBC & Differential    Comprehensive Metabolic Panel    Hemoglobin A1c    Lipid Panel    Vitamin B12    TSH      Other Visit Diagnoses     Gynecologic exam normal        Relevant Orders    LIQUID-BASED PAP SMEAR, [facility] ([facility])    CBC & Differential    Comprehensive Metabolic Panel    Hemoglobin A1c    Lipid Panel    Vitamin B12    TSH    Visit for  screening mammogram        Relevant Orders    Mammo Screening Digital Tomosynthesis Bilateral With CAD    CBC & Differential    Comprehensive Metabolic Panel    Hemoglobin A1c    Lipid Panel    Vitamin B12    TSH    Blood sugar increased        Relevant Orders    Hemoglobin A1c      Diagnoses       Codes Comments    General medical exam    -  Primary ICD-10-CM: Z00.00  ICD-9-CM: V70.9     Gynecologic exam normal     ICD-10-CM: Z01.419  ICD-9-CM: V72.31     Malaise and fatigue     ICD-10-CM: R53.81, R53.83  ICD-9-CM: 780.79     Visit for screening mammogram     ICD-10-CM: Z12.31  ICD-9-CM: V76.12     Blood sugar increased     ICD-10-CM: R73.09  ICD-9-CM: 790.29          No orders of the defined types were placed in this encounter.    Current Outpatient Medications on File Prior to Visit   Medication Sig Dispense Refill   • ARIPiprazole (Abilify) 5 MG tablet Take 5 mg by mouth Daily.     • benztropine (COGENTIN) 2 MG tablet Take 1 tablet by mouth 2 (Two) Times a Day. 60 tablet 2   • clonazePAM (KlonoPIN) 2 MG tablet Take 2 mg by mouth Daily.     • cyclobenzaprine (FLEXERIL) 10 MG tablet Take 1 tablet by mouth 3 (Three) Times a Day. 90 tablet 1   • DULoxetine (CYMBALTA) 60 MG capsule Take 1 capsule by mouth Every Morning. 30 capsule 2   • gabapentin (NEURONTIN) 600 MG tablet Take 600 mg by mouth 3 (Three) Times a Day.     • HYDROcodone-acetaminophen (NORCO) 7.5-325 MG per tablet TAKE 1 TAB BY MOUTH 3 TIMES A DAY AS NEEDED FOR PAIN     • promethazine (PHENERGAN) 25 MG tablet Take 1 tablet by mouth Every 6 (Six) Hours As Needed for Nausea or Vomiting. 20 tablet 0   • SUMAtriptan (IMITREX) 50 MG tablet Take one tablet at onset of headache. May repeat dose one time in 2 hours if headache not relieved. 9 tablet 11   • Vraylar 6 MG capsule      • cyanocobalamin (VITAMIN B-12) 1000 MCG tablet Take 1 tablet by mouth Daily. 30 tablet 0   • fluconazole (DIFLUCAN) 200 MG tablet Take 1 tablet by mouth Daily. 2 tablet 0   • Insulin  Pen Needle (Pen Needles) 31G X 8 MM misc For use with Victoza pen 90 each 3   • levonorgestrel (MIRENA, 52 MG,) 20 MCG/24HR IUD 1 each by Intrauterine route 1 (One) Time for 1 dose. 1 each 0   • [DISCONTINUED] amantadine (SYMMETREL) 100 MG capsule      • [DISCONTINUED] ARIPiprazole (ABILIFY) 2 MG tablet Take 2 mg by mouth Every Morning.     • [DISCONTINUED] benzonatate (TESSALON) 200 MG capsule Take 1 capsule by mouth 3 (Three) Times a Day As Needed for Cough. 30 capsule 0   • [DISCONTINUED] buprenorphine-naloxone (SUBOXONE) 8-2 MG per SL tablet Place 1 tablet under the tongue Daily.     • [DISCONTINUED] exenatide er (BYDUREON) 2 MG pen-injector injection Inject 1 pen under the skin into the appropriate area as directed 1 (One) Time Per Week. 4 pen 11   • [DISCONTINUED] gabapentin (NEURONTIN) 100 MG capsule Take 100 mg by mouth 3 (Three) Times a Day.     • [DISCONTINUED] Liraglutide (Victoza) 18 MG/3ML solution pen-injector injection Inject 1.2 mg under the skin into the appropriate area as directed Daily. 3 pen 11   • [DISCONTINUED] mupirocin (BACTROBAN) 2 % ointment Apply 1 application topically to the appropriate area as directed 2 (Two) Times a Day. 1 g 0   • [DISCONTINUED] nystatin (MYCOSTATIN) 100,000 unit/mL suspension Take 5 mL by mouth 4 (Four) Times a Day. 473 mL 0   • [DISCONTINUED] Topiramate  MG capsule extended-release 24 hour sprinkle Take 1 capsule by mouth Every Morning.     • [DISCONTINUED] Victoza 18 MG/3ML solution pen-injector injection INJECT 0.6 MG UNDER THE SKIN INTO THE APPROPRIATE AREA AS DIRECTED DAILY. 6 pen 1   • [DISCONTINUED] vitamin D (ERGOCALCIFEROL) 1.25 MG (02769 UT) capsule capsule TAKE 1 CAPSULE BY MOUTH EVERY 7 (SEVEN) DAYS. 12 capsule 1     Current Facility-Administered Medications on File Prior to Visit   Medication Dose Route Frequency Provider Last Rate Last Admin   • Semaglutide(0.25 or 0.5MG/DOS) (OZEMPIC) solution pen-injector 0.25 mg  0.25 mg Subcutaneous Weekly  Dayday Avery, Aileen VITALE, APRN           15 minutes   Follow Up   Return in about 6 months (around 3/9/2023) for Next scheduled follow up.     Pap today, meds as directed    Mammogram  Diet discussed  Labs as directed     Follow up in 6 months as directe

## 2022-09-14 LAB — REF LAB TEST METHOD: NORMAL

## 2022-09-14 RX ORDER — FLUCONAZOLE 150 MG/1
TABLET ORAL
Qty: 3 TABLET | Refills: 0 | Status: SHIPPED | OUTPATIENT
Start: 2022-09-14

## 2022-10-04 ENCOUNTER — TELEPHONE (OUTPATIENT)
Dept: FAMILY MEDICINE CLINIC | Facility: CLINIC | Age: 42
End: 2022-10-04

## 2022-10-04 NOTE — TELEPHONE ENCOUNTER
Per ADEOLA Gallagher, Ms. Valdez  has been called with recent Screening Mammogram results & recommendations.  Continue current medications and follow-up as planned or sooner if any problems.       ----- Message from ADEOLA Berrios sent at 10/4/2022 10:04 AM CDT -----  Regarding: FW:  Can you let her normal  ----- Message -----  From: [facility], Rad Results [place] [facility]  Sent: 10/4/2022   9:55 AM CDT  To: ADEOLA Berrios

## 2023-02-16 ENCOUNTER — TRANSCRIBE ORDERS (OUTPATIENT)
Dept: GENERAL RADIOLOGY | Facility: CLINIC | Age: 43
End: 2023-02-16

## 2023-02-16 DIAGNOSIS — M25.512 LEFT SHOULDER PAIN, UNSPECIFIED CHRONICITY: Primary | ICD-10-CM

## 2023-04-17 DIAGNOSIS — Z12.31 ENCOUNTER FOR SCREENING MAMMOGRAM FOR MALIGNANT NEOPLASM OF BREAST: Primary | ICD-10-CM

## 2023-05-01 ENCOUNTER — OFFICE VISIT (OUTPATIENT)
Dept: FAMILY MEDICINE CLINIC | Facility: CLINIC | Age: 43
End: 2023-05-01
Payer: MEDICARE

## 2023-05-01 VITALS
DIASTOLIC BLOOD PRESSURE: 88 MMHG | WEIGHT: 289.7 LBS | BODY MASS INDEX: 49.46 KG/M2 | OXYGEN SATURATION: 95 % | RESPIRATION RATE: 18 BRPM | SYSTOLIC BLOOD PRESSURE: 124 MMHG | HEART RATE: 123 BPM | HEIGHT: 64 IN

## 2023-05-01 DIAGNOSIS — E66.01 CLASS 3 SEVERE OBESITY WITH SERIOUS COMORBIDITY AND BODY MASS INDEX (BMI) OF 45.0 TO 49.9 IN ADULT, UNSPECIFIED OBESITY TYPE: ICD-10-CM

## 2023-05-01 DIAGNOSIS — R73.9 HYPERGLYCEMIA: Primary | ICD-10-CM

## 2023-05-01 DIAGNOSIS — R60.0 EXTREMITY EDEMA: ICD-10-CM

## 2023-05-01 DIAGNOSIS — E78.2 HYPERLIPEMIA, MIXED: ICD-10-CM

## 2023-05-01 PROCEDURE — 99214 OFFICE O/P EST MOD 30 MIN: CPT | Performed by: NURSE PRACTITIONER

## 2023-05-01 PROCEDURE — 1159F MED LIST DOCD IN RCRD: CPT | Performed by: NURSE PRACTITIONER

## 2023-05-01 PROCEDURE — 1160F RVW MEDS BY RX/DR IN RCRD: CPT | Performed by: NURSE PRACTITIONER

## 2023-05-01 RX ORDER — POTASSIUM CHLORIDE 750 MG/1
TABLET, FILM COATED, EXTENDED RELEASE ORAL
Qty: 60 TABLET | Refills: 11 | Status: SHIPPED | OUTPATIENT
Start: 2023-05-01

## 2023-05-01 RX ORDER — LIDOCAINE 50 MG/G
OINTMENT TOPICAL
COMMUNITY
Start: 2023-01-26

## 2023-05-01 RX ORDER — DICLOFENAC 16.05 MG/ML
SOLUTION TOPICAL
COMMUNITY
Start: 2023-01-26

## 2023-05-01 RX ORDER — QUETIAPINE FUMARATE 100 MG/1
100 TABLET, FILM COATED ORAL
COMMUNITY
Start: 2023-02-03

## 2023-05-01 RX ORDER — FUROSEMIDE 20 MG/1
TABLET ORAL
Qty: 60 TABLET | Refills: 11 | Status: SHIPPED | OUTPATIENT
Start: 2023-05-01

## 2023-05-01 RX ORDER — MIRTAZAPINE 15 MG/1
15 TABLET, FILM COATED ORAL
COMMUNITY
Start: 2023-02-13

## 2023-05-01 RX ORDER — NALOXONE HYDROCHLORIDE 4 MG/.1ML
SPRAY NASAL
COMMUNITY
Start: 2023-02-16

## 2023-05-01 RX ORDER — ASENAPINE MALEATE 2.5 MG/1
TABLET SUBLINGUAL
COMMUNITY
Start: 2023-03-06

## 2023-05-01 NOTE — PROGRESS NOTES
Chief Complaint   Patient presents with   • Edema   • Weight Gain     Subjective   Chris Valdez is a 42 y.o. female.           Obesity  This is a chronic problem. The current episode started more than 1 year ago. The problem occurs daily. The problem has been gradually worsening. Associated symptoms include fatigue and myalgias. Pertinent negatives include no abdominal pain, arthralgias, chest pain, chills, congestion, coughing, diaphoresis, fever, headaches, joint swelling, nausea, neck pain, numbness, rash, sore throat, vomiting or weakness. The treatment provided no relief.   Leg Swelling  This is a recurrent problem. The current episode started more than 1 month ago. The problem occurs intermittently. The problem has been gradually worsening. Associated symptoms include fatigue and myalgias. Pertinent negatives include no abdominal pain, arthralgias, chest pain, chills, congestion, coughing, diaphoresis, fever, headaches, joint swelling, nausea, neck pain, numbness, rash, sore throat, vomiting or weakness. The treatment provided mild relief.   Hyperlipidemia  This is a chronic problem. The problem is controlled. Exacerbating diseases include diabetes and obesity. Associated symptoms include myalgias. Pertinent negatives include no chest pain or shortness of breath. Current antihyperlipidemic treatment includes statins. Compliance problems include adherence to diet.  Risk factors for coronary artery disease include dyslipidemia.   Blood Sugar Problem  This is a chronic problem. The problem occurs daily. The problem has been gradually worsening. Associated symptoms include fatigue and myalgias. Pertinent negatives include no abdominal pain, arthralgias, chest pain, chills, congestion, coughing, diaphoresis, fever, headaches, joint swelling, nausea, neck pain, numbness, rash, sore throat, vomiting or weakness. Treatments tried: metformin in the past  The treatment provided mild relief.        The following  portions of the patient's history were reviewed and updated as appropriate: allergies, current medications, past social history and problem list.    Review of Systems   Constitutional: Positive for activity change, fatigue and unexpected weight change. Negative for appetite change, chills, diaphoresis and fever.   HENT: Negative.  Negative for congestion, dental problem, drooling, ear discharge, ear pain, facial swelling, hearing loss, mouth sores, nosebleeds, postnasal drip, rhinorrhea, sinus pressure, sinus pain, sneezing, sore throat, tinnitus, trouble swallowing and voice change.    Eyes: Negative.  Negative for photophobia, pain, discharge, redness, itching and visual disturbance.   Respiratory: Negative.  Negative for apnea, cough, choking, chest tightness, shortness of breath, wheezing and stridor.    Cardiovascular: Positive for leg swelling. Negative for chest pain and palpitations.   Gastrointestinal: Negative.  Negative for abdominal distention, abdominal pain, anal bleeding, blood in stool, constipation, nausea and vomiting.   Endocrine: Negative.  Negative for cold intolerance, heat intolerance, polydipsia, polyphagia and polyuria.   Genitourinary: Negative.  Negative for dysuria, flank pain, frequency, pelvic pain, vaginal bleeding, vaginal discharge and vaginal pain.   Musculoskeletal: Positive for myalgias. Negative for arthralgias, back pain, gait problem, joint swelling, neck pain and neck stiffness.   Skin: Negative.  Negative for color change, pallor and rash.   Allergic/Immunologic: Negative.  Negative for environmental allergies, food allergies and immunocompromised state.   Neurological: Negative.  Negative for tremors, seizures, syncope, facial asymmetry, speech difficulty, weakness, light-headedness, numbness and headaches.   Hematological: Negative.  Negative for adenopathy. Does not bruise/bleed easily.   Psychiatric/Behavioral: Positive for sleep disturbance. Negative for agitation,  "behavioral problems, confusion, decreased concentration, dysphoric mood and hallucinations. The patient is nervous/anxious.        Objective   /88 (BP Location: Left arm, Patient Position: Sitting, Cuff Size: Large Adult)   Pulse (!) 123   Resp 18   Ht 162.6 cm (64\")   Wt 131 kg (289 lb 11.2 oz) Comment: correct weight  SpO2 95%   BMI 49.73 kg/m²   Physical Exam  Vitals and nursing note reviewed.   Constitutional:       General: She is not in acute distress.     Appearance: Normal appearance. She is normal weight. She is not ill-appearing, toxic-appearing or diaphoretic.   HENT:      Head: Normocephalic and atraumatic.      Right Ear: Tympanic membrane normal. There is no impacted cerumen.      Left Ear: Tympanic membrane normal. There is no impacted cerumen.      Nose: Nose normal. No congestion or rhinorrhea.      Mouth/Throat:      Mouth: Mucous membranes are dry.      Pharynx: No oropharyngeal exudate or posterior oropharyngeal erythema.   Eyes:      General: No scleral icterus.        Right eye: No discharge.         Left eye: No discharge.   Neck:      Vascular: No carotid bruit.   Cardiovascular:      Rate and Rhythm: Normal rate and regular rhythm.      Pulses: Normal pulses.      Heart sounds: Normal heart sounds. No murmur heard.    No friction rub. No gallop.   Pulmonary:      Effort: Pulmonary effort is normal. No respiratory distress.      Breath sounds: Normal breath sounds. No stridor. No wheezing, rhonchi or rales.   Chest:      Chest wall: No tenderness.   Breasts:     Right: Normal. No swelling or bleeding.      Left: Normal. No swelling or bleeding.   Abdominal:      General: Abdomen is flat. There is no distension.      Palpations: There is no mass.      Tenderness: There is no abdominal tenderness. There is no right CVA tenderness, left CVA tenderness, guarding or rebound.      Hernia: No hernia is present. There is no hernia in the left inguinal area or right inguinal area. "   Genitourinary:     Pubic Area: No rash or pubic lice.       Labia:         Right: No rash or tenderness.         Left: No rash or tenderness.       Vagina: Normal.      Cervix: Normal.      Uterus: Normal.       Adnexa: Right adnexa normal.      Rectum: Normal.   Musculoskeletal:         General: Swelling present. No tenderness, deformity or signs of injury. Normal range of motion.      Cervical back: Normal range of motion and neck supple. No rigidity or tenderness. No muscular tenderness.      Right lower leg: No edema.      Left lower leg: No edema.      Comments: Leg edema upper and lower extremity    Lymphadenopathy:      Cervical: No cervical adenopathy.      Upper Body:      Right upper body: No supraclavicular or axillary adenopathy.      Left upper body: No supraclavicular or axillary adenopathy.      Lower Body: No right inguinal adenopathy. No left inguinal adenopathy.   Skin:     General: Skin is warm.      Coloration: Skin is not jaundiced or pale.      Findings: No bruising, erythema, lesion or rash.      Comments:       Neurological:      General: No focal deficit present.      Mental Status: She is alert and oriented to person, place, and time.      Cranial Nerves: No cranial nerve deficit.      Sensory: No sensory deficit.      Motor: No weakness.      Coordination: Coordination normal.      Gait: Gait normal.      Deep Tendon Reflexes: Reflexes normal.   Psychiatric:         Mood and Affect: Mood normal.         Behavior: Behavior normal.              Assessment & Plan     Problems Addressed this Visit    None  Visit Diagnoses     Hyperglycemia    -  Primary    Relevant Orders    CBC & Differential    Comprehensive Metabolic Panel    Hemoglobin A1c    Vitamin B12    TSH    Cortisol    Class 3 severe obesity with serious comorbidity and body mass index (BMI) of 45.0 to 49.9 in adult, unspecified obesity type        Extremity edema        Relevant Orders    CBC & Differential    Comprehensive  Metabolic Panel    Hemoglobin A1c    Vitamin B12    TSH    Cortisol    Hyperlipemia, mixed        Relevant Orders    Lipid Panel      Diagnoses       Codes Comments    Hyperglycemia    -  Primary ICD-10-CM: R73.9  ICD-9-CM: 790.29     Class 3 severe obesity with serious comorbidity and body mass index (BMI) of 45.0 to 49.9 in adult, unspecified obesity type     ICD-10-CM: E66.01, Z68.42  ICD-9-CM: 278.01, V85.42     Extremity edema     ICD-10-CM: R60.0  ICD-9-CM: 782.3     Hyperlipemia, mixed     ICD-10-CM: E78.2  ICD-9-CM: 272.2            New Medications Ordered This Visit   Medications   • furosemide (Lasix) 20 MG tablet     Si-2 daily daily     Dispense:  60 tablet     Refill:  11   • potassium chloride 10 MEQ CR tablet     Si-2 daily with lasix     Dispense:  60 tablet     Refill:  11   • Semaglutide,0.25 or 0.5MG/DOS, (OZEMPIC) 2 MG/1.5ML solution pen-injector     Sig: Inject 0.25 mg under the skin into the appropriate area as directed 1 (One) Time Per Week.     Dispense:  1.5 mL     Refill:  11   • metFORMIN (Glucophage) 500 MG tablet     Sig: Take 1 tablet by mouth Daily.     Dispense:  30 tablet     Refill:  11     Current Outpatient Medications on File Prior to Visit   Medication Sig Dispense Refill   • clonazePAM (KlonoPIN) 2 MG tablet Take 1 tablet by mouth Daily.     • cyclobenzaprine (FLEXERIL) 10 MG tablet Take 1 tablet by mouth 3 (Three) Times a Day. 90 tablet 1   • DULoxetine (CYMBALTA) 60 MG capsule Take 1 capsule by mouth Every Morning. 30 capsule 2   • gabapentin (NEURONTIN) 600 MG tablet Take 1 tablet by mouth 3 (Three) Times a Day.     • HYDROcodone-acetaminophen (NORCO) 7.5-325 MG per tablet TAKE 1 TAB BY MOUTH 3 TIMES A DAY AS NEEDED FOR PAIN     • levonorgestrel (MIRENA, 52 MG,) 20 MCG/24HR IUD 1 each by Intrauterine route 1 (One) Time for 1 dose. 1 each 0   • [DISCONTINUED] ARIPiprazole (ABILIFY) 5 MG tablet Take 1 tablet by mouth Daily.     • [DISCONTINUED] Insulin Pen Needle (Pen  Needles) 31G X 8 MM misc For use with Victoza pen 90 each 3   • Asenapine Maleate 2.5 MG sublingual tablet PLACE 1 TABLET UNDER THE TONGUE DAILY AS NEEDED FOR ANXIETY OR AGITATION     • Diclofenac Sodium 1.5 % solution      • lidocaine (XYLOCAINE) 5 % ointment      • mirtazapine (REMERON) 15 MG tablet Take 1 tablet by mouth every night at bedtime.     • naloxone (NARCAN) 4 MG/0.1ML nasal spray SQUIRT IN THE NOSTRIL AT SIGNS OF OVERDOSE MAY REPEAT EVERY 2-3 MINUTES UNTIL PATIENT RESPONSIVE OR EMS ARRIVES     • QUEtiapine (SEROquel) 100 MG tablet Take 1 tablet by mouth every night at bedtime.     • [DISCONTINUED] benztropine (COGENTIN) 2 MG tablet Take 1 tablet by mouth 2 (Two) Times a Day. 60 tablet 2   • [DISCONTINUED] cyanocobalamin (VITAMIN B-12) 1000 MCG tablet Take 1 tablet by mouth Daily. 30 tablet 0   • [DISCONTINUED] fluconazole (Diflucan) 150 MG tablet Daily 3 days 3 tablet 0   • [DISCONTINUED] fluconazole (DIFLUCAN) 200 MG tablet Take 1 tablet by mouth Daily. 2 tablet 0   • [DISCONTINUED] promethazine (PHENERGAN) 25 MG tablet Take 1 tablet by mouth Every 6 (Six) Hours As Needed for Nausea or Vomiting. 20 tablet 0   • [DISCONTINUED] SUMAtriptan (IMITREX) 50 MG tablet Take one tablet at onset of headache. May repeat dose one time in 2 hours if headache not relieved. 9 tablet 11   • [DISCONTINUED] Vraylar 6 MG capsule        Current Facility-Administered Medications on File Prior to Visit   Medication Dose Route Frequency Provider Last Rate Last Admin   • Semaglutide(0.25 or 0.5MG/DOS) (OZEMPIC) solution pen-injector 0.25 mg  0.25 mg Subcutaneous Weekly Aileen Braden, APRN           20 minutes   Follow Up   Return in about 6 months (around 11/1/2023) for Next scheduled follow up.        It's not just what you eat, but when you eat  Eat breakfast, and eat smaller meals throughout the day. A healthy breakfast can jumpstart your metabolism, while eating small, healthy meals (rather than the standard three  large meals) keeps your energy up.   Avoid eating at night. Try to eat dinner earlier and fast for 14-16 hours until breakfast the next morning. Studies suggest that eating only when you’re most active and giving your digestive system a long break each day may help to regulate weight.     Labs as directed -add ozempic and metformin as directed  Diet discussed as directed     See back in 6 months

## (undated) DEVICE — CVR FLUOROSCOPE C/ARM W/TP 36X28IN

## (undated) DEVICE — PK EXTRM LF 60

## (undated) DEVICE — GLV SURG TRIUMPH LT PF LTX 6 STRL

## (undated) DEVICE — BNDG ELAS ELITE V/CLOSE 6IN 5YD LF STRL

## (undated) DEVICE — DISPOSABLE TOURNIQUET CUFF SINGLE BLADDER, DUAL PORT AND QUICK CONNECT CONNECTOR: Brand: COLOR CUFF

## (undated) DEVICE — BNDG ELAS CO-FLEX SLF ADHR 4IN5YD LF STRL

## (undated) DEVICE — GLV SURG SENSICARE MICRO PF LF 7.5 STRL

## (undated) DEVICE — DRAPE,U/ SHT,SPLIT,PLAS,STERIL: Brand: MEDLINE

## (undated) DEVICE — GLV SURG SENSICARE ALOE LF PF SZ7.5 GRN

## (undated) DEVICE — SOL IRR NACL 0.9PCT BT 1000ML

## (undated) DEVICE — GOWN,AURORA,NOREINF,RAGLAN,XL,STERILE: Brand: MEDLINE

## (undated) DEVICE — BIT DRL QC DIA 3.5X110MM

## (undated) DEVICE — Device

## (undated) DEVICE — ADAPT CANCER LUER STUB 18G

## (undated) DEVICE — GLV SURG TRIUMPH ORTHO W/ALOE PF LTX 7.0

## (undated) DEVICE — SUT VICRYL 2-0  X-1 27IN ETVCP459H PLS

## (undated) DEVICE — PAD UNDERCAST WYTEX 4IN 4YD LF STRL

## (undated) DEVICE — SPLNT ORTHOGLASS P/C 34X30IN LF

## (undated) DEVICE — GW THRD 1.25X150MM FOR 3.5 4MM CANN SCRW

## (undated) DEVICE — SPNG GZ WOVN 4X4IN 12PLY 10/BX STRL

## (undated) DEVICE — GLV SURG SENSICARE GREEN W/ALOE PF LF 6.5 STRL

## (undated) DEVICE — GAUZE,SPONGE,FLUFF,6"X6.75",STRL,5/TRAY: Brand: MEDLINE

## (undated) DEVICE — GLV SURG SENSICARE GREEN W/ALOE PF LF 8 STRL

## (undated) DEVICE — SPNG LAP 18X18IN LF STRL PK/5

## (undated) DEVICE — CVR SURG EQUIP BND RECTG 36X28

## (undated) DEVICE — SHEET,DRAPE,53X77,STERILE: Brand: MEDLINE

## (undated) DEVICE — GLV SURG TRIUMPH LT PF LTX 6.5 STRL

## (undated) DEVICE — APPL CHLORAPREP W/TINT 26ML ORNG

## (undated) DEVICE — BNDG ELAS ELITE V/CLOSE 4IN 5YD LF STRL

## (undated) DEVICE — BNDG PLSTR SPECIALIST FAST 4IN 5YD LF

## (undated) DEVICE — SUT NLY 2/0 664G

## (undated) DEVICE — SYR LUERLOK 20CC

## (undated) DEVICE — DRSNG GZ CURAD XEROFORM NONADHS 5X9IN STRL

## (undated) DEVICE — GLV SURG TRIUMPH LT PF LTX 9 STRL

## (undated) DEVICE — PAD,ABDOMINAL,8"X10",ST,LF: Brand: MEDLINE

## (undated) DEVICE — BIT DRL QC DIA 2.5X110MM

## (undated) DEVICE — PAD UNDERCAST WYTEX 6IN 4YD LF STRL

## (undated) DEVICE — SYR LL TP 10ML STRL

## (undated) DEVICE — UNDRPD BREATH 23X36 BG/10